# Patient Record
Sex: FEMALE | Race: WHITE | Employment: OTHER | RURAL
[De-identification: names, ages, dates, MRNs, and addresses within clinical notes are randomized per-mention and may not be internally consistent; named-entity substitution may affect disease eponyms.]

---

## 2017-01-11 ENCOUNTER — OFFICE VISIT (OUTPATIENT)
Dept: FAMILY MEDICINE CLINIC | Age: 77
End: 2017-01-11

## 2017-01-11 VITALS
RESPIRATION RATE: 18 BRPM | HEART RATE: 76 BPM | DIASTOLIC BLOOD PRESSURE: 70 MMHG | WEIGHT: 183.2 LBS | OXYGEN SATURATION: 98 % | BODY MASS INDEX: 35.97 KG/M2 | SYSTOLIC BLOOD PRESSURE: 132 MMHG | TEMPERATURE: 97.2 F | HEIGHT: 60 IN

## 2017-01-11 DIAGNOSIS — I10 ESSENTIAL HYPERTENSION: ICD-10-CM

## 2017-01-11 DIAGNOSIS — E78.00 PURE HYPERCHOLESTEROLEMIA: ICD-10-CM

## 2017-01-11 DIAGNOSIS — E11.21 TYPE 2 DIABETES MELLITUS WITH DIABETIC NEPHROPATHY, WITHOUT LONG-TERM CURRENT USE OF INSULIN (HCC): Primary | ICD-10-CM

## 2017-01-11 NOTE — MR AVS SNAPSHOT
Visit Information Date & Time Provider Department Dept. Phone Encounter #  
 1/11/2017  9:30 AM Halle Barrow MD [address] [place] [address] [id number] Follow-up Instructions Return in about 3 months (around 4/11/2017). Follow-up and Disposition History Upcoming Health Maintenance Date Due OSTEOPOROSIS SCREENING (DEXA) 2/16/2005 DTaP/Tdap/Td series (1 - Tdap) 4/12/2009 Pneumococcal 65+ Low/Medium Risk (2 of 2 - PPSV23) 10/8/2016 MEDICARE YEARLY EXAM 1/11/2017 HEMOGLOBIN A1C Q6M 4/12/2017 EYE EXAM RETINAL OR DILATED Q1 6/21/2017 MICROALBUMIN Q1 7/11/2017 FOOT EXAM Q1 10/12/2017 LIPID PANEL Q1 10/12/2017 GLAUCOMA SCREENING Q2Y 6/21/2018 Allergies as of 1/11/2017  Review Complete On: 1/11/2017 By: Halle Barrow MD  
  
 Severity Noted Reaction Type Reactions Ace Inhibitors  10/18/2013    Unable to Obtain Antihist [Diphenhydramine Hcl]  10/18/2013    Hives Zithromax [Azithromycin]  10/18/2013    Itching Current Immunizations  Reviewed on 10/12/2016 Name Date Influenza High Dose Vaccine PF 10/12/2016 Influenza Vaccine 10/8/2015, 10/18/2013 Pneumococcal Conjugate (PCV-13) 10/8/2015 Pneumococcal Polysaccharide (PPSV-23) 1/12/2001 Td 4/11/2009 Zoster Vaccine, Live 7/21/2014 Not reviewed this visit You Were Diagnosed With   
  
 Codes Comments Type 2 diabetes mellitus with diabetic nephropathy, without long-term current use of insulin (HCC)    -  Primary ICD-10-CM: E11.21 
ICD-9-CM: 250.40, 583.81 Pure hypercholesterolemia     ICD-10-CM: E78.00 ICD-9-CM: 272.0 Essential hypertension     ICD-10-CM: I10 
ICD-9-CM: 401.9 Vitals BP Pulse Temp Resp Height(growth percentile) 132/70 (BP 1 Location: Left arm, BP Patient Position: Sitting) 76 97.2 °F (36.2 °C) (Temporal) 18 5' (1.524 m) Weight(growth percentile) SpO2 BMI OB Status Smoking Status 183 lb 3.2 oz (83.1 kg) 98% 35.78 kg/m2 Menopause Never Smoker BMI and BSA Data Body Mass Index Body Surface Area 35.78 kg/m 2 1.88 m 2 Preferred Pharmacy Pharmacy Name [address] [phone] Your Updated Medication List  
  
   
This list is accurate as of: 1/11/17 10:38 AM.  Always use your most recent med list.  
  
  
  
  
 atorvastatin 20 mg tablet Commonly known as:  LIPITOR Take 1 Tab by mouth daily. Indications: prevent cardiac disease  
  
 clotrimazole 1 % topical cream  
Commonly known as:  Nguyen Falk Apply  to affected area two (2) times a day. exenatide microspheres 2 mg/0.65 mL Pnij Commonly known as:  BYDUREON  
2 mg by SubCUTAneous route every seven (7) days. FISH OIL 1,000 mg Cap Generic drug:  omega-3 fatty acids-vitamin e Take 1 Cap by mouth.  
  
 losartan 100 mg tablet Commonly known as:  COZAAR Take 1 Tab by mouth daily. metFORMIN  mg tablet Commonly known as:  GLUCOPHAGE XR Take 4 Tabs by mouth daily (with dinner). We Performed the Following COLLECTION VENOUS BLOOD,VENIPUNCTURE A6763907 CPT(R)] HEMOGLOBIN A1C WITH EAG [68084 CPT(R)] METABOLIC PANEL, BASIC [15026 CPT(R)] MICROALBUMIN, UR, RAND W/ MICROALBUMIN/CREA RATIO [id number] CPT(R)] Follow-up Instructions Return in about 3 months (around 4/11/2017). Introducing [facility]! Dear Camilla Heard: Thank you for requesting a [email or web address] account. Our records indicate that you already have an active [email or web address] account. You can access your account anytime at [email or web address] Did you know that you can access your hospital and ER discharge instructions at any time in [email or web address]? You can also review all of your test results from your hospital stay or ER visit. Additional Information If you have questions, please visit the Frequently Asked Questions section of the [employer] website at [email or web address] Remember, [employer] is NOT to be used for urgent needs. For medical emergencies, dial 911. Now available from your iPhone and Android! Please provide this summary of care documentation to your next provider. Your primary care clinician is listed as Carol Ann Esteban. If you have any questions after today's visit, please call [phone].

## 2017-01-11 NOTE — PROGRESS NOTES
Sara Silva is a 68 y.o. female presenting for/with:    Check Up    HPI:  Diabetes. Sugars controlled fairly on last check on metformin and bydureon 1 inj/wk. Improved a little, but weight really not dropping. Hypoglycemia: none  Tolerating current treatment well  Current medications include metformin ER 2000mg/d and bydureon weekly. Lab Results   Component Value Date/Time    Hemoglobin A1c 9.6 10/12/2016 10:59 AM    Hemoglobin A1c 10.0 07/11/2016 09:28 AM    Hemoglobin A1c 9.7 01/11/2016 10:44 AM    Glucose 226 07/11/2016 09:28 AM    Microalb/Creat ratio (ug/mg creat.) 64.7 07/11/2016 09:27 AM    LDL, calculated 49 10/12/2016 10:59 AM    Creatinine 0.69 07/11/2016 09:28 AM     Lab Results   Component Value Date/Time    Microalb/Creat ratio (ug/mg creat.) 64.7 07/11/2016 09:27 AM     Last eye exam performed  6/16 with DR. Denise Amaya in [facility], no DR. Last foot exam performed 10/16, no trophic changes or ulcerative lesions and normal monofilament exam    Hypertension. Blood pressures have been improving. Management at last visit included con't current tx. Current regimen: angiotensin II receptor antagonist. Symptoms include no symptoms. Patient denies chest pain, palpitations, peripheral edema. Lab review:   Lab Results   Component Value Date/Time    Sodium 138 07/11/2016 09:28 AM    Potassium 4.5 07/11/2016 09:28 AM    Chloride 100 07/11/2016 09:28 AM    CO2 26 07/11/2016 09:28 AM    Anion gap 13 09/27/2010 05:30 AM    Glucose 226 07/11/2016 09:28 AM    BUN 13 07/11/2016 09:28 AM    Creatinine 0.69 07/11/2016 09:28 AM    BUN/Creatinine ratio 19 07/11/2016 09:28 AM    GFR est AA 98 07/11/2016 09:28 AM    GFR est non-AA 85 07/11/2016 09:28 AM    Calcium 9.4 07/11/2016 09:28 AM     Hyperlipidemia. On lipitor 20. Sommer well. No myalgias, arthralgias, unusual weakness.   Lab Results   Component Value Date/Time    Cholesterol, total 145 10/12/2016 10:59 AM    HDL Cholesterol 56 10/12/2016 10:59 AM    LDL, calculated 49 10/12/2016 10:59 AM    VLDL, calculated 40 10/12/2016 10:59 AM    Triglyceride 202 10/12/2016 10:59 AM     Lab Results   Component Value Date/Time    ALT 23 10/12/2015 09:53 AM    AST 9 10/12/2015 09:53 AM    Alk. phosphatase 96 10/12/2015 09:53 AM    Bilirubin, total 0.3 10/12/2015 09:53 AM     Visit Vitals    /70 (BP 1 Location: Left arm, BP Patient Position: Sitting)    Pulse 76    Temp 97.2 °F (36.2 °C) (Temporal)    Resp 18    Ht 5' (1.524 m)    Wt 183 lb 3.2 oz (83.1 kg)    SpO2 98%    BMI 35.78 kg/m2     Wt Readings from Last 3 Encounters:   01/11/17 183 lb 3.2 oz (83.1 kg)   10/12/16 183 lb (83 kg)   08/29/16 184 lb (83.5 kg)     BP Readings from Last 3 Encounters:   01/11/17 132/70   10/12/16 148/71   08/29/16 130/58     Physical Examination: General appearance - alert, well appearing, and in no distress  Mental status - alert, oriented to person, place, and time  Eyes - pupils equal and reactive, extraocular eye movements intact. ENT - bilateral external ears and nose normal. Normal lips. Bilat ears with mod cerumen, L >R. Neck - supple, no significant adenopathy, no thyromegaly or mass  Extremities - peripheral pulses normal, no pedal edema, no clubbing or cyanosis. A/P:  DM2  Doing well on bydureon 1 inj/week and metformin, yulissa well, but weight not dropping. Check a1c, monster/cr. If A1c not improving, plan start amaryl 1mg every day. HTN  Ok today. Con't current tx. HLD  well controlled. con't current tx.     F/U 3mo/PRN

## 2017-01-12 LAB
ALBUMIN/CREAT UR: 24.7 MG/G CREAT (ref 0–30)
BUN SERPL-MCNC: 16 MG/DL (ref 8–27)
BUN/CREAT SERPL: 28 (ref 11–26)
CALCIUM SERPL-MCNC: 9.5 MG/DL (ref 8.7–10.3)
CHLORIDE SERPL-SCNC: 101 MMOL/L (ref 96–106)
CO2 SERPL-SCNC: 22 MMOL/L (ref 18–29)
CREAT SERPL-MCNC: 0.58 MG/DL (ref 0.57–1)
CREAT UR-MCNC: 88.5 MG/DL
EST. AVERAGE GLUCOSE BLD GHB EST-MCNC: 183 MG/DL
GLUCOSE SERPL-MCNC: 149 MG/DL (ref 65–99)
HBA1C MFR BLD: 8 % (ref 4.8–5.6)
MICROALBUMIN UR-MCNC: 21.9 UG/ML
POTASSIUM SERPL-SCNC: 4.6 MMOL/L (ref 3.5–5.2)
SODIUM SERPL-SCNC: 142 MMOL/L (ref 134–144)

## 2017-02-14 ENCOUNTER — HOSPITAL ENCOUNTER (OUTPATIENT)
Dept: PREADMISSION TESTING | Age: 77
Discharge: HOME | End: 2017-02-14
Attending: PLASTIC SURGERY
Payer: MEDICARE

## 2017-02-14 VITALS
DIASTOLIC BLOOD PRESSURE: 70 MMHG | OXYGEN SATURATION: 95 % | HEIGHT: 61 IN | TEMPERATURE: 97.6 F | HEART RATE: 82 BPM | WEIGHT: 184.3 LBS | SYSTOLIC BLOOD PRESSURE: 131 MMHG | RESPIRATION RATE: 20 BRPM | BODY MASS INDEX: 34.8 KG/M2

## 2017-02-14 LAB
ANION GAP BLD CALC-SCNC: 10 MMOL/L (ref 5–15)
ATRIAL RATE: 77 BPM
BUN SERPL-MCNC: 17 MG/DL (ref 6–20)
BUN/CREAT SERPL: 22 (ref 12–20)
CALCIUM SERPL-MCNC: 9.2 MG/DL (ref 8.5–10.1)
CALCULATED P AXIS, ECG09: 64 DEGREES
CALCULATED R AXIS, ECG10: 37 DEGREES
CALCULATED T AXIS, ECG11: 31 DEGREES
CHLORIDE SERPL-SCNC: 107 MMOL/L (ref 97–108)
CO2 SERPL-SCNC: 26 MMOL/L (ref 21–32)
CREAT SERPL-MCNC: 0.77 MG/DL (ref 0.55–1.02)
DIAGNOSIS, 93000: NORMAL
ERYTHROCYTE [DISTWIDTH] IN BLOOD BY AUTOMATED COUNT: 14 % (ref 11.5–14.5)
GLUCOSE SERPL-MCNC: 200 MG/DL (ref 65–100)
HCT VFR BLD AUTO: 34 % (ref 35–47)
HGB BLD-MCNC: 11 G/DL (ref 11.5–16)
MCH RBC QN AUTO: 28.9 PG (ref 26–34)
MCHC RBC AUTO-ENTMCNC: 32.4 G/DL (ref 30–36.5)
MCV RBC AUTO: 89.2 FL (ref 80–99)
P-R INTERVAL, ECG05: 148 MS
PLATELET # BLD AUTO: 193 K/UL (ref 150–400)
POTASSIUM SERPL-SCNC: 4.2 MMOL/L (ref 3.5–5.1)
Q-T INTERVAL, ECG07: 368 MS
QRS DURATION, ECG06: 86 MS
QTC CALCULATION (BEZET), ECG08: 416 MS
RBC # BLD AUTO: 3.81 M/UL (ref 3.8–5.2)
SODIUM SERPL-SCNC: 143 MMOL/L (ref 136–145)
VENTRICULAR RATE, ECG03: 77 BPM
WBC # BLD AUTO: 6.2 K/UL (ref 3.6–11)

## 2017-02-14 PROCEDURE — 80048 BASIC METABOLIC PNL TOTAL CA: CPT | Performed by: PLASTIC SURGERY

## 2017-02-14 PROCEDURE — 36415 COLL VENOUS BLD VENIPUNCTURE: CPT | Performed by: PLASTIC SURGERY

## 2017-02-14 PROCEDURE — 85027 COMPLETE CBC AUTOMATED: CPT | Performed by: PLASTIC SURGERY

## 2017-02-14 PROCEDURE — 93005 ELECTROCARDIOGRAM TRACING: CPT

## 2017-02-14 RX ORDER — METFORMIN HYDROCHLORIDE EXTENDED-RELEASE TABLETS 1000 MG/1
1000 TABLET, FILM COATED, EXTENDED RELEASE ORAL 2 TIMES DAILY
COMMUNITY
End: 2017-03-13 | Stop reason: SDUPTHER

## 2017-02-14 RX ORDER — SODIUM CHLORIDE, SODIUM LACTATE, POTASSIUM CHLORIDE, CALCIUM CHLORIDE 600; 310; 30; 20 MG/100ML; MG/100ML; MG/100ML; MG/100ML
25 INJECTION, SOLUTION INTRAVENOUS CONTINUOUS
Status: CANCELLED | OUTPATIENT
Start: 2017-02-22

## 2017-02-14 NOTE — PERIOP NOTES
Spoke to Dr. 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 Order received.

## 2017-02-14 NOTE — PERIOP NOTES
[facility]  Preoperative Instructions        Surgery Date 02/22/16          Time of Arrival 1030  Contact # [phone] home    1. On the day of your surgery, please report to the Surgical Services Registration Desk and sign in at your designated time. The Surgery Center is located to the right of the Emergency Room. 2. You must have someone with you to drive you home. You should not drive a car for 24 hours following surgery. Please make arrangements for a friend or family member to stay with you for the first 24 hours after your surgery. 3. Do not have anything to eat or drink (including water, gum, mints, coffee, juice) after midnight       . This may not apply to medications prescribed by your physician. Please note special instructions, if applicable. If you are currently taking Plavix, Coumadin, or other blood-thinning agents, contact your surgeon for instructions. 4. We recommend you do not drink any alcoholic beverages for 24 hours before and after your surgery. 5. Have a list of all current medications, including vitamins, herbal supplements and any other over the counter medications. Stop all Aspirin and non-steroidal anti-inflammatory drugs (I.e. Advil, Aleve), as directed by your surgeon's office. Stop all vitamins and herbal supplements seven days prior to your surgery. 6. Wear comfortable clothes. Wear glasses instead of contacts. Do not bring any money or jewelry. Please bring picture ID, insurance card, and any prearranged co-payment or hospital payment. Do not wear make-up, particularly mascara the morning of your surgery. Do not wear nail polish, particularly if you are having foot /hand surgery. Wear your hair loose or down, no ponytails, buns, adonis pins or clips. All body piercings must be removed.   Please shower with antibacterial soap for three consecutive days before and on the morning of surgery, but do not apply any lotions, powders or deodorants after the shower on the day of surgery. Please use a fresh towels after each shower. Please sleep in clean clothes and change bed linens the night before surgery. Please do not shave for 48 hours prior to surgery. Shaving of the face is acceptable. 7. You should understand that if you do not follow these instructions your surgery may be cancelled. If your physical condition changes (I.e. fever, cold or flu) please contact your surgeon as soon as possible. 8. It is important that you be on time. If a situation occurs where you may be late, please call [phone] (OR Holding Area). 9. If you have any questions and or problems, please call [phone] (Pre-admission Testing). 10. Your surgery time may be subject to change. You will receive a phone call the evening prior if your time changes. 11.  If having outpatient surgery, you must have someone to drive you here, stay with you during the duration of your stay, and to drive you home at time of discharge. Special Instructions:    MEDICATIONS TO TAKE THE MORNING OF SURGERY WITH A SIP OF WATER:none      I understand a pre-operative phone call will be made to verify my surgery time. In the event that I am not available, I give permission for a message to be left on my answering service and/or with another person?   yes         ___________________      __________   _________    (Signature of Patient)             (Witness)                (Date and Time)

## 2017-02-15 LAB
BACTERIA SPEC CULT: NORMAL
BACTERIA SPEC CULT: NORMAL
SERVICE CMNT-IMP: NORMAL

## 2017-02-22 ENCOUNTER — ANESTHESIA EVENT (OUTPATIENT)
Dept: SURGERY | Age: 77
End: 2017-02-22
Payer: MEDICARE

## 2017-02-22 ENCOUNTER — HOSPITAL ENCOUNTER (OUTPATIENT)
Age: 77
Setting detail: OUTPATIENT SURGERY
Discharge: HOME | End: 2017-02-22
Attending: PLASTIC SURGERY | Admitting: PLASTIC SURGERY
Payer: MEDICARE

## 2017-02-22 ENCOUNTER — ANESTHESIA (OUTPATIENT)
Dept: SURGERY | Age: 77
End: 2017-02-22
Payer: MEDICARE

## 2017-02-22 VITALS
SYSTOLIC BLOOD PRESSURE: 148 MMHG | HEIGHT: 61 IN | BODY MASS INDEX: 34.8 KG/M2 | HEART RATE: 72 BPM | OXYGEN SATURATION: 97 % | RESPIRATION RATE: 16 BRPM | DIASTOLIC BLOOD PRESSURE: 55 MMHG | TEMPERATURE: 98.2 F | WEIGHT: 184.3 LBS

## 2017-02-22 LAB
GLUCOSE BLD STRIP.AUTO-MCNC: 127 MG/DL (ref 65–100)
GLUCOSE BLD STRIP.AUTO-MCNC: 128 MG/DL (ref 65–100)
SERVICE CMNT-IMP: ABNORMAL
SERVICE CMNT-IMP: ABNORMAL

## 2017-02-22 PROCEDURE — 74011000250 HC RX REV CODE- 250: Performed by: PLASTIC SURGERY

## 2017-02-22 PROCEDURE — 77030002996 HC SUT SLK J&J -A: Performed by: PLASTIC SURGERY

## 2017-02-22 PROCEDURE — 77030032490 HC SLV COMPR SCD KNE COVD -B: Performed by: PLASTIC SURGERY

## 2017-02-22 PROCEDURE — 76210000063 HC OR PH I REC FIRST 0.5 HR: Performed by: PLASTIC SURGERY

## 2017-02-22 PROCEDURE — 76010000138 HC OR TIME 0.5 TO 1 HR: Performed by: PLASTIC SURGERY

## 2017-02-22 PROCEDURE — 77030018846 HC SOL IRR STRL H20 ICUM -A: Performed by: PLASTIC SURGERY

## 2017-02-22 PROCEDURE — 74011250636 HC RX REV CODE- 250/636: Performed by: ANESTHESIOLOGY

## 2017-02-22 PROCEDURE — 74011250636 HC RX REV CODE- 250/636

## 2017-02-22 PROCEDURE — 82962 GLUCOSE BLOOD TEST: CPT

## 2017-02-22 PROCEDURE — 76060000032 HC ANESTHESIA 0.5 TO 1 HR: Performed by: PLASTIC SURGERY

## 2017-02-22 PROCEDURE — 74011250636 HC RX REV CODE- 250/636: Performed by: PLASTIC SURGERY

## 2017-02-22 PROCEDURE — 77030002888 HC SUT CHRMC J&J -A: Performed by: PLASTIC SURGERY

## 2017-02-22 PROCEDURE — 88305 TISSUE EXAM BY PATHOLOGIST: CPT | Performed by: PLASTIC SURGERY

## 2017-02-22 PROCEDURE — 76210000020 HC REC RM PH II FIRST 0.5 HR: Performed by: PLASTIC SURGERY

## 2017-02-22 PROCEDURE — 77030018836 HC SOL IRR NACL ICUM -A: Performed by: PLASTIC SURGERY

## 2017-02-22 PROCEDURE — 74011000250 HC RX REV CODE- 250

## 2017-02-22 RX ORDER — SODIUM CHLORIDE, SODIUM LACTATE, POTASSIUM CHLORIDE, CALCIUM CHLORIDE 600; 310; 30; 20 MG/100ML; MG/100ML; MG/100ML; MG/100ML
25 INJECTION, SOLUTION INTRAVENOUS CONTINUOUS
Status: DISCONTINUED | OUTPATIENT
Start: 2017-02-22 | End: 2017-02-22 | Stop reason: HOSPADM

## 2017-02-22 RX ORDER — DIPHENHYDRAMINE HYDROCHLORIDE 50 MG/ML
12.5 INJECTION, SOLUTION INTRAMUSCULAR; INTRAVENOUS
Status: DISCONTINUED | OUTPATIENT
Start: 2017-02-22 | End: 2017-02-22 | Stop reason: HOSPADM

## 2017-02-22 RX ORDER — MIDAZOLAM HYDROCHLORIDE 1 MG/ML
INJECTION, SOLUTION INTRAMUSCULAR; INTRAVENOUS AS NEEDED
Status: DISCONTINUED | OUTPATIENT
Start: 2017-02-22 | End: 2017-02-22 | Stop reason: HOSPADM

## 2017-02-22 RX ORDER — PROPOFOL 10 MG/ML
INJECTION, EMULSION INTRAVENOUS
Status: DISCONTINUED | OUTPATIENT
Start: 2017-02-22 | End: 2017-02-22 | Stop reason: HOSPADM

## 2017-02-22 RX ORDER — LIDOCAINE HYDROCHLORIDE 10 MG/ML
0.1 INJECTION, SOLUTION EPIDURAL; INFILTRATION; INTRACAUDAL; PERINEURAL AS NEEDED
Status: DISCONTINUED | OUTPATIENT
Start: 2017-02-22 | End: 2017-02-22 | Stop reason: HOSPADM

## 2017-02-22 RX ORDER — MORPHINE SULFATE 10 MG/ML
2 INJECTION, SOLUTION INTRAMUSCULAR; INTRAVENOUS
Status: DISCONTINUED | OUTPATIENT
Start: 2017-02-22 | End: 2017-02-22 | Stop reason: HOSPADM

## 2017-02-22 RX ORDER — FENTANYL CITRATE 50 UG/ML
INJECTION, SOLUTION INTRAMUSCULAR; INTRAVENOUS AS NEEDED
Status: DISCONTINUED | OUTPATIENT
Start: 2017-02-22 | End: 2017-02-22 | Stop reason: HOSPADM

## 2017-02-22 RX ORDER — CEFAZOLIN SODIUM IN 0.9 % NACL 2 G/100 ML
2 PLASTIC BAG, INJECTION (ML) INTRAVENOUS EVERY 8 HOURS
Status: COMPLETED | OUTPATIENT
Start: 2017-02-22 | End: 2017-02-22

## 2017-02-22 RX ORDER — SODIUM CHLORIDE 0.9 % (FLUSH) 0.9 %
5-10 SYRINGE (ML) INJECTION AS NEEDED
Status: DISCONTINUED | OUTPATIENT
Start: 2017-02-22 | End: 2017-02-22 | Stop reason: HOSPADM

## 2017-02-22 RX ORDER — HYDROCODONE BITARTRATE AND ACETAMINOPHEN 5; 325 MG/1; MG/1
1-2 TABLET ORAL
Qty: 20 TAB | Refills: 0 | Status: SHIPPED | OUTPATIENT
Start: 2017-02-22 | End: 2017-11-01 | Stop reason: ALTCHOICE

## 2017-02-22 RX ORDER — SODIUM CHLORIDE 0.9 % (FLUSH) 0.9 %
5-10 SYRINGE (ML) INJECTION EVERY 8 HOURS
Status: DISCONTINUED | OUTPATIENT
Start: 2017-02-22 | End: 2017-02-22 | Stop reason: HOSPADM

## 2017-02-22 RX ORDER — HYDROMORPHONE HYDROCHLORIDE 1 MG/ML
.2-.5 INJECTION, SOLUTION INTRAMUSCULAR; INTRAVENOUS; SUBCUTANEOUS
Status: DISCONTINUED | OUTPATIENT
Start: 2017-02-22 | End: 2017-02-22 | Stop reason: HOSPADM

## 2017-02-22 RX ORDER — FENTANYL CITRATE 50 UG/ML
25 INJECTION, SOLUTION INTRAMUSCULAR; INTRAVENOUS
Status: DISCONTINUED | OUTPATIENT
Start: 2017-02-22 | End: 2017-02-22 | Stop reason: HOSPADM

## 2017-02-22 RX ORDER — LIDOCAINE HYDROCHLORIDE AND EPINEPHRINE 10; 10 MG/ML; UG/ML
INJECTION, SOLUTION INFILTRATION; PERINEURAL AS NEEDED
Status: DISCONTINUED | OUTPATIENT
Start: 2017-02-22 | End: 2017-02-22 | Stop reason: HOSPADM

## 2017-02-22 RX ORDER — ONDANSETRON 2 MG/ML
INJECTION INTRAMUSCULAR; INTRAVENOUS AS NEEDED
Status: DISCONTINUED | OUTPATIENT
Start: 2017-02-22 | End: 2017-02-22 | Stop reason: HOSPADM

## 2017-02-22 RX ORDER — PROPOFOL 10 MG/ML
INJECTION, EMULSION INTRAVENOUS AS NEEDED
Status: DISCONTINUED | OUTPATIENT
Start: 2017-02-22 | End: 2017-02-22 | Stop reason: HOSPADM

## 2017-02-22 RX ADMIN — ONDANSETRON 4 MG: 2 INJECTION INTRAMUSCULAR; INTRAVENOUS at 12:45

## 2017-02-22 RX ADMIN — SODIUM CHLORIDE, SODIUM LACTATE, POTASSIUM CHLORIDE, AND CALCIUM CHLORIDE 25 ML/HR: 600; 310; 30; 20 INJECTION, SOLUTION INTRAVENOUS at 11:39

## 2017-02-22 RX ADMIN — FENTANYL CITRATE 50 MCG: 50 INJECTION, SOLUTION INTRAMUSCULAR; INTRAVENOUS at 12:36

## 2017-02-22 RX ADMIN — SODIUM CHLORIDE, SODIUM LACTATE, POTASSIUM CHLORIDE, AND CALCIUM CHLORIDE: 600; 310; 30; 20 INJECTION, SOLUTION INTRAVENOUS at 12:10

## 2017-02-22 RX ADMIN — FENTANYL CITRATE 50 MCG: 50 INJECTION, SOLUTION INTRAMUSCULAR; INTRAVENOUS at 12:30

## 2017-02-22 RX ADMIN — PROPOFOL 20 MG: 10 INJECTION, EMULSION INTRAVENOUS at 12:38

## 2017-02-22 RX ADMIN — PROPOFOL 10 MG: 10 INJECTION, EMULSION INTRAVENOUS at 12:42

## 2017-02-22 RX ADMIN — MIDAZOLAM HYDROCHLORIDE 2 MG: 1 INJECTION, SOLUTION INTRAMUSCULAR; INTRAVENOUS at 12:30

## 2017-02-22 RX ADMIN — PROPOFOL 10 MG: 10 INJECTION, EMULSION INTRAVENOUS at 12:46

## 2017-02-22 RX ADMIN — PROPOFOL 25 MCG/KG/MIN: 10 INJECTION, EMULSION INTRAVENOUS at 12:45

## 2017-02-22 RX ADMIN — CEFAZOLIN 2 G: 10 INJECTION, POWDER, FOR SOLUTION INTRAVENOUS; PARENTERAL at 12:43

## 2017-02-22 NOTE — IP AVS SNAPSHOT
[address] [facility] 
[phone] Patient: Manisha Paredes MRN: [id number] You are allergic to the following Allergen Reactions Ace Inhibitors Unable to Obtain Antihist (Diphenhydramine Hcl) Hives Zithromax (Azithromycin) Itching Recent Documentation Height  
  
  
  
  
  
 1.549 m Emergency Contacts Name Discharge Info Relation Home Work Mobile Perico Angelo CAREGIVER [3] Spouse [3]   [phone] About your hospitalization You were admitted on:  February 22, 2017 You last received care in the:  [facility] PACU You were discharged on:  February 22, 2017 Unit phone number:  [phone] Why you were hospitalized Your primary diagnosis was:  Not on File Providers Seen During Your Hospitalizations Provider Role Specialty Primary office phone Marleny Dumont MD Attending Provider Plastic Surgery [phone] Your Primary Care Physician (PCP) Primary Care Physician Office Phone Office Fax [place], [address] [phone] Follow-up Information Follow up With Details Comments Contact Info Mikayla Pal MD   [address] [facility] [facility] [phone] Current Discharge Medication List  
  
START taking these medications Dose & Instructions Dispensing Information Comments Morning Noon Evening Bedtime HYDROcodone-acetaminophen 5-325 mg per tablet Commonly known as:  Birgit Sharp Your next dose is: Today, Tomorrow Other:  _________ Dose:  1-2 Tab Take 1-2 Tabs by mouth every four (4) hours as needed for Pain. Max Daily Amount: 12 Tabs. Quantity:  20 Tab Refills:  0 CONTINUE these medications which have NOT CHANGED Dose & Instructions Dispensing Information Comments Morning Noon Evening Bedtime AMOXICILLIN PO Your next dose is: Today, Tomorrow Other:  _________ Take  by mouth. Takes 4 tabs prior to dental work Refills:  0  
     
   
   
   
  
 atorvastatin 20 mg tablet Commonly known as:  LIPITOR Your next dose is: Today, Tomorrow Other:  _________ Dose:  20 mg Take 1 Tab by mouth daily. Indications: prevent cardiac disease Quantity:  90 Tab Refills:  3  
     
   
   
   
  
 clotrimazole 1 % topical cream  
Commonly known as:  Laurel Lash Your next dose is: Today, Tomorrow Other:  _________ Apply  to affected area two (2) times a day. Quantity:  45 g Refills:  3  
     
   
   
   
  
 exenatide microspheres 2 mg/0.65 mL Pnij Commonly known as:  BYDUREON Your next dose is: Today, Tomorrow Other:  _________ Dose:  2 mg  
2 mg by SubCUTAneous route every seven (7) days. Quantity:  7.8 mL Refills:  3 FISH OIL 1,000 mg Cap Generic drug:  omega-3 fatty acids-vitamin e Your next dose is: Today, Tomorrow Other:  _________ Dose:  1 Cap Take 1 Cap by mouth. Refills:  0  
     
   
   
   
  
 losartan 100 mg tablet Commonly known as:  COZAAR Your next dose is: Today, Tomorrow Other:  _________ Dose:  100 mg Take 1 Tab by mouth daily. Quantity:  90 Tab Refills:  3  
     
   
   
   
  
 metFORMIN ER 1,000 mg Tr24 Your next dose is: Today, Tomorrow Other:  _________ Dose:  1000 mg Take 1,000 mg by mouth two (2) times a day. Refills:  0 Where to Get Your Medications Information on where to get these meds will be given to you by the nurse or doctor. ! Ask your nurse or doctor about these medications HYDROcodone-acetaminophen 5-325 mg per tablet Discharge Instructions May shower Friday and wash hair. Keep incisions clean. FOLLOW UP VISIT Appointment in: Two Weeks Call [phone] to make appt DISCHARGE SUMMARY from Nurse The following personal items are in your possession at time of discharge: 
 
Dental Appliances: None Visual Aid: Glasses (in with belongings to pacu) Home Medications: None Jewelry: None Clothing: Other (comment) (street clothes taken to pacu with glasses) Other Valuables: None Personal Items Sent to Safe: declines PATIENT INSTRUCTIONS: 
 
After general anesthesia or intravenous sedation, for 24 hours or while taking prescription Narcotics: · Limit your activities · Do not drive and operate hazardous machinery · Do not make important personal or business decisions · Do  not drink alcoholic beverages · If you have not urinated within 8 hours after discharge, please contact your surgeon on call. Report the following to your surgeon: 
· Excessive pain, swelling, redness or odor of or around the surgical area · Temperature over 100.5 · Nausea and vomiting lasting longer than 4 hours or if unable to take medications · Any signs of decreased circulation or nerve impairment to extremity: change in color, persistent  numbness, tingling, coldness or increase pain · Any questions What to do at Home: *  Please give a list of your current medications to your Primary Care Provider. *  Please update this list whenever your medications are discontinued, doses are 
    changed, or new medications (including over-the-counter products) are added. *  Please carry medication information at all times in case of emergency situations. These are general instructions for a healthy lifestyle: No smoking/ No tobacco products/ Avoid exposure to second hand smoke Surgeon General's Warning:  Quitting smoking now greatly reduces serious risk to your health. Obesity, smoking, and sedentary lifestyle greatly increases your risk for illness A healthy diet, regular physical exercise & weight monitoring are important for maintaining a healthy lifestyle You may be retaining fluid if you have a history of heart failure or if you experience any of the following symptoms:  Weight gain of 3 pounds or more overnight or 5 pounds in a week, increased swelling in our hands or feet or shortness of breath while lying flat in bed. Please call your doctor as soon as you notice any of these symptoms; do not wait until your next office visit. Recognize signs and symptoms of STROKE: 
 
F-face looks uneven A-arms unable to move or move unevenly S-speech slurred or non-existent T-time-call 911 as soon as signs and symptoms begin-DO NOT go Back to bed or wait to see if you get better-TIME IS BRAIN. Warning Signs of HEART ATTACK Call 911 if you have these symptoms: 
? Chest discomfort. Most heart attacks involve discomfort in the center of the chest that lasts more than a few minutes, or that goes away and comes back. It can feel like uncomfortable pressure, squeezing, fullness, or pain. ? Discomfort in other areas of the upper body. Symptoms can include pain or discomfort in one or both arms, the back, neck, jaw, or stomach. ? Shortness of breath with or without chest discomfort. ? Other signs may include breaking out in a cold sweat, nausea, or lightheadedness. Don't wait more than five minutes to call [address]! Fast action can save your life. Calling 911 is almost always the fastest way to get lifesaving treatment. Emergency Medical Services staff can begin treatment when they arrive  up to an hour sooner than if someone gets to the hospital by car. The discharge information has been reviewed with the patient and caregiver. The patient and caregiver verbalized understanding. Discharge medications reviewed with the patient and caregiver and appropriate educational materials and side effects teaching were provided. Hydrocodone/Acetaminophen (By mouth) Acetaminophen (l-meir-p-MIN-oh-fen), Hydrocodone Bitartrate (tvn-ocmp-QAQ-done bye-TAR-trate) Treats pain. This medicine contains a narcotic pain reliever. Brand Name(s):Hycet, Lorcet, Lorcet HD, Lorcet Plus, Lortab 10/325, Lortab 5/325, Lortab 7.5/325, Lortab Elixir, Norco, Verdrocet, Vicodin, Vicodin ES, Vicodin HP, Gris Brink 5/300 There may be other brand names for this medicine. When This Medicine Should Not Be Used: This medicine is not right for everyone. Do not use it if you had an allergic reaction to acetaminophen, hydrocodone, or other narcotic medicines. How to Use This Medicine:  
Tablet, Liquid, Capsule · Your doctor will tell you how much medicine to use. Do not use more than directed. · Measure the oral liquid medicine with a marked measuring spoon, oral syringe, or medicine cup. · Drink plenty of liquids to help avoid constipation. · Missed dose: Take a dose as soon as you remember. If it is almost time for your next dose, wait until then and take a regular dose. Do not take extra medicine to make up for a missed dose. · Store the medicine in a closed container at room temperature, away from heat, moisture, and direct light. Drugs and Foods to Avoid: Ask your doctor or pharmacist before using any other medicine, including over-the-counter medicines, vitamins, and herbal products. · Some medicines can affect how hydrocodone/acetaminophen works. Tell your doctor if you are using any of the following: ¨ An MAO inhibitor ¨ Medicine to treat depression · This medicine can intensify the effects of alcohol, sedatives, or tranquilizers.  Tell your doctor if you drink alcohol or if you are using any medicine that makes you sleepy, such as allergy medicine or another narcotic pain medicine. Acetaminophen can damage your liver, and your risk is higher if you also drink alcohol. Warnings While Using This Medicine: · Tell your doctor if you are pregnant or breastfeeding, or if you have lung disease, liver disease, kidney disease, problems urinating, an underactive thyroid, Irons disease, prostate problems, stomach problems, or a history of head injury or brain tumor. · This medicine may cause the following problems:  
¨ Liver problems ¨ Serious skin reactions · This medicine can be habit-forming. Do not use more than your prescribed dose. Call your doctor if you think your medicine is not working. · This medicine may make you dizzy or drowsy. Do not drive or doing anything else that could be dangerous until you know how this medicine affects you. · Too much of this medicine can cause death. Symptoms of an overdose include extreme dizziness or weakness, trouble breathing, slow heartbeat, seizure, and cold, clammy skin. · This medicine contains acetaminophen. Read the labels of all other medicines you are using to see if they also contain acetaminophen, or ask your doctor or pharmacist. Chace Hedge not use more than 4 grams (4,000 milligrams) total of acetaminophen in one day. · Tell any doctor or dentist who treats you that you are using this medicine. This medicine may affect certain medical test results. · This medicine may cause constipation, especially with long-term use. Ask your doctor if you should use a laxative to prevent and treat constipation. · Keep all medicine out of the reach of children. Never share your medicine with anyone. Possible Side Effects While Using This Medicine:  
Call your doctor right away if you notice any of these side effects: · Allergic reaction: Itching or hives, swelling in your face or hands, swelling or tingling in your mouth or throat, chest tightness, trouble breathing · Blistering, peeling, red skin rash · Change in how much or how often you urinate · Dark urine or pale stools, loss of appetite, stomach pain, yellow skin or eyes · Extreme weakness, shallow breathing, slow heartbeat, sweating, cold or clammy skin · Lightheadedness, dizziness, fainting · Unusual bleeding or bruising If you notice these less serious side effects, talk with your doctor: · Constipation, nausea, vomiting · Tiredness or sleepiness If you notice other side effects that you think are caused by this medicine, tell your doctor. Call your doctor for medical advice about side effects. You may report side effects to FDA at 4-624-MTI-6963 © 2016 [address] is for End User's use only and may not be sold, redistributed or otherwise used for commercial purposes. The above information is an  only. It is not intended as medical advice for individual conditions or treatments. Talk to your doctor, nurse or pharmacist before following any medical regimen to see if it is safe and effective for you. Discharge Orders None Introducing [facility]! Dear Bayron Herrera: Thank you for requesting a [employer] account. Our records indicate that you already have an active [employer] account. You can access your account anytime at [email or web address]. [employer] Did you know that you can access your hospital and ER discharge instructions at any time in [employer]? You can also review all of your test results from your hospital stay or ER visit. Additional Information If you have questions, please visit the Frequently Asked Questions section of the [employer] website at [email or web address]. [employer]/. Remember, [employer] is NOT to be used for urgent needs. For medical emergencies, dial 911. Now available from your iPhone and Android! General Information Please provide this summary of care documentation to your next provider. Patient Signature:  ____________________________________________________________ Date:  ____________________________________________________________  
  
Aldair Moulding Provider Signature:  ____________________________________________________________ Date:  ____________________________________________________________

## 2017-02-22 NOTE — OP NOTES
[address], [address]   OP NOTE       Name:  Luz Castanon   MR#:  [id number]   :  1940   Account #:  [de-identified]    Surgery Date:  2017   Date of Adm:  2017       PREOPERATIVE DIAGNOSIS: Recurrent sebaceous cyst of scalp x2. POSTOPERATIVE DIAGNOSIS: Recurrent sebaceous cyst of scalp   x2. PROCEDURES PERFORMED:     1. Excision anterior scalp cyst measuring 2 x 2 x 2.2 cm.   2. Excision posterior scalp cyst measuring 1.2 x 1.2 cm. 3. Total complex closure of scalp wounds measuring 4 cm. SURGEON: Yony Begum MD    ESTIMATED BLOOD LOSS: Less than 5 mL. SPECIMENS REMOVED: 2 scalp cysts    ANESTHESIA:  Local with IV sedation. COMPLICATIONS: None. INDICATIONS: This 49-year-old white female presented with painful,   recurrent inclusion cysts of the scalp that required excision. DESCRIPTION OF PROCEDURE: After informed consent was   obtained and under IV sedation, the scalp was infiltrated with 1%   lidocaine with epinephrine and then prepped and draped. The anterior   cyst was sharply excised by incising an ellipse directly overlying the   cyst. Sharp dissection freed it from adherent tissue and it was sent for   permanent pathologic evaluation. The posterior cyst was then excised   in the same manner. The wounds were irrigated. Significant   undermining was then performed circumferentially and the wounds   were closed in layers with 4-0 Vicryl and 4-0 Prolene. Her hair was   washed. She tolerated the procedure well and was transferred to the   recovery room in good condition.         Esther Wiggins MD        / Edwin.[email or web address]   D:  2017   15:07   T:  2017   15:53   Job #:  [id number]

## 2017-02-22 NOTE — DISCHARGE INSTRUCTIONS
May shower Friday and wash hair. Keep incisions clean. FOLLOW UP VISIT Appointment in: Two Weeks Call [phone] to make appt      DISCHARGE SUMMARY from Nurse    The following personal items are in your possession at time of discharge:    Dental Appliances: None  Visual Aid: Glasses (in with belongings to pacu)     Home Medications: None  Jewelry: None  Clothing: Other (comment) (street clothes taken to pacu with glasses)  Other Valuables: None  Personal Items Sent to Safe: declines          PATIENT INSTRUCTIONS:    After general anesthesia or intravenous sedation, for 24 hours or while taking prescription Narcotics:  · Limit your activities  · Do not drive and operate hazardous machinery  · Do not make important personal or business decisions  · Do  not drink alcoholic beverages  · If you have not urinated within 8 hours after discharge, please contact your surgeon on call. Report the following to your surgeon:  · Excessive pain, swelling, redness or odor of or around the surgical area  · Temperature over 100.5  · Nausea and vomiting lasting longer than 4 hours or if unable to take medications  · Any signs of decreased circulation or nerve impairment to extremity: change in color, persistent  numbness, tingling, coldness or increase pain  · Any questions        What to do at Home:        *  Please give a list of your current medications to your Primary Care Provider. *  Please update this list whenever your medications are discontinued, doses are      changed, or new medications (including over-the-counter products) are added. *  Please carry medication information at all times in case of emergency situations. These are general instructions for a healthy lifestyle:    No smoking/ No tobacco products/ Avoid exposure to second hand smoke    Surgeon General's Warning:  Quitting smoking now greatly reduces serious risk to your health.     Obesity, smoking, and sedentary lifestyle greatly increases your risk for illness    A healthy diet, regular physical exercise & weight monitoring are important for maintaining a healthy lifestyle    You may be retaining fluid if you have a history of heart failure or if you experience any of the following symptoms:  Weight gain of 3 pounds or more overnight or 5 pounds in a week, increased swelling in our hands or feet or shortness of breath while lying flat in bed. Please call your doctor as soon as you notice any of these symptoms; do not wait until your next office visit. Recognize signs and symptoms of STROKE:    F-face looks uneven    A-arms unable to move or move unevenly    S-speech slurred or non-existent    T-time-call 911 as soon as signs and symptoms begin-DO NOT go       Back to bed or wait to see if you get better-TIME IS BRAIN. Warning Signs of HEART ATTACK     Call 911 if you have these symptoms:   Chest discomfort. Most heart attacks involve discomfort in the center of the chest that lasts more than a few minutes, or that goes away and comes back. It can feel like uncomfortable pressure, squeezing, fullness, or pain.  Discomfort in other areas of the upper body. Symptoms can include pain or discomfort in one or both arms, the back, neck, jaw, or stomach.  Shortness of breath with or without chest discomfort.  Other signs may include breaking out in a cold sweat, nausea, or lightheadedness. Don't wait more than five minutes to call 911 - MINUTES MATTER! Fast action can save your life. Calling 911 is almost always the fastest way to get lifesaving treatment. Emergency Medical Services staff can begin treatment when they arrive -- up to an hour sooner than if someone gets to the hospital by car. The discharge information has been reviewed with the patient and caregiver. The patient and caregiver verbalized understanding.     Discharge medications reviewed with the patient and caregiver and appropriate educational materials and side effects teaching were provided. Hydrocodone/Acetaminophen (By mouth)   Acetaminophen (n-kdmv-a-MIN-oh-fen), Hydrocodone Bitartrate (ktu-ispl-JMW-done bye-TAR-trate)  Treats pain. This medicine contains a narcotic pain reliever. Brand Name(s):Hycet, Lorcet, Lorcet HD, Lorcet Plus, Lortab 10/325, Lortab 5/325, Lortab 7.5/325, Lortab Elixir, Norco, Verdrocet, Vicodin, Vicodin ES, Vicodin HP, Xodol, Xodol 5/300   There may be other brand names for this medicine. When This Medicine Should Not Be Used: This medicine is not right for everyone. Do not use it if you had an allergic reaction to acetaminophen, hydrocodone, or other narcotic medicines. How to Use This Medicine:   Tablet, Liquid, Capsule  · Your doctor will tell you how much medicine to use. Do not use more than directed. · Measure the oral liquid medicine with a marked measuring spoon, oral syringe, or medicine cup. · Drink plenty of liquids to help avoid constipation. · Missed dose: Take a dose as soon as you remember. If it is almost time for your next dose, wait until then and take a regular dose. Do not take extra medicine to make up for a missed dose. · Store the medicine in a closed container at room temperature, away from heat, moisture, and direct light. Drugs and Foods to Avoid:   Ask your doctor or pharmacist before using any other medicine, including over-the-counter medicines, vitamins, and herbal products. · Some medicines can affect how hydrocodone/acetaminophen works. Tell your doctor if you are using any of the following:   ¨ An MAO inhibitor  ¨ Medicine to treat depression  · This medicine can intensify the effects of alcohol, sedatives, or tranquilizers. Tell your doctor if you drink alcohol or if you are using any medicine that makes you sleepy, such as allergy medicine or another narcotic pain medicine. Acetaminophen can damage your liver, and your risk is higher if you also drink alcohol.   Warnings While Using This Medicine: · Tell your doctor if you are pregnant or breastfeeding, or if you have lung disease, liver disease, kidney disease, problems urinating, an underactive thyroid, Loup disease, prostate problems, stomach problems, or a history of head injury or brain tumor. · This medicine may cause the following problems:   ¨ Liver problems  ¨ Serious skin reactions  · This medicine can be habit-forming. Do not use more than your prescribed dose. Call your doctor if you think your medicine is not working. · This medicine may make you dizzy or drowsy. Do not drive or doing anything else that could be dangerous until you know how this medicine affects you. · Too much of this medicine can cause death. Symptoms of an overdose include extreme dizziness or weakness, trouble breathing, slow heartbeat, seizure, and cold, clammy skin. · This medicine contains acetaminophen. Read the labels of all other medicines you are using to see if they also contain acetaminophen, or ask your doctor or pharmacist. Abhilash Diasi not use more than 4 grams (4,000 milligrams) total of acetaminophen in one day. · Tell any doctor or dentist who treats you that you are using this medicine. This medicine may affect certain medical test results. · This medicine may cause constipation, especially with long-term use. Ask your doctor if you should use a laxative to prevent and treat constipation. · Keep all medicine out of the reach of children. Never share your medicine with anyone.   Possible Side Effects While Using This Medicine:   Call your doctor right away if you notice any of these side effects:  · Allergic reaction: Itching or hives, swelling in your face or hands, swelling or tingling in your mouth or throat, chest tightness, trouble breathing  · Blistering, peeling, red skin rash  · Change in how much or how often you urinate  · Dark urine or pale stools, loss of appetite, stomach pain, yellow skin or eyes  · Extreme weakness, shallow breathing, slow heartbeat, sweating, cold or clammy skin  · Lightheadedness, dizziness, fainting  · Unusual bleeding or bruising  If you notice these less serious side effects, talk with your doctor:   · Constipation, nausea, vomiting  · Tiredness or sleepiness  If you notice other side effects that you think are caused by this medicine, tell your doctor. Call your doctor for medical advice about side effects. You may report side effects to FDA at 3-558-VVD-6180  © 2016 [address] is for End User's use only and may not be sold, redistributed or otherwise used for commercial purposes. The above information is an  only. It is not intended as medical advice for individual conditions or treatments. Talk to your doctor, nurse or pharmacist before following any medical regimen to see if it is safe and effective for you.

## 2017-02-22 NOTE — PERIOP NOTES
TRANSFER - OUT REPORT:    Verbal report given to Jackie Puente RN  on [facility]  being transferred to phase 2(unit) for routine post - op       Report consisted of patients Situation, Background, Assessment and   Recommendations(SBAR). Information from the following report(s) SBAR, Kardex, Procedure Summary, Intake/Output, MAR and Recent Results was reviewed with the receiving nurse. Opportunity for questions and clarification was provided.       Patient transported with:   Tech   Pt family in waiting area was updated of pt status  At 1335 and est time for phase 2 transfer

## 2017-02-22 NOTE — PERIOP NOTES
Handoff Report from Operating Room to PACU    Report received from Christiano Elizalde and Chandler Hernandez CRNA  regarding Manisha Reggie. Surgeon(s):  Marleny Dumont MD  And Procedure(s) (LRB):  EXCISION SCALP CYSTS (N/A)  confirmed   with allergies and dressings discussed. Anesthesia type, drugs, patient history, complications, estimated blood loss, vital signs, intake and output, and last pain medication were reviewed.

## 2017-02-22 NOTE — BRIEF OP NOTE
BRIEF OPERATIVE NOTE    Date of Procedure: 2/22/2017   Preoperative Diagnosis: SCALP CYSTS  Postoperative Diagnosis: SCALP CYSTS    Procedure(s):  EXCISION SCALP CYSTS (anterior 2.2 x 2.2cm, posterior 1.2 x 1.2cm) with complex closure 4cm total  Surgeon(s) and Role:     * Bradley Obregon MD - Primary            Surgical Staff:  Circ-1: Nani Ramos RN  Scrub Tech-1: Shira Sam  Event Time In   Incision Start 1250   Incision Close 1305     Anesthesia: MAC   Estimated Blood Loss: 5ml  Specimens:   ID Type Source Tests Collected by Time Destination   1 : Anterior Scalp Cyst Preservative Scalp  Bradley Obregon MD 2/22/2017 1257 Pathology   2 : Posterior Scalp Cyst Preservative Scalp  Bradley Obregon MD 2/22/2017 1300 Pathology      Findings: see note   Complications: none  Implants: * No implants in log *

## 2017-02-22 NOTE — ANESTHESIA PREPROCEDURE EVALUATION
Anesthetic History   No history of anesthetic complications            Review of Systems / Medical History  Patient summary reviewed, nursing notes reviewed and pertinent labs reviewed    Pulmonary        Sleep apnea: CPAP           Neuro/Psych   Within defined limits           Cardiovascular    Hypertension: well controlled          CAD and hyperlipidemia    Exercise tolerance: >4 METS  Comments: Denies CAD-asymptomatic.    GI/Hepatic/Renal  Within defined limits              Endo/Other    Diabetes: well controlled, type 2    Obesity, morbid obesity, arthritis, cancer and anemia     Other Findings            Physical Exam    Airway  Mallampati: III  TM Distance: 4 - 6 cm  Neck ROM: normal range of motion   Mouth opening: Normal     Cardiovascular    Rhythm: regular  Rate: normal         Dental    Dentition: Caps/crowns     Pulmonary  Breath sounds clear to auscultation               Abdominal  GI exam deferred       Other Findings            Anesthetic Plan    ASA: 2  Anesthesia type: total IV anesthesia    Monitoring Plan: BIS      Induction: Intravenous  Anesthetic plan and risks discussed with: Patient

## 2017-02-22 NOTE — ANESTHESIA POSTPROCEDURE EVALUATION
Post-Anesthesia Evaluation and Assessment    Patient: Renetta Farooq MRN: [id number]  SSN: [id number]    YOB: 1940  Age: 68 y.o. Sex: female       Cardiovascular Function/Vital Signs  Visit Vitals    /61    Pulse 75    Temp 36.8 °C (98.2 °F)    Resp 17    Ht 5' 1\" (1.549 m)    Wt 83.6 kg (184 lb 4.9 oz)    SpO2 94%    BMI 34.82 kg/m2       Patient is status post total IV anesthesia anesthesia for Procedure(s):  EXCISION SCALP CYSTS. Nausea/Vomiting: None    Postoperative hydration reviewed and adequate. Pain:  Pain Scale 1: Numeric (0 - 10) (02/22/17 1330)  Pain Intensity 1: 0 (02/22/17 1330)   Managed    Neurological Status:   Neuro (WDL): Within Defined Limits (02/22/17 1313)  Neuro  Neurologic State: Alert (02/22/17 1313)  Orientation Level: Appropriate for age (02/22/17 1313)  Cognition: Appropriate for age attention/concentration; Follows commands (02/22/17 1313)  Speech: Clear (02/22/17 1313)  LUE Motor Response: Purposeful (02/22/17 1313)  LLE Motor Response: Purposeful (02/22/17 1313)  RUE Motor Response: Purposeful (02/22/17 1313)  RLE Motor Response: Purposeful (02/22/17 1313)   At baseline    Mental Status and Level of Consciousness: Arousable    Pulmonary Status:   O2 Device: Room air (02/22/17 1330)   Adequate oxygenation and airway patent    Complications related to anesthesia: None    Post-anesthesia assessment completed.  No concerns    Signed By: Val Lundberg MD     February 22, 2017

## 2017-03-13 DIAGNOSIS — E11.9 TYPE 2 DIABETES MELLITUS WITHOUT COMPLICATION, WITHOUT LONG-TERM CURRENT USE OF INSULIN (HCC): Primary | ICD-10-CM

## 2017-03-13 DIAGNOSIS — E78.00 PURE HYPERCHOLESTEROLEMIA: ICD-10-CM

## 2017-03-13 RX ORDER — ATORVASTATIN CALCIUM 20 MG/1
20 TABLET, FILM COATED ORAL DAILY
Qty: 90 TAB | Refills: 3 | Status: SHIPPED | OUTPATIENT
Start: 2017-03-13 | End: 2018-04-16 | Stop reason: SDUPTHER

## 2017-03-13 RX ORDER — METFORMIN HYDROCHLORIDE EXTENDED-RELEASE TABLETS 1000 MG/1
1000 TABLET, FILM COATED, EXTENDED RELEASE ORAL 2 TIMES DAILY
Qty: 180 TAB | Refills: 3 | Status: SHIPPED | OUTPATIENT
Start: 2017-03-13 | End: 2017-04-12 | Stop reason: ALTCHOICE

## 2017-04-12 ENCOUNTER — OFFICE VISIT (OUTPATIENT)
Dept: FAMILY MEDICINE CLINIC | Age: 77
End: 2017-04-12

## 2017-04-12 VITALS
BODY MASS INDEX: 33.99 KG/M2 | SYSTOLIC BLOOD PRESSURE: 126 MMHG | WEIGHT: 180 LBS | OXYGEN SATURATION: 96 % | RESPIRATION RATE: 18 BRPM | DIASTOLIC BLOOD PRESSURE: 58 MMHG | TEMPERATURE: 98.2 F | HEART RATE: 86 BPM | HEIGHT: 61 IN

## 2017-04-12 DIAGNOSIS — E11.9 TYPE 2 DIABETES MELLITUS WITHOUT COMPLICATION, WITHOUT LONG-TERM CURRENT USE OF INSULIN (HCC): ICD-10-CM

## 2017-04-12 DIAGNOSIS — I10 ESSENTIAL HYPERTENSION: ICD-10-CM

## 2017-04-12 DIAGNOSIS — Z13.39 SCREENING FOR ALCOHOLISM: ICD-10-CM

## 2017-04-12 DIAGNOSIS — Z00.00 ROUTINE GENERAL MEDICAL EXAMINATION AT A HEALTH CARE FACILITY: Primary | ICD-10-CM

## 2017-04-12 DIAGNOSIS — E78.00 PURE HYPERCHOLESTEROLEMIA: ICD-10-CM

## 2017-04-12 DIAGNOSIS — Z13.31 SCREENING FOR DEPRESSION: ICD-10-CM

## 2017-04-12 DIAGNOSIS — Z78.0 POSTMENOPAUSAL: ICD-10-CM

## 2017-04-12 DIAGNOSIS — E11.21 TYPE 2 DIABETES MELLITUS WITH DIABETIC NEPHROPATHY, WITHOUT LONG-TERM CURRENT USE OF INSULIN (HCC): ICD-10-CM

## 2017-04-12 RX ORDER — METFORMIN HYDROCHLORIDE 500 MG/1
1000 TABLET, EXTENDED RELEASE ORAL 2 TIMES DAILY
Qty: 360 TAB | Refills: 3
Start: 2017-04-12 | End: 2017-05-19

## 2017-04-12 NOTE — MR AVS SNAPSHOT
Visit Information Date & Time Provider Department Dept. Phone Encounter #  
 4/12/2017 10:30 AM Celena Ramos MD [address] [place] [address] [id number] Your Appointments 10/4/2017  1:20 PM  
ESTABLISHED PATIENT with Celena Ramos MD  
Radha Ritter ([address]) Appt Note: 6 month f/u  
 [address] [facility] [address] [facility] [address] [phone]  
  
   
 [address] [address] Upcoming Health Maintenance Date Due OSTEOPOROSIS SCREENING (DEXA) 2/16/2005 DTaP/Tdap/Td series (1 - Tdap) 4/12/2009 MEDICARE YEARLY EXAM 1/11/2017 EYE EXAM RETINAL OR DILATED Q1 6/21/2017 HEMOGLOBIN A1C Q6M 7/11/2017 FOOT EXAM Q1 10/12/2017 LIPID PANEL Q1 10/12/2017 MICROALBUMIN Q1 1/11/2018 GLAUCOMA SCREENING Q2Y 6/21/2018 Allergies as of 4/12/2017  Review Complete On: 4/12/2017 By: Celena Ramos MD  
  
 Severity Noted Reaction Type Reactions Ace Inhibitors  10/18/2013    Unable to Obtain Antihist [Diphenhydramine Hcl]  10/18/2013    Hives Zithromax [Azithromycin]  10/18/2013    Itching Current Immunizations  Reviewed on 10/12/2016 Name Date Influenza High Dose Vaccine PF 10/12/2016 Influenza Vaccine 10/8/2015, 10/18/2013 Pneumococcal Conjugate (PCV-13) 10/8/2015 Pneumococcal Polysaccharide (PPSV-23) 1/12/2001 Td 4/11/2009 Zoster Vaccine, Live 7/21/2014 Not reviewed this visit You Were Diagnosed With   
  
 Codes Comments Routine general medical examination at a health care facility    -  Primary ICD-10-CM: Z00.00 ICD-9-CM: V70.0 Type 2 diabetes mellitus with diabetic nephropathy, without long-term current use of insulin (HCC)     ICD-10-CM: E11.21 
ICD-9-CM: 250.40, 583.81 Pure hypercholesterolemia     ICD-10-CM: E78.00 ICD-9-CM: 272.0 Essential hypertension     ICD-10-CM: I10 
ICD-9-CM: 401.9 Type 2 diabetes mellitus without complication, without long-term current use of insulin (HCC)     ICD-10-CM: E11.9 ICD-9-CM: 250.00 Screening for alcoholism     ICD-10-CM: Z13.89 ICD-9-CM: V79.1 Screening for depression     ICD-10-CM: Z13.89 ICD-9-CM: V79.0 Postmenopausal     ICD-10-CM: Z78.0 ICD-9-CM: V49.81 Vitals BP Pulse Temp Resp Height(growth percentile) Weight(growth percentile) 126/58 86 98.2 °F (36.8 °C) (Oral) 18 5' 1\" (1.549 m) 180 lb (81.6 kg) SpO2 BMI OB Status Smoking Status 96% 34.01 kg/m2 Menopause Never Smoker Vitals History BMI and BSA Data Body Mass Index Body Surface Area 34.01 kg/m 2 1.87 m 2 Preferred Pharmacy Pharmacy Name Phone [address], [facility] [phone] Your Updated Medication List  
  
   
This list is accurate as of: 4/12/17 12:38 PM.  Always use your most recent med list.  
  
  
  
  
 AMOXICILLIN PO Take  by mouth. Takes 4 tabs prior to dental work  
  
 atorvastatin 20 mg tablet Commonly known as:  LIPITOR Take 1 Tab by mouth daily. Indications: prevent cardiac disease  
  
 clotrimazole 1 % topical cream  
Commonly known as:  [place] Ill Apply  to affected area two (2) times a day. exenatide microspheres 2 mg/0.65 mL Pnij Commonly known as:  BYDUREON  
2 mg by SubCUTAneous route every seven (7) days. FISH OIL 1,000 mg Cap Generic drug:  omega-3 fatty acids-vitamin e Take 1 Cap by mouth. HYDROcodone-acetaminophen 5-325 mg per tablet Commonly known as:  [place] Carlos Take 1-2 Tabs by mouth every four (4) hours as needed for Pain. Max Daily Amount: 12 Tabs. losartan 100 mg tablet Commonly known as:  COZAAR Take 1 Tab by mouth daily. metFORMIN  mg tablet Commonly known as:  GLUCOPHAGE XR Take 2 Tabs by mouth two (2) times a day. Indications: sugar We Performed the Following [address] [[id number] hospitals] HEMOGLOBIN A1C WITH EAG [69709 CPT(R)] To-Do List   
 04/12/2017 Imaging:  DEXA BONE DENSITY STUDY AXIAL Introducing [facility]! Dear Poli Mcclure: Thank you for requesting a [employer] account. Our records indicate that you already have an active [employer] account. You can access your account anytime at [email or web address] Did you know that you can access your hospital and ER discharge instructions at any time in [employer]? You can also review all of your test results from your hospital stay or ER visit. Additional Information If you have questions, please visit the Frequently Asked Questions section of the [employer] website at [email or web address] Remember, [employer] is NOT to be used for urgent needs. For medical emergencies, dial 911. Now available from your iPhone and Android! Please provide this summary of care documentation to your next provider. Your primary care clinician is listed as Imelda Esteban. If you have any questions after today's visit, please call [phone].

## 2017-04-12 NOTE — PROGRESS NOTES
Isabel Schultz is a 68 y.o. female presenting for/with: Annual Wellness Visit and Diabetes    HPI:  Diabetes. Sugars controlled fairly on last check on metformin and bydureon 1 inj/wk. Improved a little, but weight really not dropping. Hypoglycemia: none  Tolerating current treatment well  Current medications include metformin ER 2000mg/d and bydureon weekly. Lab Results   Component Value Date/Time    Hemoglobin A1c 8.0 01/11/2017 09:54 AM    Hemoglobin A1c 9.6 10/12/2016 10:59 AM    Hemoglobin A1c 10.0 07/11/2016 09:28 AM    Glucose 200 02/14/2017 10:49 AM    Glucose (POC) 128 02/22/2017 01:22 PM    Microalb/Creat ratio (ug/mg creat.) 24.7 01/11/2017 09:54 AM    LDL, calculated 49 10/12/2016 10:59 AM    Creatinine 0.77 02/14/2017 10:49 AM     Lab Results   Component Value Date/Time    Microalb/Creat ratio (ug/mg creat.) 24.7 01/11/2017 09:54 AM     Last eye exam performed  6/16 with DR. Sana Dawkins in [facility], no DR. Last foot exam performed 10/16, no trophic changes or ulcerative lesions and normal monofilament exam    Hypertension. Blood pressures have been improving. Management at last visit included con't current tx. Current regimen: angiotensin II receptor antagonist. Symptoms include no symptoms. Patient denies chest pain, palpitations, peripheral edema. Lab review:   Lab Results   Component Value Date/Time    Sodium 143 02/14/2017 10:49 AM    Potassium 4.2 02/14/2017 10:49 AM    Chloride 107 02/14/2017 10:49 AM    CO2 26 02/14/2017 10:49 AM    Anion gap 10 02/14/2017 10:49 AM    Glucose 200 02/14/2017 10:49 AM    BUN 17 02/14/2017 10:49 AM    Creatinine 0.77 02/14/2017 10:49 AM    BUN/Creatinine ratio 22 02/14/2017 10:49 AM    GFR est AA >60 02/14/2017 10:49 AM    GFR est non-AA >60 02/14/2017 10:49 AM    Calcium 9.2 02/14/2017 10:49 AM     Hyperlipidemia. On lipitor 20. Sommer well. No myalgias, arthralgias, unusual weakness.   Lab Results   Component Value Date/Time    Cholesterol, total 145 10/12/2016 10:59 AM    HDL Cholesterol 56 10/12/2016 10:59 AM    LDL, calculated 49 10/12/2016 10:59 AM    VLDL, calculated 40 10/12/2016 10:59 AM    Triglyceride 202 10/12/2016 10:59 AM     Lab Results   Component Value Date/Time    ALT (SGPT) 23 10/12/2015 09:53 AM    AST (SGOT) 9 10/12/2015 09:53 AM    Alk. phosphatase 96 10/12/2015 09:53 AM    Bilirubin, total 0.3 10/12/2015 09:53 AM     Visit Vitals    /58    Pulse 86    Temp 98.2 °F (36.8 °C) (Oral)    Resp 18    Ht 5' 1\" (1.549 m)    Wt 180 lb (81.6 kg)    SpO2 96%    BMI 34.01 kg/m2     Wt Readings from Last 3 Encounters:   04/12/17 180 lb (81.6 kg)   02/22/17 184 lb 4.9 oz (83.6 kg)   02/14/17 184 lb 4.9 oz (83.6 kg)     BP Readings from Last 3 Encounters:   04/12/17 126/58   02/22/17 148/55   02/14/17 131/70     Physical Examination: General appearance - alert, well appearing, and in no distress  Mental status - alert, oriented to person, place, and time  Eyes - pupils equal and reactive, extraocular eye movements intact. ENT - bilateral external ears and nose normal. Normal lips. Bilat ears with mod cerumen, L >R. Neck - supple, no significant adenopathy, no thyromegaly or mass  Extremities - peripheral pulses normal, no pedal edema, no clubbing or cyanosis. A/P:  DM2  Doing well on bydureon 1 inj/week and metformin, yulissa well, but weight not dropping. Check a1c. If A1c not improving, plan start amaryl 1mg every day. HTN  Ok today. Con't current tx. HLD  well controlled. con't current tx. F/U 3mo/PRN  ______________________________________________________________________    Shweta Villagran is a 68 y.o. female and presents for annual Medicare Wellness Visit. Problem List: Reviewed with patient and discussed risk factors.     Patient Active Problem List   Diagnosis Code    Hyperlipidemia E78.5    HTN (hypertension) I10    Type 2 diabetes mellitus with diabetic nephropathy ([facility] [address] E11.21    Advance directive on file Z78.9 Current medical providers:  Patient Care Team:  Gael Yao MD as PCP - General (Family Practice)    PMH, SH, Medications/Allergies: reviewed, on chart. Female Alcohol Screening: On any occasion during the past 3 months, have you had more than 3 drinks containing alcohol? No    Do you average more than 7 drinks per week? No    ROS:  Constitutional: No fever, chills or weight loss  Respiratory: No cough, SOB   CV: No chest pain or Palpitations    Objective:  Visit Vitals    /58    Pulse 86    Temp 98.2 °F (36.8 °C) (Oral)    Resp 18    Ht 5' 1\" (1.549 m)    Wt 180 lb (81.6 kg)    SpO2 96%    BMI 34.01 kg/m2    Body mass index is 34.01 kg/(m^2). Assessment of cognitive impairment: Alert and oriented x 3    Depression Screen:   PHQ 2 / 9, over the last two weeks 4/12/2017   Little interest or pleasure in doing things Not at all   Feeling down, depressed or hopeless Not at all   Total Score PHQ 2 0       Fall Risk Assessment:    Fall Risk Assessment, last 12 mths 4/12/2017   Able to walk? Yes   Fall in past 12 months? No       Functional Ability:   Does the patient exhibit a steady gait? yes   How long did it take the patient to get up and walk from a sitting position? 2s   Is the patient self reliant?  (ie can do own laundry, meals, household chores)  yes     Does the patient handle his/her own medications? yes     Does the patient handle his/her own money? yes     Is the patients home safe (ie good lighting, handrails on stairs and bath, etc.)? yes     Did you notice or did patient express any hearing difficulties? no     Did you notice or did patient express any vision difficulties? no       Advance Care Planning:   Patient was offered the opportunity to discuss advance care planning:  yes     Does patient have an Advance Directive:  yes   If no, did you provide information on Caring Connections?   NA       Plan:      Orders Placed This Encounter    Depression Screen Annual    DEXA BONE DENSITY STUDY AXIAL    HEMOGLOBIN A1C WITH EAG    metFORMIN ER (GLUCOPHAGE XR) 500 mg tablet       Health Maintenance   Topic Date Due    OSTEOPOROSIS SCREENING (DEXA)  02/16/2005    DTaP/Tdap/Td series (1 - Tdap) 04/12/2009    Pneumococcal 65+ Low/Medium Risk (2 of 2 - PPSV23) 10/08/2016    MEDICARE YEARLY EXAM  01/11/2017    EYE EXAM RETINAL OR DILATED Q1  06/21/2017    HEMOGLOBIN A1C Q6M  07/11/2017    FOOT EXAM Q1  10/12/2017    LIPID PANEL Q1  10/12/2017    MICROALBUMIN Q1  01/11/2018    GLAUCOMA SCREENING Q2Y  06/21/2018    ZOSTER VACCINE AGE 60>  Completed    INFLUENZA AGE 9 TO ADULT  Completed       *Patient verbalized understanding and agreement with the plan. A copy of the After Visit Summary with personalized health plan was given to the patient today.

## 2017-04-13 LAB
EST. AVERAGE GLUCOSE BLD GHB EST-MCNC: 174 MG/DL
HBA1C MFR BLD: 7.7 % (ref 4.8–5.6)

## 2017-05-22 DIAGNOSIS — E11.21 TYPE 2 DIABETES MELLITUS WITH DIABETIC NEPHROPATHY, WITHOUT LONG-TERM CURRENT USE OF INSULIN (HCC): Primary | ICD-10-CM

## 2017-05-22 DIAGNOSIS — E11.21 TYPE 2 DIABETES MELLITUS WITH DIABETIC NEPHROPATHY, WITHOUT LONG-TERM CURRENT USE OF INSULIN (HCC): ICD-10-CM

## 2017-05-22 RX ORDER — METFORMIN HYDROCHLORIDE 500 MG/1
1000 TABLET, EXTENDED RELEASE ORAL 2 TIMES DAILY
Qty: 360 TAB | Refills: 3 | Status: SHIPPED | OUTPATIENT
Start: 2017-05-22 | End: 2018-05-01 | Stop reason: SDUPTHER

## 2017-06-14 ENCOUNTER — TELEPHONE (OUTPATIENT)
Dept: FAMILY MEDICINE CLINIC | Age: 77
End: 2017-06-14

## 2017-06-14 NOTE — TELEPHONE ENCOUNTER
Bydureon pen $700/90d, tanzeum $587/90d. In the donut hole.  PT referred to [address] [place] [address], pt will let me know if I have to give her an Rx to mail or fax off to the program.

## 2017-06-14 NOTE — TELEPHONE ENCOUNTER
Patient advised to call her insurance company to find out what they will cover. Patient advised that we do have samples if she needs them.

## 2017-06-14 NOTE — TELEPHONE ENCOUNTER
Pt called concerning a call that was received and would like a call back. Pt best contact number [phone]     Message from Call Center.

## 2017-06-14 NOTE — TELEPHONE ENCOUNTER
Needs to see if there is an alternative for her Bydureon. $600 thru [employer]. Is there anything that might be available thru [facility]?

## 2017-10-05 DIAGNOSIS — I10 ESSENTIAL HYPERTENSION: ICD-10-CM

## 2017-10-05 RX ORDER — LOSARTAN POTASSIUM 100 MG/1
TABLET ORAL
Qty: 90 TAB | Refills: 3 | Status: SHIPPED | OUTPATIENT
Start: 2017-10-05 | End: 2018-04-16 | Stop reason: SDUPTHER

## 2017-11-01 ENCOUNTER — OFFICE VISIT (OUTPATIENT)
Dept: FAMILY MEDICINE CLINIC | Age: 77
End: 2017-11-01

## 2017-11-01 VITALS
DIASTOLIC BLOOD PRESSURE: 58 MMHG | HEART RATE: 81 BPM | BODY MASS INDEX: 33.23 KG/M2 | OXYGEN SATURATION: 97 % | HEIGHT: 61 IN | TEMPERATURE: 98.7 F | WEIGHT: 176 LBS | SYSTOLIC BLOOD PRESSURE: 139 MMHG

## 2017-11-01 DIAGNOSIS — Z23 ENCOUNTER FOR IMMUNIZATION: ICD-10-CM

## 2017-11-01 DIAGNOSIS — I10 ESSENTIAL HYPERTENSION: ICD-10-CM

## 2017-11-01 DIAGNOSIS — E78.00 PURE HYPERCHOLESTEROLEMIA: ICD-10-CM

## 2017-11-01 DIAGNOSIS — E11.21 TYPE 2 DIABETES MELLITUS WITH DIABETIC NEPHROPATHY, WITHOUT LONG-TERM CURRENT USE OF INSULIN (HCC): Primary | ICD-10-CM

## 2017-11-01 NOTE — MR AVS SNAPSHOT
Visit Information Date & Time Provider Department Dept. Phone Encounter #  
 11/1/2017 11:30 AM Barrie Rosenberg MD [facility] [id number] Follow-up Instructions Return in about 6 months (around 5/1/2018). Follow-up and Disposition History Upcoming Health Maintenance Date Due  
 EYE EXAM RETINAL OR DILATED Q1 6/21/2017 INFLUENZA AGE 9 TO ADULT 8/1/2017 FOOT EXAM Q1 10/12/2017 HEMOGLOBIN A1C Q6M 10/12/2017 LIPID PANEL Q1 10/12/2017 MICROALBUMIN Q1 1/11/2018 MEDICARE YEARLY EXAM 4/13/2018 GLAUCOMA SCREENING Q2Y 6/21/2018 DTaP/Tdap/Td series (2 - Td) 11/1/2027 Allergies as of 11/1/2017  Review Complete On: 11/1/2017 By: Barrie Rosenberg MD  
  
 Severity Noted Reaction Type Reactions Ace Inhibitors  10/18/2013    Unable to Obtain Antihist [Diphenhydramine Hcl]  10/18/2013    Hives Zithromax [Azithromycin]  10/18/2013    Itching Current Immunizations  Reviewed on 10/12/2016 Name Date Influenza High Dose Vaccine PF 11/1/2017, 10/12/2016 Influenza Vaccine 10/8/2015, 10/18/2013 Pneumococcal Conjugate (PCV-13) 10/8/2015 Pneumococcal Polysaccharide (PPSV-23) 1/12/2001 Td 4/11/2009 Zoster Vaccine, Live 7/21/2014 Not reviewed this visit You Were Diagnosed With   
  
 Codes Comments Type 2 diabetes mellitus with diabetic nephropathy, without long-term current use of insulin (HCC)    -  Primary ICD-10-CM: E11.21 
ICD-9-CM: 250.40, 583.81 Encounter for immunization     ICD-10-CM: D62 ICD-9-CM: V03.89 Pure hypercholesterolemia     ICD-10-CM: E78.00 ICD-9-CM: 272.0 Essential hypertension     ICD-10-CM: I10 
ICD-9-CM: 401.9 Vitals BP Pulse Temp Height(growth percentile) Weight(growth percentile) 139/58 (BP 1 Location: Left arm, BP Patient Position: Sitting) 81 98.7 °F (37.1 °C) (Temporal) 5' 1\" (1.549 m) 176 lb (79.8 kg) SpO2 BMI OB Status Smoking Status 97% 33.25 kg/m2 Menopause Never Smoker Vitals History BMI and BSA Data Body Mass Index Body Surface Area  
 33.25 kg/m 2 1.85 m 2 Preferred Pharmacy Pharmacy Name Phone [facility] [phone] Your Updated Medication List  
  
   
This list is accurate as of: 11/1/17 12:23 PM.  Always use your most recent med list.  
  
  
  
  
 AMOXICILLIN PO Take  by mouth. Takes 4 tabs prior to dental work  
  
 atorvastatin 20 mg tablet Commonly known as:  LIPITOR Take 1 Tab by mouth daily. Indications: prevent cardiac disease  
  
 clotrimazole 1 % topical cream  
Commonly known as:  Deepa  Apply  to affected area two (2) times a day. exenatide microspheres 2 mg/0.65 mL Pnij Commonly known as:  BYDUREON  
2 mg by SubCUTAneous route every seven (7) days. FISH OIL 1,000 mg Cap Generic drug:  omega-3 fatty acids-vitamin e Take 1 Cap by mouth.  
  
 losartan 100 mg tablet Commonly known as:  COZAAR  
TAKE 1 TABLET DAILY  
  
 metFORMIN  mg tablet Commonly known as:  GLUCOPHAGE XR Take 2 Tabs by mouth two (2) times a day. Indications: type 2 diabetes mellitus We Performed the Following ADMIN INFLUENZA VIRUS VAC [ [facility]] HEMOGLOBIN A1C WITH EAG [84028 CPT(R)]  DIABETES FOOT EXAM [[facility]] INFLUENZA VIRUS VACCINE, HIGH DOSE SEASONAL, PRESERVATIVE FREE [75410 CPT(R)] LIPID PANEL [42537 CPT(R)] METABOLIC PANEL, COMPREHENSIVE [47088 CPT(R)] Follow-up Instructions Return in about 6 months (around 5/1/2018). Patient Instructions Learning About Diabetes Food Guidelines Your Care Instructions Meal planning is important to manage diabetes. It helps keep your blood sugar at a target level (which you set with your doctor). You don't have to eat special foods.  You can eat what your family eats, including sweets once in a while. But you do have to pay attention to how often you eat and how much you eat of certain foods. You may want to work with a dietitian or a certified diabetes educator (CDE) to help you plan meals and snacks. A dietitian or CDE can also help you lose weight if that is one of your goals. What should you know about eating carbs? Managing the amount of carbohydrate (carbs) you eat is an important part of healthy meals when you have diabetes. Carbohydrate is found in many foods. · Learn which foods have carbs. And learn the amounts of carbs in different foods. ¨ Bread, cereal, pasta, and rice have about 15 grams of carbs in a serving. A serving is 1 slice of bread (1 ounce), ½ cup of cooked cereal, or 1/3 cup of cooked pasta or rice. ¨ Fruits have 15 grams of carbs in a serving. A serving is 1 small fresh fruit, such as an apple or orange; ½ of a banana; ½ cup of cooked or canned fruit; ½ cup of fruit juice; 1 cup of melon or raspberries; or 2 tablespoons of dried fruit. ¨ Milk and no-sugar-added yogurt have 15 grams of carbs in a serving. A serving is 1 cup of milk or 2/3 cup of no-sugar-added yogurt. ¨ Starchy vegetables have 15 grams of carbs in a serving. A serving is ½ cup of mashed potatoes or sweet potato; 1 cup winter squash; ½ of a small baked potato; ½ cup of cooked beans; or ½ cup cooked corn or green peas. · Learn how much carbs to eat each day and at each meal. A dietitian or CDE can teach you how to keep track of the amount of carbs you eat. This is called carbohydrate counting. · If you are not sure how to count carbohydrate grams, use the Plate Method to plan meals. It is a good, quick way to make sure that you have a balanced meal. It also helps you spread carbs throughout the day. ¨ Divide your plate by types of foods.  Put non-starchy vegetables on half the plate, meat or other protein food on one-quarter of the plate, and a grain or starchy vegetable in the final quarter of the plate. To this you can add a small piece of fruit and 1 cup of milk or yogurt, depending on how many carbs you are supposed to eat at a meal. 
· Try to eat about the same amount of carbs at each meal. Do not \"save up\" your daily allowance of carbs to eat at one meal. 
· Proteins have very little or no carbs per serving. Examples of proteins are beef, chicken, turkey, fish, eggs, tofu, cheese, cottage cheese, and peanut butter. A serving size of meat is 3 ounces, which is about the size of a deck of cards. Examples of meat substitute serving sizes (equal to 1 ounce of meat) are 1/4 cup of cottage cheese, 1 egg, 1 tablespoon of peanut butter, and ½ cup of tofu. How can you eat out and still eat healthy? · Learn to estimate the serving sizes of foods that have carbohydrate. If you measure food at home, it will be easier to estimate the amount in a serving of restaurant food. · If the meal you order has too much carbohydrate (such as potatoes, corn, or baked beans), ask to have a low-carbohydrate food instead. Ask for a salad or green vegetables. · If you use insulin, check your blood sugar before and after eating out to help you plan how much to eat in the future. · If you eat more carbohydrate at a meal than you had planned, take a walk or do other exercise. This will help lower your blood sugar. What else should you know? · Limit saturated fat, such as the fat from meat and dairy products. This is a healthy choice because people who have diabetes are at higher risk of heart disease. So choose lean cuts of meat and nonfat or low-fat dairy products. Use olive or canola oil instead of butter or shortening when cooking. · Don't skip meals. Your blood sugar may drop too low if you skip meals and take insulin or certain medicines for diabetes. · Check with your doctor before you drink alcohol.  Alcohol can cause your blood sugar to drop too low. Alcohol can also cause a bad reaction if you take certain diabetes medicines. Follow-up care is a key part of your treatment and safety. Be sure to make and go to all appointments, and call your doctor if you are having problems. It's also a good idea to know your test results and keep a list of the medicines you take. Where can you learn more? Go to http://yonatan-edel.info/. Enter C538 in the search box to learn more about \"Learning About Diabetes Food Guidelines. \" Current as of: March 13, 2017 Content Version: 11.4 © 2625-4852 [employer]. Care instructions adapted under license by [employer] (which disclaims liability or warranty for this information). If you have questions about a medical condition or this instruction, always ask your healthcare professional. [address] any warranty or liability for your use of this information. Introducing [facility]! Dear Elaine Escalona: Thank you for requesting a scanR account. Our records indicate that you already have an active scanR account. You can access your account anytime at [email or web address] Did you know that you can access your hospital and ER discharge instructions at any time in scanR? You can also review all of your test results from your hospital stay or ER visit. Additional Information If you have questions, please visit the Frequently Asked Questions section of the scanR website at [email or web address] Remember, scanR is NOT to be used for urgent needs. For medical emergencies, dial 911. Now available from your iPhone and Android! Please provide this summary of care documentation to your next provider. Your primary care clinician is listed as Kelli Esteban. If you have any questions after today's visit, please call [phone].

## 2017-11-01 NOTE — PROGRESS NOTES
Shira Canales is a 68 y.o. female presenting for/with:    Diabetes    HPI:  Diabetes. Sugars controlled fairly on last check on metformin and bydureon 1 inj/wk. Improved a little, but weight really not dropping. Hypoglycemia: none  Tolerating current treatment well  Current medications include metformin ER 2000mg/d and bydureon weekly. Lab Results   Component Value Date/Time    Hemoglobin A1c 7.7 04/12/2017 12:30 PM    Hemoglobin A1c 8.0 01/11/2017 09:54 AM    Hemoglobin A1c 9.6 10/12/2016 10:59 AM    Glucose 200 02/14/2017 10:49 AM    Glucose (POC) 128 02/22/2017 01:22 PM    Microalb/Creat ratio (ug/mg creat.) 24.7 01/11/2017 09:54 AM    LDL, calculated 49 10/12/2016 10:59 AM    Creatinine 0.77 02/14/2017 10:49 AM     Lab Results   Component Value Date/Time    Microalb/Creat ratio (ug/mg creat.) 24.7 01/11/2017 09:54 AM     Last eye exam performed  6/16 with DR. Wing Carreon in [address] [place] [address], no DR. Pt plans to make recheck appt soon. Last foot exam performed 10/16, no trophic changes or ulcerative lesions and normal monofilament exam    Hypertension. Blood pressures up lately. Management at last visit included con't current tx. Current regimen: angiotensin II receptor antagonist. Symptoms include no symptoms. Patient denies chest pain, palpitations, peripheral edema. Lab review:   Lab Results   Component Value Date/Time    Sodium 143 02/14/2017 10:49 AM    Potassium 4.2 02/14/2017 10:49 AM    Chloride 107 02/14/2017 10:49 AM    CO2 26 02/14/2017 10:49 AM    Anion gap 10 02/14/2017 10:49 AM    Glucose 200 02/14/2017 10:49 AM    BUN 17 02/14/2017 10:49 AM    Creatinine 0.77 02/14/2017 10:49 AM    BUN/Creatinine ratio 22 02/14/2017 10:49 AM    GFR est AA >60 02/14/2017 10:49 AM    GFR est non-AA >60 02/14/2017 10:49 AM    Calcium 9.2 02/14/2017 10:49 AM     Hyperlipidemia. On lipitor 20. Sommer well. No myalgias, arthralgias, unusual weakness.   Lab Results   Component Value Date/Time    Cholesterol, total 145 10/12/2016 10:59 AM    HDL Cholesterol 56 10/12/2016 10:59 AM    LDL, calculated 49 10/12/2016 10:59 AM    VLDL, calculated 40 10/12/2016 10:59 AM    Triglyceride 202 10/12/2016 10:59 AM     Lab Results   Component Value Date/Time    ALT (SGPT) 23 10/12/2015 09:53 AM    AST (SGOT) 9 10/12/2015 09:53 AM    Alk. phosphatase 96 10/12/2015 09:53 AM    Bilirubin, total 0.3 10/12/2015 09:53 AM     Visit Vitals    /63    Pulse 81    Temp 98.7 °F (37.1 °C) (Temporal)    Ht 5' 1\" (1.549 m)    Wt 176 lb (79.8 kg)    SpO2 97%    BMI 33.25 kg/m2     Wt Readings from Last 3 Encounters:   11/01/17 176 lb (79.8 kg)   04/12/17 180 lb (81.6 kg)   02/22/17 184 lb 4.9 oz (83.6 kg)   -4#  BP Readings from Last 3 Encounters:   11/01/17 151/63   04/12/17 126/58   02/22/17 148/55     Physical Examination: General appearance - alert, well appearing, and in no distress  Mental status - alert, oriented to person, place, and time  Eyes - pupils equal and reactive, extraocular eye movements intact. ENT - bilateral external ears and nose normal. Normal lips. Bilat ears with mod cerumen, L >R. Neck - supple, no significant adenopathy, no thyromegaly or mass  Extremities - peripheral pulses normal, no pedal edema, no clubbing or cyanosis. Foot check: No calluses, no tinea. DP, PT pulses 2+ bilat. Monofilament test normal bilat. A/P:  DM2  Doing well on bydureon 1 inj/week and metformin, yulissa well, weight finally dropping. Check a1c. If A1c not improving, plan start amaryl 1mg every day. HTN  Ok today. Con't current tx. HLD  well controlled. con't current tx.      F/U 3mo/PRN

## 2017-11-01 NOTE — PATIENT INSTRUCTIONS

## 2017-11-02 LAB
ALBUMIN SERPL-MCNC: 4.1 G/DL (ref 3.5–4.8)
ALBUMIN/GLOB SERPL: 1.7 {RATIO} (ref 1.2–2.2)
ALP SERPL-CCNC: 91 IU/L (ref 39–117)
ALT SERPL-CCNC: 15 IU/L (ref 0–32)
AST SERPL-CCNC: 10 IU/L (ref 0–40)
BILIRUB SERPL-MCNC: 0.2 MG/DL (ref 0–1.2)
BUN SERPL-MCNC: 17 MG/DL (ref 8–27)
BUN/CREAT SERPL: 30 (ref 12–28)
CALCIUM SERPL-MCNC: 10 MG/DL (ref 8.7–10.3)
CHLORIDE SERPL-SCNC: 103 MMOL/L (ref 96–106)
CHOLEST SERPL-MCNC: 140 MG/DL (ref 100–199)
CO2 SERPL-SCNC: 19 MMOL/L (ref 18–29)
CREAT SERPL-MCNC: 0.56 MG/DL (ref 0.57–1)
EST. AVERAGE GLUCOSE BLD GHB EST-MCNC: 151 MG/DL
GFR SERPLBLD CREATININE-BSD FMLA CKD-EPI: 104 ML/MIN/1.73
GFR SERPLBLD CREATININE-BSD FMLA CKD-EPI: 90 ML/MIN/1.73
GLOBULIN SER CALC-MCNC: 2.4 G/DL (ref 1.5–4.5)
GLUCOSE SERPL-MCNC: 142 MG/DL (ref 65–99)
HBA1C MFR BLD: 6.9 % (ref 4.8–5.6)
HDLC SERPL-MCNC: 52 MG/DL
LDLC SERPL CALC-MCNC: 42 MG/DL (ref 0–99)
POTASSIUM SERPL-SCNC: 4.4 MMOL/L (ref 3.5–5.2)
PROT SERPL-MCNC: 6.5 G/DL (ref 6–8.5)
SODIUM SERPL-SCNC: 144 MMOL/L (ref 134–144)
TRIGL SERPL-MCNC: 228 MG/DL (ref 0–149)
VLDLC SERPL CALC-MCNC: 46 MG/DL (ref 5–40)

## 2018-03-23 NOTE — TELEPHONE ENCOUNTER
[phone] contact # per Mari Minaya need refill called in to [address] [facility] for the following medications: exenatide microspheres 2 mg/0.65 mL    Thanks,

## 2018-04-16 DIAGNOSIS — E78.00 PURE HYPERCHOLESTEROLEMIA: ICD-10-CM

## 2018-04-16 DIAGNOSIS — I10 ESSENTIAL HYPERTENSION: ICD-10-CM

## 2018-04-16 RX ORDER — ATORVASTATIN CALCIUM 20 MG/1
20 TABLET, FILM COATED ORAL DAILY
Qty: 90 TAB | Refills: 3 | Status: SHIPPED | OUTPATIENT
Start: 2018-04-16 | End: 2019-01-23 | Stop reason: SDUPTHER

## 2018-04-16 RX ORDER — LOSARTAN POTASSIUM 100 MG/1
TABLET ORAL
Qty: 90 TAB | Refills: 3 | Status: SHIPPED | OUTPATIENT
Start: 2018-04-16 | End: 2019-01-23 | Stop reason: SDUPTHER

## 2018-04-16 NOTE — TELEPHONE ENCOUNTER
----- Message from Charlotte Montana sent at 4/16/2018 12:03 PM EDT -----  Regarding: Dr. Marie Nurse  Patient needs a refill of Losartin and Atorvastatin sent to [facility] at [phone]. The patient's number is [phone].

## 2018-05-01 DIAGNOSIS — E11.21 TYPE 2 DIABETES MELLITUS WITH DIABETIC NEPHROPATHY, WITHOUT LONG-TERM CURRENT USE OF INSULIN (HCC): ICD-10-CM

## 2018-05-01 RX ORDER — METFORMIN HYDROCHLORIDE 500 MG/1
1000 TABLET, EXTENDED RELEASE ORAL 2 TIMES DAILY
Qty: 360 TAB | Refills: 3 | Status: SHIPPED | OUTPATIENT
Start: 2018-05-01 | End: 2019-05-08 | Stop reason: SDUPTHER

## 2018-05-07 ENCOUNTER — OFFICE VISIT (OUTPATIENT)
Dept: FAMILY MEDICINE CLINIC | Age: 78
End: 2018-05-07

## 2018-05-07 VITALS
HEIGHT: 61 IN | SYSTOLIC BLOOD PRESSURE: 140 MMHG | BODY MASS INDEX: 32.36 KG/M2 | RESPIRATION RATE: 14 BRPM | WEIGHT: 171.4 LBS | HEART RATE: 76 BPM | OXYGEN SATURATION: 98 % | TEMPERATURE: 98.4 F | DIASTOLIC BLOOD PRESSURE: 62 MMHG

## 2018-05-07 DIAGNOSIS — Z00.00 MEDICARE ANNUAL WELLNESS VISIT, SUBSEQUENT: Primary | ICD-10-CM

## 2018-05-07 DIAGNOSIS — E11.21 TYPE 2 DIABETES MELLITUS WITH DIABETIC NEPHROPATHY, WITHOUT LONG-TERM CURRENT USE OF INSULIN (HCC): ICD-10-CM

## 2018-05-07 DIAGNOSIS — Z13.31 SCREENING FOR DEPRESSION: ICD-10-CM

## 2018-05-07 DIAGNOSIS — I10 ESSENTIAL HYPERTENSION: ICD-10-CM

## 2018-05-07 DIAGNOSIS — D12.6 TUBULAR ADENOMA OF COLON: ICD-10-CM

## 2018-05-07 DIAGNOSIS — Z13.39 SCREENING FOR ALCOHOLISM: ICD-10-CM

## 2018-05-07 DIAGNOSIS — E78.00 PURE HYPERCHOLESTEROLEMIA: ICD-10-CM

## 2018-05-07 LAB
ALBUMIN UR QL STRIP: 10 MG/L
CREATININE, URINE POC: 100 MG/DL
MICROALBUMIN/CREAT RATIO POC: <30 MG/G

## 2018-05-07 NOTE — MR AVS SNAPSHOT
[address] [facility] [phone] Patient: Luz Cowan MRN: [id number] WOB:0[birth year]65[birth year]3940 Visit Information Date & Time Provider Department Dept. Phone Encounter #  
 5/7/2018 11:10 AM Lina Trevino MD [address] [place] [address] [id number] Upcoming Health Maintenance Date Due  
 EYE EXAM RETINAL OR DILATED Q1 6/21/2017 MICROALBUMIN Q1 1/11/2018 MEDICARE YEARLY EXAM 4/13/2018 HEMOGLOBIN A1C Q6M 5/1/2018 GLAUCOMA SCREENING Q2Y 6/21/2018 Influenza Age 5 to Adult 8/1/2018 FOOT EXAM Q1 11/1/2018 LIPID PANEL Q1 11/1/2018 DTaP/Tdap/Td series (2 - Td) 11/1/2027 Allergies as of 5/7/2018  Review Complete On: 5/7/2018 By: Lina Trevino MD  
  
 Severity Noted Reaction Type Reactions Ace Inhibitors  10/18/2013    Unable to Obtain Antihist [Diphenhydramine Hcl]  10/18/2013    Hives Zithromax [Azithromycin]  10/18/2013    Itching Current Immunizations  Reviewed on 10/12/2016 Name Date Influenza High Dose Vaccine PF 11/1/2017, 10/12/2016 Influenza Vaccine 10/8/2015, 10/18/2013 Pneumococcal Conjugate (PCV-13) 10/8/2015 Pneumococcal Polysaccharide (PPSV-23) 1/12/2001 Td 4/11/2009 Zoster Vaccine, Live 7/21/2014 Not reviewed this visit You Were Diagnosed With   
  
 Codes Comments Medicare annual wellness visit, subsequent    -  Primary ICD-10-CM: Z00.00 ICD-9-CM: V70.0 Tubular adenoma of colon     ICD-10-CM: D12.6 ICD-9-CM: 211.3 Pure hypercholesterolemia     ICD-10-CM: E78.00 ICD-9-CM: 272.0 Essential hypertension     ICD-10-CM: I10 
ICD-9-CM: 401.9 Type 2 diabetes mellitus with diabetic nephropathy, without long-term current use of insulin (HCC)     ICD-10-CM: E11.21 
ICD-9-CM: 250.40, 583.81 Screening for alcoholism     ICD-10-CM: Z13.89 ICD-9-CM: V79.1  Screening for depression     ICD-10-CM: Z13.89 
 ICD-9-CM: V79.0 Vitals BP Pulse Temp Resp Height(growth percentile) 140/62 (BP 1 Location: Left arm, BP Patient Position: Sitting) 76 98.4 °F (36.9 °C) (Temporal) 14 5' 1\" (1.549 m) Weight(growth percentile) SpO2 BMI OB Status Smoking Status 171 lb 6.4 oz (77.7 kg) 98% 32.39 kg/m2 Menopause Never Smoker BMI and BSA Data Body Mass Index Body Surface Area  
 32.39 kg/m 2 1.83 m 2 Preferred Pharmacy Pharmacy Name Phone [address], [address] [phone] Your Updated Medication List  
  
   
This list is accurate as of 5/7/18  1:04 PM.  Always use your most recent med list.  
  
  
  
  
 AMOXICILLIN PO Take  by mouth. Takes 4 tabs prior to dental work  
  
 atorvastatin 20 mg tablet Commonly known as:  LIPITOR Take 1 Tab by mouth daily. Indications: prevent cardiac disease  
  
 clotrimazole 1 % topical cream  
Commonly known as:  Geraline Chill Apply  to affected area two (2) times a day. exenatide microspheres 2 mg/0.65 mL Pnij Commonly known as:  BYDUREON INJECT 2 MG UNDER THE SKIN EVERY 7 DAYS for sugar  
  
 losartan 100 mg tablet Commonly known as:  COZAAR  
TAKE 1 TABLET DAILY for pressure  
  
 metFORMIN  mg tablet Commonly known as:  GLUCOPHAGE XR Take 2 Tabs by mouth two (2) times a day. Indications: type 2 diabetes mellitus We Performed the Following AMB POC URINE, MICROALBUMIN, SEMIQUANT (3 RESULTS) [42369 CPT(R)] [address] [[id number] [facility]] HEMOGLOBIN A1C WITH EAG [21022 CPT(R)] METABOLIC PANEL, COMPREHENSIVE [49485 CPT(R)] CA ANNUAL ALCOHOL SCREEN 15 MIN [id number] [facility]] Patient Instructions Medicare Wellness Visit, Female The best way to live healthy is to have a healthy lifestyle by eating a well-balanced diet, exercising regularly, limiting alcohol and stopping smoking. Regular physical exams and screening tests are another way to keep healthy. Preventive exams provided by your health care provider can find health problems before they become diseases or illnesses. Preventive services including immunizations, screening tests, monitoring and exams can help you take care of your own health. All people over age 72 should have a pneumovax  and and a prevnar shot to prevent pneumonia. These are once in a lifetime unless you and your provider decide differently. All people over 65 should have a yearly flu shot and a tetanus vaccine every 10 years. A bone mass density to screen for osteoporosis or thinning of the bones should be done every 2 years after 65. Screening for diabetes mellitus with a blood sugar test should be done every year. Glaucoma is a disease of the eye due to increased ocular pressure that can lead to blindness and it should be done every year by an eye professional. 
 
Cardiovascular screening tests that check for elevated lipids (fatty part of blood) which can lead to heart disease and strokes should be done every 5 years. Colorectal screening that evaluates for blood or polyps in your colon should be done yearly as a stool test or every five years as a flexible sigmoidoscope or every 10 years as a colonoscopy up to age 76. Breast cancer screening with a mammogram is recommended biennially  for women age 54-69. Screening for cervical cancer with a pap smear and pelvic exam is recommended for women after age 72 years every 2 years up to age 79 or when the provider and patient decide to stop. If there is a history of cervical abnormalities or other increased risk for cancer then the test is recommended yearly. Hepatitis C screening is also recommended for anyone born between [birth year] through [address]. A shingles vaccine is also recommended once in a lifetime after age 61. Your Medicare Wellness Exam is recommended annually. Here is a list of your current Health Maintenance items with a due date: 
Health Maintenance Due Topic Date Due [facility] Eye Exam  06/21/2017  Albumin Urine Test  01/11/2018 [facility] Annual Well Visit  04/13/2018  Hemoglobin A1C    05/01/2018  Glaucoma Screening   06/21/2018 Introducing [facility]! Dear Mick Bowden: Thank you for requesting a Gotuit account. Our records indicate that you already have an active Gotuit account. You can access your account anytime at [email or web address] Did you know that you can access your hospital and ER discharge instructions at any time in Gotuit? You can also review all of your test results from your hospital stay or ER visit. Additional Information If you have questions, please visit the Frequently Asked Questions section of the Gotuit website at [email or web address] Remember, Gotuit is NOT to be used for urgent needs. For medical emergencies, dial 911. Now available from your iPhone and Android! Please provide this summary of care documentation to your next provider. Your primary care clinician is listed as Margie Esteban. If you have any questions after today's visit, please call [phone].

## 2018-05-07 NOTE — PATIENT INSTRUCTIONS

## 2018-05-07 NOTE — PROGRESS NOTES
Daniel Ibarra is a 66 y.o. female presenting for/with: Annual Wellness Visit    HPI:  Diabetes. Sugars controlled fairly on last check on metformin and bydureon 1 inj/wk. Improved a little, but weight really not dropping. Hypoglycemia: none  Tolerating current treatment well  Current medications include metformin ER 2000mg/d and bydureon weekly. Lab Results   Component Value Date/Time    Hemoglobin A1c 6.9 (H) 11/01/2017 12:13 PM    Hemoglobin A1c 7.7 (H) 04/12/2017 12:30 PM    Hemoglobin A1c 8.0 (H) 01/11/2017 09:54 AM    Glucose 142 (H) 11/01/2017 12:13 PM    Glucose (POC) 128 (H) 02/22/2017 01:22 PM    Microalb/Creat ratio (ug/mg creat.) 24.7 01/11/2017 09:54 AM    LDL, calculated 42 11/01/2017 12:13 PM    Creatinine 0.56 (L) 11/01/2017 12:13 PM     Last eye exam performed  6/16 with DR. Shlomo Stout in [facility], no DR. Pt plans to make recheck appt soon. Last foot exam performed 11/17, no trophic changes or ulcerative lesions and normal monofilament exam    Hypertension. Blood pressures about the same, fair lately. Management at last visit included con't current tx. Current regimen: angiotensin II receptor antagonist. Symptoms include no symptoms. Patient denies chest pain, palpitations, peripheral edema. Lab review:   Lab Results   Component Value Date/Time    Sodium 144 11/01/2017 12:13 PM    Potassium 4.4 11/01/2017 12:13 PM    Chloride 103 11/01/2017 12:13 PM    CO2 19 11/01/2017 12:13 PM    Anion gap 10 02/14/2017 10:49 AM    Glucose 142 (H) 11/01/2017 12:13 PM    BUN 17 11/01/2017 12:13 PM    Creatinine 0.56 (L) 11/01/2017 12:13 PM    BUN/Creatinine ratio 30 (H) 11/01/2017 12:13 PM    GFR est  11/01/2017 12:13 PM    GFR est non-AA 90 11/01/2017 12:13 PM    Calcium 10.0 11/01/2017 12:13 PM     Hyperlipidemia. On lipitor 20. Sommer well. No myalgias, arthralgias, unusual weakness.   Lab Results   Component Value Date/Time    Cholesterol, total 140 11/01/2017 12:13 PM    HDL Cholesterol 52 11/01/2017 12:13 PM    LDL, calculated 42 11/01/2017 12:13 PM    VLDL, calculated 46 (H) 11/01/2017 12:13 PM    Triglyceride 228 (H) 11/01/2017 12:13 PM     Lab Results   Component Value Date/Time    ALT (SGPT) 15 11/01/2017 12:13 PM    AST (SGOT) 10 11/01/2017 12:13 PM    Alk. phosphatase 91 11/01/2017 12:13 PM    Bilirubin, total 0.2 11/01/2017 12:13 PM     Visit Vitals    /62 (BP 1 Location: Left arm, BP Patient Position: Sitting)    Pulse 76    Temp 98.4 °F (36.9 °C) (Temporal)    Resp 14    Ht 5' 1\" (1.549 m)    Wt 171 lb 6.4 oz (77.7 kg)    SpO2 98%    BMI 32.39 kg/m2     Wt Readings from Last 3 Encounters:   05/07/18 171 lb 6.4 oz (77.7 kg)   11/01/17 176 lb (79.8 kg)   04/12/17 180 lb (81.6 kg)   -4#  BP Readings from Last 3 Encounters:   05/07/18 140/62   11/01/17 139/58   04/12/17 126/58     Physical Examination: General appearance - alert, well appearing, and in no distress  Mental status - alert, oriented to person, place, and time  Eyes - pupils equal and reactive, extraocular eye movements intact. ENT - bilateral external ears and nose normal. Normal lips. Bilat ears with mod cerumen, L >R. Neck - supple, no significant adenopathy, no thyromegaly or mass  Extremities - peripheral pulses normal, no pedal edema, no clubbing or cyanosis. A/P:  DM2   Doing well on bydureon 1 inj/week and metformin, yulissa well, weight con't to drop. Check a1c. If A1c not improving, plan start amaryl 1mg every day. HTN  Ok today. Con't current tx. HLD  well controlled. con't current tx. F/U 3mo/PRN. Wants lab reports by letter. 1. Have you been to the ER, urgent care clinic since your last visit? Hospitalized since your last visit? No    2. Have you seen or consulted any other health care providers outside of the [address] [place] [address] since your last visit? Include any pap smears or colon screening. Yes When: Dr. Mendy Zamora 2 weeks ago , had adenomatous polyps (tubular adenoma) on colo.  Plan rpt in ~1y.  ______________________________________________________________________    Deborah Kwon is a 66 y.o. female and presents for annual Medicare Wellness Visit. Problem List: Reviewed with patient and discussed risk factors. Patient Active Problem List   Diagnosis Code    Hyperlipidemia E78.5    HTN (hypertension) I10    Type 2 diabetes mellitus with diabetic nephropathy ([facility] [address] E11.21    Advance directive on file Z78.9    Tubular adenoma of colon D12.6       Current medical providers:  Patient Care Team:  Alfonso Aguayo MD as PCP - General (Family Practice)    PM, , Medications/Allergies: reviewed, on chart. Female Alcohol Screening: On any occasion during the past 3 months, have you had more than 3 drinks containing alcohol? No    Do you average more than 7 drinks per week? No      ROS:  Constitutional: No fever, chills or weight loss  Respiratory: No cough, SOB   CV: No chest pain or Palpitations    Objective:  Visit Vitals    /62 (BP 1 Location: Left arm, BP Patient Position: Sitting)    Pulse 76    Temp 98.4 °F (36.9 °C) (Temporal)    Resp 14    Ht 5' 1\" (1.549 m)    Wt 171 lb 6.4 oz (77.7 kg)    SpO2 98%    BMI 32.39 kg/m2    Body mass index is 32.39 kg/(m^2). Assessment of cognitive impairment: Alert and oriented x 3    Depression Screen:   PHQ over the last two weeks 5/7/2018   PHQ Not Done -   Little interest or pleasure in doing things Not at all   Feeling down, depressed or hopeless Not at all   Total Score PHQ 2 0     Fall Risk Assessment:    Fall Risk Assessment, last 12 mths 5/7/2018   Able to walk? Yes   Fall in past 12 months? No   Fall with injury? -   Number of falls in past 12 months -     Functional Ability:   Does the patient exhibit a steady gait? yes   How long did it take the patient to get up and walk from a sitting position?  3 sec   Is the patient self reliant?  (ie can do own laundry, meals, household chores)  yes     Does the patient handle his/her own medications? yes     Does the patient handle his/her own money? yes     Is the patients home safe (ie good lighting, handrails on stairs and bath, etc.)? yes     Did you notice or did patient express any hearing difficulties? yes     Did you notice or did patient express any vision difficulties? yes       Advance Care Planning:   Patient was offered the opportunity to discuss advance care planning:  yes     Does patient have an Advance Directive:  yes   If no, did you provide information on Caring Connections? yes     Plan:      Orders Placed This Encounter    Depression Screen Annual    HEMOGLOBIN A1C WITH EAG    METABOLIC PANEL, COMPREHENSIVE    AMB POC URINE, MICROALBUMIN, SEMIQUANT (3 RESULTS)    Annual  Alcohol Screen 15 min ()     Health Maintenance   Topic Date Due    EYE EXAM RETINAL OR DILATED Q1  06/21/2017    MICROALBUMIN Q1  01/11/2018    MEDICARE YEARLY EXAM  04/13/2018    HEMOGLOBIN A1C Q6M  05/01/2018    GLAUCOMA SCREENING Q2Y  06/21/2018    Influenza Age 9 to Adult  08/01/2018    FOOT EXAM Q1  11/01/2018    LIPID PANEL Q1  11/01/2018    DTaP/Tdap/Td series (2 - Td) 11/01/2027    Bone Densitometry (Dexa) Screening  Completed    ZOSTER VACCINE AGE 60>  Completed    Pneumococcal 65+ Low/Medium Risk  Completed       *Patient verbalized understanding and agreement with the plan. A copy of the After Visit Summary with personalized health plan was given to the patient today.

## 2018-05-08 LAB
ALBUMIN SERPL-MCNC: 4.6 G/DL (ref 3.5–4.8)
ALBUMIN/GLOB SERPL: 2.1 {RATIO} (ref 1.2–2.2)
ALP SERPL-CCNC: 90 IU/L (ref 39–117)
ALT SERPL-CCNC: 15 IU/L (ref 0–32)
AST SERPL-CCNC: 13 IU/L (ref 0–40)
BILIRUB SERPL-MCNC: 0.3 MG/DL (ref 0–1.2)
BUN SERPL-MCNC: 15 MG/DL (ref 8–27)
BUN/CREAT SERPL: 22 (ref 12–28)
CALCIUM SERPL-MCNC: 10.4 MG/DL (ref 8.7–10.3)
CHLORIDE SERPL-SCNC: 100 MMOL/L (ref 96–106)
CO2 SERPL-SCNC: 27 MMOL/L (ref 18–29)
CREAT SERPL-MCNC: 0.69 MG/DL (ref 0.57–1)
EST. AVERAGE GLUCOSE BLD GHB EST-MCNC: 148 MG/DL
GFR SERPLBLD CREATININE-BSD FMLA CKD-EPI: 84 ML/MIN/1.73
GFR SERPLBLD CREATININE-BSD FMLA CKD-EPI: 96 ML/MIN/1.73
GLOBULIN SER CALC-MCNC: 2.2 G/DL (ref 1.5–4.5)
GLUCOSE SERPL-MCNC: 124 MG/DL (ref 65–99)
HBA1C MFR BLD: 6.8 % (ref 4.8–5.6)
POTASSIUM SERPL-SCNC: 4.6 MMOL/L (ref 3.5–5.2)
PROT SERPL-MCNC: 6.8 G/DL (ref 6–8.5)
SODIUM SERPL-SCNC: 143 MMOL/L (ref 134–144)

## 2019-01-23 DIAGNOSIS — E78.00 PURE HYPERCHOLESTEROLEMIA: ICD-10-CM

## 2019-01-23 DIAGNOSIS — I10 ESSENTIAL HYPERTENSION: ICD-10-CM

## 2019-01-23 RX ORDER — LOSARTAN POTASSIUM 100 MG/1
TABLET ORAL
Qty: 90 TAB | Refills: 3 | Status: SHIPPED | OUTPATIENT
Start: 2019-01-23 | End: 2020-02-06

## 2019-01-23 RX ORDER — ATORVASTATIN CALCIUM 20 MG/1
TABLET, FILM COATED ORAL
Qty: 90 TAB | Refills: 3 | Status: SHIPPED | OUTPATIENT
Start: 2019-01-23 | End: 2020-02-06

## 2021-01-25 DIAGNOSIS — E78.00 PURE HYPERCHOLESTEROLEMIA: ICD-10-CM

## 2021-01-26 RX ORDER — ATORVASTATIN CALCIUM 20 MG/1
TABLET, FILM COATED ORAL
Qty: 90 TAB | Refills: 2 | Status: SHIPPED | OUTPATIENT
Start: 2021-01-26 | End: 2021-09-24 | Stop reason: SDUPTHER

## 2021-05-12 ENCOUNTER — OFFICE VISIT (OUTPATIENT)
Dept: FAMILY MEDICINE CLINIC | Age: 81
End: 2021-05-12
Payer: MEDICARE

## 2021-05-12 VITALS
DIASTOLIC BLOOD PRESSURE: 66 MMHG | SYSTOLIC BLOOD PRESSURE: 116 MMHG | TEMPERATURE: 96.9 F | HEIGHT: 61 IN | WEIGHT: 173.6 LBS | RESPIRATION RATE: 16 BRPM | BODY MASS INDEX: 32.77 KG/M2 | OXYGEN SATURATION: 96 % | HEART RATE: 73 BPM

## 2021-05-12 DIAGNOSIS — E78.00 PURE HYPERCHOLESTEROLEMIA: ICD-10-CM

## 2021-05-12 DIAGNOSIS — I10 ESSENTIAL HYPERTENSION: ICD-10-CM

## 2021-05-12 DIAGNOSIS — E11.21 TYPE 2 DIABETES MELLITUS WITH DIABETIC NEPHROPATHY, WITHOUT LONG-TERM CURRENT USE OF INSULIN (HCC): Primary | ICD-10-CM

## 2021-05-12 PROCEDURE — G8510 SCR DEP NEG, NO PLAN REQD: HCPCS | Performed by: FAMILY MEDICINE

## 2021-05-12 PROCEDURE — 99214 OFFICE O/P EST MOD 30 MIN: CPT | Performed by: FAMILY MEDICINE

## 2021-05-12 PROCEDURE — G8752 SYS BP LESS 140: HCPCS | Performed by: FAMILY MEDICINE

## 2021-05-12 PROCEDURE — 1101F PT FALLS ASSESS-DOCD LE1/YR: CPT | Performed by: FAMILY MEDICINE

## 2021-05-12 PROCEDURE — G8427 DOCREV CUR MEDS BY ELIG CLIN: HCPCS | Performed by: FAMILY MEDICINE

## 2021-05-12 PROCEDURE — G8417 CALC BMI ABV UP PARAM F/U: HCPCS | Performed by: FAMILY MEDICINE

## 2021-05-12 PROCEDURE — 1090F PRES/ABSN URINE INCON ASSESS: CPT | Performed by: FAMILY MEDICINE

## 2021-05-12 PROCEDURE — G8399 PT W/DXA RESULTS DOCUMENT: HCPCS | Performed by: FAMILY MEDICINE

## 2021-05-12 PROCEDURE — G8536 NO DOC ELDER MAL SCRN: HCPCS | Performed by: FAMILY MEDICINE

## 2021-05-12 PROCEDURE — G8754 DIAS BP LESS 90: HCPCS | Performed by: FAMILY MEDICINE

## 2021-05-12 NOTE — PATIENT INSTRUCTIONS
A Healthy Lifestyle: Care Instructions Your Care Instructions A healthy lifestyle can help you feel good, stay at a healthy weight, and have plenty of energy for both work and play. A healthy lifestyle is something you can share with your whole family. A healthy lifestyle also can lower your risk for serious health problems, such as high blood pressure, heart disease, and diabetes. You can follow a few steps listed below to improve your health and the health of your family. Follow-up care is a key part of your treatment and safety. Be sure to make and go to all appointments, and call your doctor if you are having problems. It's also a good idea to know your test results and keep a list of the medicines you take. How can you care for yourself at home? · Do not eat too much sugar, fat, or fast foods. You can still have dessert and treats now and then. The goal is moderation. · Start small to improve your eating habits. Pay attention to portion sizes, drink less juice and soda pop, and eat more fruits and vegetables. ? Eat a healthy amount of food. A 3-ounce serving of meat, for example, is about the size of a deck of cards. Fill the rest of your plate with vegetables and whole grains. ? Limit the amount of soda and sports drinks you have every day. Drink more water when you are thirsty. ? Eat plenty of fruits and vegetables every day. Have an apple or some carrot sticks as an afternoon snack instead of a candy bar. Try to have fruits and/or vegetables at every meal. 
· Make exercise part of your daily routine. You may want to start with simple activities, such as walking, bicycling, or slow swimming. Try to be active 30 to 60 minutes every day. You do not need to do all 30 to 60 minutes all at once. For example, you can exercise 3 times a day for 10 or 20 minutes.  Moderate exercise is safe for most people, but it is always a good idea to talk to your doctor before starting an exercise program. 
· Keep moving. Alex Reynolds the lawn, work in the garden, or [employer]. Take the stairs instead of the elevator at work. · Limit how much alcohol you drink. Moderate amounts of alcohol (up to 2 drinks a day for men, 1 drink a day for women) are okay. But drinking too much can lead to liver problems, high blood pressure, and other health problems. Family health If you have a family, there are many things you can do together to improve your health. · Eat meals together as a family as often as possible. · Eat healthy foods. This includes fruits, vegetables, lean meats and dairy, and whole grains. · Include your family in your fitness plan. Most people think of activities such as jogging or tennis as the way to fitness, but there are many ways you and your family can be more active. Anything that makes you breathe hard and gets your heart pumping is exercise. Here are some tips: 
? Walk to do errands or to take your child to school or the bus. 
? Go for a family bike ride after dinner instead of watching TV. Where can you learn more? Go to http://www.gray.com/ Enter I667 in the search box to learn more about \"A Healthy Lifestyle: Care Instructions. \" Current as of: September 23, 2020               Content Version: 12.8 © 2006-2021 [employer]wise[employer]. Care instructions adapted under license by [employer] (which disclaims liability or warranty for this information). If you have questions about a medical condition or this instruction, always ask your healthcare professional. [place] [address] any warranty or liability for your use of this information.

## 2021-05-12 NOTE — PROGRESS NOTES
Subjective:   No chief complaint on file. HPI:  Diabetes. Sugars controlled well on last check on metformin and bydureon 1 inj/wk. Weight about the same. Hypoglycemia: none. Tolerating current treatment well. Current medications include metformin ER 2000mg/d and bydureon bcise weekly. Lab Results   Component Value Date/Time    Hemoglobin A1c 7.1 (H) 11/11/2020 12:03 PM    Hemoglobin A1c 7.5 (H) 05/21/2020 10:53 AM    Hemoglobin A1c 6.8 (H) 11/13/2019 11:15 AM    Glucose 146 (H) 11/11/2020 12:03 PM    Glucose (POC) 128 (H) 02/22/2017 01:22 PM    Microalb/Creat ratio (ug/mg creat.) 10 11/11/2020 12:03 PM    Microalbumin/creat ratio (POC) <30 05/07/2018 02:34 PM    LDL, calculated 52 11/11/2020 12:03 PM    LDL, calculated 68 11/13/2019 11:15 AM    Creatinine 0.65 11/11/2020 12:03 PM     Last eye exam performed last year with Dr. Suzy Gaffney [facility] in [address] [facility] [place], no DR. Pt plans to make recheck appt soon. Hypertension. Blood pressures in goal. Management at last visit included con't current tx. Current regimen: angiotensin II receptor antagonist. Symptoms include no symptoms. Patient denies chest pain, palpitations, peripheral edema. Lab review:   Lab Results   Component Value Date/Time    Sodium 141 11/11/2020 12:03 PM    Potassium 4.4 11/11/2020 12:03 PM    Chloride 101 11/11/2020 12:03 PM    CO2 28 11/11/2020 12:03 PM    Anion gap 10 02/14/2017 10:49 AM    Glucose 146 (H) 11/11/2020 12:03 PM    BUN 19 11/11/2020 12:03 PM    Creatinine 0.65 11/11/2020 12:03 PM    BUN/Creatinine ratio 29 (H) 11/11/2020 12:03 PM    GFR est AA 97 11/11/2020 12:03 PM    GFR est non-AA 84 11/11/2020 12:03 PM    Calcium 10.6 (H) 11/11/2020 12:03 PM     Hyperlipidemia. On lipitor 20. Sommer well. No myalgias, arthralgias, unusual weakness.   Lab Results   Component Value Date/Time    Cholesterol, total 142 11/11/2020 12:03 PM    HDL Cholesterol 55 11/11/2020 12:03 PM    LDL, calculated 52 11/11/2020 12:03 PM    LDL, calculated 68 11/13/2019 11:15 AM    VLDL, calculated 35 11/11/2020 12:03 PM    VLDL, calculated 52 (H) 11/13/2019 11:15 AM    Triglyceride 221 (H) 11/11/2020 12:03 PM     Lab Results   Component Value Date/Time    ALT (SGPT) 18 11/11/2020 12:03 PM    Alk. phosphatase 111 11/11/2020 12:03 PM    Bilirubin, total 0.3 11/11/2020 12:03 PM       PMH, SH, Medications/Allergies: reviewed, on chart. Current Outpatient Medications   Medication Sig    metFORMIN ER (GLUCOPHAGE XR) 500 mg tablet TAKE TWO TABLETS BY MOUTH TWO TIMES A DAY FOR TYPE 2 DIABETES MELLITUS    losartan (COZAAR) 100 mg tablet TAKE 1 TABLET DAILY FOR PRESSURE    atorvastatin (LIPITOR) 20 mg tablet TAKE 1 TABLET BY MOUTH DAILY TO PREVENT CARDIAC DISEASE    exenatide microspheres (Bydureon BCise) 2 mg/0.85 mL atIn INJECT 1 INJECTION WEEKLY FOR DIABETES    clotrimazole (LOTRIMIN) 1 % topical cream Apply  to affected area two (2) times a day.  AMOXICILLIN PO Take  by mouth. Takes 4 tabs prior to dental work     No current facility-administered medications for this visit.        Allergies   Allergen Reactions    Ace Inhibitors Unable to Obtain    Antihist [Diphenhydramine Hcl] Hives    Diphenhydramine Hives    Zithromax [Azithromycin] Itching       ROS:  Constitutional: No fever, chills or abnormal weight loss  Respiratory: No cough, SOB   CV: No chest pain or Palpitations    Visit Vitals  /66 (BP 1 Location: Left arm)   Pulse 73   Temp 96.9 °F (36.1 °C) (Temporal)   Resp 16   Ht 5' 1\" (1.549 m)   Wt 173 lb 9.6 oz (78.7 kg)   SpO2 96%   BMI 32.80 kg/m²     -2#  Wt Readings from Last 3 Encounters:   05/12/21 173 lb 9.6 oz (78.7 kg)   11/11/20 175 lb 9.6 oz (79.7 kg)   11/13/19 171 lb 9.6 oz (77.8 kg)     BP Readings from Last 3 Encounters:   05/12/21 116/66   11/11/20 106/60   05/13/20 124/70       Objective:     Physical Examination: General appearance - alert, well appearing, and in no distress  Mental status - alert, oriented to person, place, and time  Eyes - pupils equal and reactive, extraocular eye movements intact  ENT - bilateral external ears and nose normal. Normal lips  Neck - supple, no significant adenopathy, no thyromegaly or mass  Lymphatics - no palpable lymphadenopathy, no hepatosplenomegaly  Chest - clear to auscultation, no wheezes, rales or rhonchi, symmetric air entry  Heart - normal rate, regular rhythm, normal S1, S2, no murmurs, rubs, clicks or gallops  Extremities - peripheral pulses normal, no pedal edema, no clubbing or cyanosis  Neuro- Mini-cog: nl Clock, 3/3 recall       Assessment & Plan:     DM2  Doing well on bydureon 1 inj/week and metformin, yulissa well. Check a1c. If A1c still in the 6's, plan check q6mo. HTN  Good today. Con't current tx. recheck BMP     HLD  Well controlled. Plan recheck labs fall, adjust PRN, con't current tx if in goal.    Adenomatous polyps  Mild, had a couple more tubular adenomas on colo 2020 with Areli. Told ok to monitor clinically for now. Got reports from [employer]:\"07/23/2020 2:13 PM EDT Patient called back results given she voiced understanding. 1 benign polyp removed. No repeat colonoscopy indicated due to age. \"    Wants results by letter. F/U 6mo/PRN.

## 2021-05-13 LAB
ANION GAP SERPL CALC-SCNC: 7 MMOL/L (ref 5–15)
BUN SERPL-MCNC: 19 MG/DL (ref 6–20)
BUN/CREAT SERPL: 26 (ref 12–20)
CALCIUM SERPL-MCNC: 10.2 MG/DL (ref 8.5–10.1)
CHLORIDE SERPL-SCNC: 107 MMOL/L (ref 97–108)
CO2 SERPL-SCNC: 28 MMOL/L (ref 21–32)
CREAT SERPL-MCNC: 0.73 MG/DL (ref 0.55–1.02)
EST. AVERAGE GLUCOSE BLD GHB EST-MCNC: 160 MG/DL
GLUCOSE SERPL-MCNC: 188 MG/DL (ref 65–100)
HBA1C MFR BLD: 7.2 % (ref 4–5.6)
POTASSIUM SERPL-SCNC: 4.6 MMOL/L (ref 3.5–5.1)
SODIUM SERPL-SCNC: 142 MMOL/L (ref 136–145)

## 2021-06-29 ENCOUNTER — TELEPHONE (OUTPATIENT)
Dept: FAMILY MEDICINE CLINIC | Age: 81
End: 2021-06-29

## 2021-09-24 ENCOUNTER — OFFICE VISIT (OUTPATIENT)
Dept: FAMILY MEDICINE CLINIC | Age: 81
End: 2021-09-24
Payer: MEDICARE

## 2021-09-24 VITALS
DIASTOLIC BLOOD PRESSURE: 60 MMHG | WEIGHT: 172.25 LBS | SYSTOLIC BLOOD PRESSURE: 140 MMHG | HEART RATE: 78 BPM | HEIGHT: 62 IN | OXYGEN SATURATION: 99 % | BODY MASS INDEX: 31.7 KG/M2 | RESPIRATION RATE: 20 BRPM | TEMPERATURE: 97.4 F

## 2021-09-24 DIAGNOSIS — E11.21 TYPE 2 DIABETES MELLITUS WITH DIABETIC NEPHROPATHY, WITHOUT LONG-TERM CURRENT USE OF INSULIN (HCC): Primary | ICD-10-CM

## 2021-09-24 DIAGNOSIS — H90.0 CONDUCTIVE HEARING LOSS, BILATERAL: ICD-10-CM

## 2021-09-24 DIAGNOSIS — Z23 ENCOUNTER FOR IMMUNIZATION: ICD-10-CM

## 2021-09-24 DIAGNOSIS — D12.6 TUBULAR ADENOMA OF COLON: ICD-10-CM

## 2021-09-24 DIAGNOSIS — H61.23 EXCESSIVE CERUMEN IN BOTH EAR CANALS: ICD-10-CM

## 2021-09-24 DIAGNOSIS — E78.00 PURE HYPERCHOLESTEROLEMIA: ICD-10-CM

## 2021-09-24 DIAGNOSIS — I10 ESSENTIAL HYPERTENSION: ICD-10-CM

## 2021-09-24 DIAGNOSIS — L20.82 FLEXURAL ECZEMA: ICD-10-CM

## 2021-09-24 PROCEDURE — 90694 VACC AIIV4 NO PRSRV 0.5ML IM: CPT | Performed by: FAMILY MEDICINE

## 2021-09-24 PROCEDURE — 69210 REMOVE IMPACTED EAR WAX UNI: CPT | Performed by: FAMILY MEDICINE

## 2021-09-24 PROCEDURE — G0008 ADMIN INFLUENZA VIRUS VAC: HCPCS | Performed by: FAMILY MEDICINE

## 2021-09-24 PROCEDURE — 99214 OFFICE O/P EST MOD 30 MIN: CPT | Performed by: FAMILY MEDICINE

## 2021-09-24 RX ORDER — MOMETASONE FUROATE 1 MG/G
CREAM TOPICAL
Qty: 45 G | Refills: 3 | Status: SHIPPED | OUTPATIENT
Start: 2021-09-24 | End: 2022-06-03 | Stop reason: SDUPTHER

## 2021-09-24 RX ORDER — ATORVASTATIN CALCIUM 20 MG/1
TABLET, FILM COATED ORAL
Qty: 90 TABLET | Refills: 3 | Status: SHIPPED | OUTPATIENT
Start: 2021-09-24 | End: 2022-02-22

## 2021-09-24 NOTE — PATIENT INSTRUCTIONS

## 2021-09-24 NOTE — PROGRESS NOTES
Subjective:   Rash    HPI:  Rash  Has had itchy red bumps to the volar forearms and bilat anterior shins for past month. Gradually worsening. Tried some topical hydrocortisone 1% OTC cream, not too helpful. Now having hard time sleeping at night due to itching. Diabetes. Sugars controlled well on last check on metformin and bydureon 1 inj/wk. Weight about the same. Hypoglycemia: none. Tolerating current treatment well. Current medications include metformin ER 2000mg/d and bydureon bcise weekly. Lab Results   Component Value Date/Time    Hemoglobin A1c 7.2 (H) 05/12/2021 11:51 AM    Hemoglobin A1c 7.1 (H) 11/11/2020 12:03 PM    Hemoglobin A1c 7.5 (H) 05/21/2020 10:53 AM    Glucose 188 (H) 05/12/2021 11:51 AM    Glucose (POC) 128 (H) 02/22/2017 01:22 PM    Microalb/Creat ratio (ug/mg creat.) 10 11/11/2020 12:03 PM    Microalbumin/creat ratio (POC) <30 05/07/2018 02:34 PM    LDL, calculated 52 11/11/2020 12:03 PM    LDL, calculated 68 11/13/2019 11:15 AM    Creatinine 0.73 05/12/2021 11:51 AM     Last eye exam performed last year with Dr. Sam [facility] in [facility], no DR. Pt plans to make recheck appt soon. Hypertension. Blood pressures in goal. Management at last visit included con't current tx. Current regimen: angiotensin II receptor antagonist. Symptoms include no symptoms. Patient denies chest pain, palpitations, peripheral edema. Lab review:   Lab Results   Component Value Date/Time    Sodium 142 05/12/2021 11:51 AM    Potassium 4.6 05/12/2021 11:51 AM    Chloride 107 05/12/2021 11:51 AM    CO2 28 05/12/2021 11:51 AM    Anion gap 7 05/12/2021 11:51 AM    Glucose 188 (H) 05/12/2021 11:51 AM    BUN 19 05/12/2021 11:51 AM    Creatinine 0.73 05/12/2021 11:51 AM    BUN/Creatinine ratio 26 (H) 05/12/2021 11:51 AM    GFR est AA >60 05/12/2021 11:51 AM    GFR est non-AA >60 05/12/2021 11:51 AM    Calcium 10.2 (H) 05/12/2021 11:51 AM     Hyperlipidemia. On lipitor 20. Sommer well.  No myalgias, arthralgias, unusual weakness. Lab Results   Component Value Date/Time    Cholesterol, total 142 11/11/2020 12:03 PM    HDL Cholesterol 55 11/11/2020 12:03 PM    LDL, calculated 52 11/11/2020 12:03 PM    LDL, calculated 68 11/13/2019 11:15 AM    VLDL, calculated 35 11/11/2020 12:03 PM    VLDL, calculated 52 (H) 11/13/2019 11:15 AM    Triglyceride 221 (H) 11/11/2020 12:03 PM     Lab Results   Component Value Date/Time    ALT (SGPT) 18 11/11/2020 12:03 PM    Alk. phosphatase 111 11/11/2020 12:03 PM    Bilirubin, total 0.3 11/11/2020 12:03 PM     Hearing loss  PT reports worsening auditory acuity. Told by her audiologist that she needed her earwax removed. PMH, SH, Medications/Allergies: reviewed, on chart. Current Outpatient Medications   Medication Sig    metFORMIN ER (GLUCOPHAGE XR) 500 mg tablet TAKE TWO TABLETS BY MOUTH TWO TIMES A DAY FOR TYPE 2 DIABETES MELLITUS    losartan (COZAAR) 100 mg tablet TAKE 1 TABLET DAILY FOR PRESSURE    atorvastatin (LIPITOR) 20 mg tablet TAKE 1 TABLET BY MOUTH DAILY TO PREVENT CARDIAC DISEASE    exenatide microspheres (Bydureon BCise) 2 mg/0.85 mL atIn INJECT 1 INJECTION WEEKLY FOR DIABETES    clotrimazole (LOTRIMIN) 1 % topical cream Apply  to affected area two (2) times a day.  AMOXICILLIN PO Take  by mouth. Takes 4 tabs prior to dental work     No current facility-administered medications for this visit.       Allergies   Allergen Reactions    Ace Inhibitors Unable to Obtain    Antihist [Diphenhydramine Hcl] Hives    Diphenhydramine Hives    Zithromax [Azithromycin] Itching       ROS:  Constitutional: No fever, chills or abnormal weight loss  Respiratory: No cough, SOB   CV: No chest pain or Palpitations    Visit Vitals  BP (!) 140/60   Pulse 78   Temp 97.4 °F (36.3 °C) (Temporal)   Resp 20   Ht 5' 2\" (1.575 m)   Wt 172 lb 4 oz (78.1 kg)   SpO2 99%   BMI 31.50 kg/m²     -1#  Wt Readings from Last 3 Encounters:   09/24/21 172 lb 4 oz (78.1 kg)   05/12/21 173 lb 9.6 oz (78.7 kg)   11/11/20 175 lb 9.6 oz (79.7 kg)     BP Readings from Last 3 Encounters:   09/24/21 (!) 140/60   05/12/21 116/66   11/11/20 106/60       Objective:     Physical Examination: General appearance - alert, well appearing, and in no distress  Mental status - alert, oriented to person, place, and time  Eyes - pupils equal and reactive, extraocular eye movements intact  ENT - bilateral external ears with heavy cerumen impaction. Ext nose normal. Normal lips  Neck - supple, no significant adenopathy, no thyromegaly or mass  Lymphatics - no palpable lymphadenopathy, no hepatosplenomegaly  Chest - clear to auscultation, no wheezes, rales or rhonchi, symmetric air entry  Heart - normal rate, regular rhythm, normal S1, S2, no murmurs, rubs, clicks or gallops  Extremities - peripheral pulses normal, no pedal edema, no clubbing or cyanosis  Skin- mult erythematous papules to volar wrists, anterior shins    Assessment & Plan:     Eczema  Mild. tx with elocon 0.1% cr AAA BID PRN itching. DM2  Doing well on bydureon 1 inj/week and metformin, yulissa well. Check a1c, monster/cr, CMP. If A1c still in the 6's, plan check q6mo. HTN  A little up today. BP checks. Con't current tx for now and recheck lytes, GFR in CMP     HLD  Well controlled. Plan recheck labs, adjust PRN, con't current tx if in goal.    Flu shot today, HD. Wants results by letter. F/U 6mo/PRN. 1. Have you been to the ER, urgent care clinic since your last visit? Hospitalized since your last visit? No    2. Have you seen or consulted any other health care providers outside of the [address] [place] [address] since your last visit? Include any pap smears or colon screening.  No       Chart reviewed for the following:   Stephany Hodgkins, MD, have reviewed the History, Physical and updated the Allergic reactions for [address] [place] [address] performed immediately prior to start of procedure:   Stephany Hodgkins, MD, have performed the following reviews on [employer] prior to the start of the procedure:            * Patient was identified by name and date of birth   * Agreement on procedure being performed was verified  * Risks and Benefits explained to the patient  * Procedure site verified and marked as necessary  * Patient was positioned for comfort  * Consent was verified  * Pain level 0 pre-procedure. 3:15 PM    Procedure: Cerumen removal.  Indication: hearing loss and ear pain. Details: using the ear curette, under direct visualization a ball of cerumen was removed from the ear canal. Hearing improved immediately. Pt tolerated well, no complications.

## 2021-09-24 NOTE — PROGRESS NOTES
After obtaining consent, and per orders of Dr. Christine Kerns, injection of Fluad given by Roseanne Jasmine LPN. Patient instructed to remain in clinic for 20 minutes afterwards, and to report any adverse reaction to me immediately.

## 2021-09-25 LAB
ALBUMIN SERPL-MCNC: 3.6 G/DL (ref 3.5–5)
ALBUMIN/GLOB SERPL: 1.2 {RATIO} (ref 1.1–2.2)
ALP SERPL-CCNC: 103 U/L (ref 45–117)
ALT SERPL-CCNC: 30 U/L (ref 12–78)
ANION GAP SERPL CALC-SCNC: 6 MMOL/L (ref 5–15)
AST SERPL-CCNC: 13 U/L (ref 15–37)
BILIRUB SERPL-MCNC: 0.3 MG/DL (ref 0.2–1)
BUN SERPL-MCNC: 18 MG/DL (ref 6–20)
BUN/CREAT SERPL: 22 (ref 12–20)
CALCIUM SERPL-MCNC: 10.2 MG/DL (ref 8.5–10.1)
CHLORIDE SERPL-SCNC: 108 MMOL/L (ref 97–108)
CHOLEST SERPL-MCNC: 146 MG/DL
CO2 SERPL-SCNC: 28 MMOL/L (ref 21–32)
CREAT SERPL-MCNC: 0.83 MG/DL (ref 0.55–1.02)
CREAT UR-MCNC: 144 MG/DL
EST. AVERAGE GLUCOSE BLD GHB EST-MCNC: 163 MG/DL
GLOBULIN SER CALC-MCNC: 3.1 G/DL (ref 2–4)
GLUCOSE SERPL-MCNC: 164 MG/DL (ref 65–100)
HBA1C MFR BLD: 7.3 % (ref 4–5.6)
HDLC SERPL-MCNC: 56 MG/DL
HDLC SERPL: 2.6 {RATIO} (ref 0–5)
LDLC SERPL CALC-MCNC: 26.8 MG/DL (ref 0–100)
MICROALBUMIN UR-MCNC: 3.77 MG/DL
MICROALBUMIN/CREAT UR-RTO: 26 MG/G (ref 0–30)
POTASSIUM SERPL-SCNC: 4.4 MMOL/L (ref 3.5–5.1)
PROT SERPL-MCNC: 6.7 G/DL (ref 6.4–8.2)
SODIUM SERPL-SCNC: 142 MMOL/L (ref 136–145)
TRIGL SERPL-MCNC: 316 MG/DL (ref ?–150)
VLDLC SERPL CALC-MCNC: 63.2 MG/DL

## 2021-10-01 ENCOUNTER — OFFICE VISIT (OUTPATIENT)
Dept: FAMILY MEDICINE CLINIC | Age: 81
End: 2021-10-01
Payer: MEDICARE

## 2021-10-01 VITALS
BODY MASS INDEX: 31.56 KG/M2 | HEART RATE: 79 BPM | SYSTOLIC BLOOD PRESSURE: 120 MMHG | DIASTOLIC BLOOD PRESSURE: 76 MMHG | WEIGHT: 171.5 LBS | OXYGEN SATURATION: 97 % | RESPIRATION RATE: 20 BRPM | TEMPERATURE: 97.3 F | HEIGHT: 62 IN

## 2021-10-01 DIAGNOSIS — L20.82 FLEXURAL ECZEMA: Primary | ICD-10-CM

## 2021-10-01 DIAGNOSIS — E11.21 TYPE 2 DIABETES MELLITUS WITH DIABETIC NEPHROPATHY, WITHOUT LONG-TERM CURRENT USE OF INSULIN (HCC): ICD-10-CM

## 2021-10-01 PROCEDURE — 99213 OFFICE O/P EST LOW 20 MIN: CPT | Performed by: FAMILY MEDICINE

## 2021-10-01 RX ORDER — PREDNISONE 20 MG/1
TABLET ORAL
Qty: 24 TABLET | Refills: 0 | Status: SHIPPED | OUTPATIENT
Start: 2021-10-01 | End: 2022-06-03 | Stop reason: SDUPTHER

## 2021-10-01 RX ORDER — GLIMEPIRIDE 1 MG/1
1 TABLET ORAL
Qty: 30 TABLET | Refills: 0 | Status: SHIPPED | OUTPATIENT
Start: 2021-10-01 | End: 2022-06-03 | Stop reason: SDUPTHER

## 2021-10-01 NOTE — PROGRESS NOTES
Visit Vitals  /76   Pulse 79   Temp 97.3 °F (36.3 °C)   Resp 20   Ht 5' 2\" (1.575 m)   Wt 171 lb 8 oz (77.8 kg)   SpO2 97%   BMI 31.37 kg/m²     Chief Complaint   Patient presents with    Rash     1. Have you been to the ER, urgent care clinic since your last visit? Hospitalized since your last visit? No    2. Have you seen or consulted any other health care providers outside of the [address] [place] [address] since your last visit? Include any pap smears or colon screening.  No

## 2022-03-09 ENCOUNTER — TELEPHONE (OUTPATIENT)
Dept: FAMILY MEDICINE CLINIC | Age: 82
End: 2022-03-09

## 2022-03-09 ENCOUNTER — CLINICAL SUPPORT (OUTPATIENT)
Dept: FAMILY MEDICINE CLINIC | Age: 82
End: 2022-03-09

## 2022-03-09 DIAGNOSIS — G47.33 OSA ON CPAP: ICD-10-CM

## 2022-03-09 DIAGNOSIS — E11.21 TYPE 2 DIABETES MELLITUS WITH DIABETIC NEPHROPATHY, WITHOUT LONG-TERM CURRENT USE OF INSULIN (HCC): ICD-10-CM

## 2022-03-09 DIAGNOSIS — E11.21 TYPE 2 DIABETES MELLITUS WITH DIABETIC NEPHROPATHY, WITHOUT LONG-TERM CURRENT USE OF INSULIN (HCC): Primary | ICD-10-CM

## 2022-03-09 DIAGNOSIS — Z99.89 OSA ON CPAP: ICD-10-CM

## 2022-03-09 NOTE — TELEPHONE ENCOUNTER
Pt swapped appts with spouse today for a more urgent concern. Does need f/u labs for DM ore, will ore those. Also needs new sleep consult for new CPAP.  [address]

## 2022-03-10 LAB
ANION GAP SERPL CALC-SCNC: 3 MMOL/L (ref 5–15)
BUN SERPL-MCNC: 16 MG/DL (ref 6–20)
BUN/CREAT SERPL: 25 (ref 12–20)
CALCIUM SERPL-MCNC: 10 MG/DL (ref 8.5–10.1)
CHLORIDE SERPL-SCNC: 105 MMOL/L (ref 97–108)
CO2 SERPL-SCNC: 31 MMOL/L (ref 21–32)
CREAT SERPL-MCNC: 0.63 MG/DL (ref 0.55–1.02)
EST. AVERAGE GLUCOSE BLD GHB EST-MCNC: 166 MG/DL
GLUCOSE SERPL-MCNC: 199 MG/DL (ref 65–100)
HBA1C MFR BLD: 7.4 % (ref 4–5.6)
POTASSIUM SERPL-SCNC: 4.5 MMOL/L (ref 3.5–5.1)
SODIUM SERPL-SCNC: 139 MMOL/L (ref 136–145)

## 2022-03-19 PROBLEM — D12.6 TUBULAR ADENOMA OF COLON: Status: ACTIVE | Noted: 2018-05-07

## 2022-06-03 ENCOUNTER — OFFICE VISIT (OUTPATIENT)
Dept: FAMILY MEDICINE CLINIC | Age: 82
End: 2022-06-03
Payer: MEDICARE

## 2022-06-03 VITALS
HEART RATE: 73 BPM | SYSTOLIC BLOOD PRESSURE: 122 MMHG | RESPIRATION RATE: 20 BRPM | HEIGHT: 62 IN | OXYGEN SATURATION: 97 % | WEIGHT: 165 LBS | TEMPERATURE: 97.1 F | BODY MASS INDEX: 30.36 KG/M2 | DIASTOLIC BLOOD PRESSURE: 60 MMHG

## 2022-06-03 DIAGNOSIS — E11.21 TYPE 2 DIABETES MELLITUS WITH DIABETIC NEPHROPATHY, WITHOUT LONG-TERM CURRENT USE OF INSULIN (HCC): ICD-10-CM

## 2022-06-03 DIAGNOSIS — L20.82 FLEXURAL ECZEMA: ICD-10-CM

## 2022-06-03 DIAGNOSIS — L24.7 IRRITANT CONTACT DERMATITIS DUE TO PLANTS, EXCEPT FOOD: Primary | ICD-10-CM

## 2022-06-03 PROCEDURE — 99213 OFFICE O/P EST LOW 20 MIN: CPT | Performed by: FAMILY MEDICINE

## 2022-06-03 RX ORDER — MOMETASONE FUROATE 1 MG/G
CREAM TOPICAL
Qty: 45 G | Refills: 3 | Status: SHIPPED | OUTPATIENT
Start: 2022-06-03

## 2022-06-03 RX ORDER — PREDNISONE 20 MG/1
TABLET ORAL
Qty: 24 TABLET | Refills: 0 | Status: SHIPPED | OUTPATIENT
Start: 2022-06-03

## 2022-06-03 RX ORDER — GLIMEPIRIDE 1 MG/1
1 TABLET ORAL
Qty: 30 TABLET | Refills: 0 | Status: SHIPPED | OUTPATIENT
Start: 2022-06-03

## 2022-06-03 NOTE — PROGRESS NOTES
Dania Cedeno is a 80 y.o. female presenting for/with:    Chief Complaint   Patient presents with    Poison Ivy/Poison Oak/Poison [place] Exposure     right arm, right side, started tuesday       Visit Vitals  /60   Pulse 73   Temp 97.1 °F (36.2 °C) (Temporal)   Resp 20   Ht 5' 2\" (1.575 m)   Wt 165 lb (74.8 kg)   SpO2 97%   BMI 30.18 kg/m²     Pain Scale: /10  Pain Location:     1. \"Have you been to the ER, urgent care clinic since your last visit? Hospitalized since your last visit? \" No    2. \"Have you seen or consulted any other health care providers outside of the [address] [place] [address] since your last visit? \" No     3. For patients aged 39-70: Has the patient had a colonoscopy / FIT/ Cologuard? NA - based on age      If the patient is female:    4. For patients aged 41-77: Has the patient had a mammogram within the past 2 years? NA - based on age or sex      11. For patients aged 21-65: Has the patient had a pap smear?  NA - based on age or sex      Symptom review:  NO  Fever   NO  Shaking chills  NO  Cough  NO  Body aches  NO  Coughing up blood  NO  Chest congestion  NO  Chest pain  NO  Shortness of breath  NO  Profound Loss of smell/taste  NO  Nausea/Vomiting   NO  Loose stool/Diarrhea  YES  any skin issues    Patient Risk Factors Reviewed as follows:  NO  have you been in Close contact with confirmed COVID19 patient   NO  History of recent travel to affected geographical areas within the past 14 days  NO  COPD  NO  Active Cancer/Leukemia/Lymphoma/Chemotherapy  NO  Oral steroid use  NO  Pregnant  NO  Diabetes Mellitus  NO  Heart disease  NO  Asthma  NO Health care worker at home  [address] care worker  NO Is there a Pregnant Woman in the home  NO Dialysis pt in the home   NO a large number of people living in the home    Learning Assessment 5/12/2021   PRIMARY LEARNER Patient   PRIMARY LANGUAGE ENGLISH   LEARNER PREFERENCE PRIMARY READING   ANSWERED BY patient   RELATIONSHIP SELF     Fall Risk Assessment, last 12 mths 6/3/2022   Able to walk? Yes   Fall in past 12 months? 0   Do you feel unsteady? 0   Are you worried about falling 0   Number of falls in past 12 months -   Fall with injury? -       3 most recent PHQ Screens 6/3/2022   PHQ Not Done -   Little interest or pleasure in doing things Not at all   Feeling down, depressed, irritable, or hopeless Not at all   Total Score PHQ 2 0     Abuse Screening Questionnaire 6/3/2022   Do you ever feel afraid of your partner? N   Are you in a relationship with someone who physically or mentally threatens you? N   Is it safe for you to go home?  Y       ADL Assessment 6/3/2022   Feeding yourself No Help Needed   Getting from bed to chair No Help Needed   Getting dressed No Help Needed   Bathing or showering No Help Needed   Walk across the room (includes cane/walker) No Help Needed   Using the telphone No Help Needed   Taking your medications No Help Needed   Preparing meals No Help Needed   Managing money (expenses/bills) No Help Needed   Moderately strenuous housework (laundry) No Help Needed   Shopping for personal items (toiletries/medicines) No Help Needed   Shopping for groceries No Help Needed   Driving No Help Needed   Climbing a flight of stairs No Help Needed   Getting to places beyond walking distances No Help Needed      Advance Care Planning 6/3/2022   Patient's Healthcare Decision Maker is: Named in scanned ACP document   Primary Decision Maker Name -   Primary Decision Maker Phone Number -   Primary Decision Maker Relationship to Patient -   Confirm Advance Directive Yes, on file   Does the patient have other document types -

## 2022-06-03 NOTE — PATIENT INSTRUCTIONS
Poison [place], [place], and [place]: Care Instructions  Overview     Poison ivy, poison oak, and poison sumac are plants that can cause a skin rash upon contact. The red, itchy rash often shows up in lines or streaks. It may cause fluid-filled blisters or large, raised hives. The rash is caused by an allergic reaction to an oil in these plants. The rash may occur when you touch the plant or when you touch objects that have come in contact with these plants. Common examples include clothing, pet fur, sporting gear, or gardening tools. You can't catch or spread the rash by touching the rash or the blister fluid. The plant oil will already have been absorbed or washed off the skin. The rash may seem to be spreading because it's still developing from earlier contact or because you have touched something that still has the plant oil on it. Follow-up care is a key part of your treatment and safety. Be sure to make and go to all appointments, and call your doctor if you are having problems. It's also a good idea to know your test results and keep a list of the medicines you take. How can you care for yourself at home? · If your doctor prescribed a cream, use it as directed. If your doctor prescribed medicine, take it exactly as prescribed. Call your doctor if you think you are having a problem with your medicine. · Use cold, wet cloths to reduce itching. · Take warm or cool baths with oatmeal bath products, such as Aveeno. · Keep cool, and stay out of the sun. · Leave the rash open to the air. · Wash all clothing or other things that may have come in contact with the plant oil. · Avoid most lotions and ointments until the rash heals. Calamine lotion may help relieve symptoms of a plant rash. Use it 3 or 4 times a day. To prevent exposure  If you know you will be working around poison ivy, oak, or sumac:  · Use a cream or lotion to help prevent the plant oil from getting on your skin.  These products are available over the counter. ? Apply the product less than 1 hour before contact with the plant, in a thick, complete layer. ? Wash it off thoroughly within 4 hours or as soon as possible after contact with plants. The product only delays the oil from getting into your skin. · Be sure to wash your hands before and after you use the restroom. When should you call for help? Call your doctor now or seek immediate medical care if:    · Your rash gets worse, and you start to feel bad and have a fever, a stiff neck, nausea, and vomiting.     · You have signs of infection, such as:  ? Increased pain, swelling, warmth, or redness. ? Red streaks leading from the rash. ? Pus draining from the rash. ? A fever. Watch closely for changes in your health, and be sure to contact your doctor if:    · You have new blisters or bruises, or the rash spreads and looks like a sunburn.     · The rash gets worse, or it comes back after nearly disappearing.     · You think a medicine you are using is making your rash worse.     · Your rash does not clear up after 1 to 2 weeks of home treatment.     · You have joint aches or body aches with your rash. Where can you learn more? Go to http://www.gray.com/  Enter P778 in the search box to learn more about \"Poison [place], [place], and [place]: Care Instructions. \"  Current as of: November 15, 2021               Content Version: 13.2  © 7609-3513 [employer]. Care instructions adapted under license by [employer] (which disclaims liability or warranty for this information). If you have questions about a medical condition or this instruction, always ask your healthcare professional. [place] [address] any warranty or liability for your use of this information.

## 2022-06-03 NOTE — PROGRESS NOTES
Subjective:   Poison Ivy/Poison Oak/Poison [place] Exposure (right arm, right side, started tuesday)    HPI:  Rash  Has had itchy red bumps to the R upper forearm and R lateral breast for week. Gradually worsening. Last fall had similar sx, we tried tried topical CS cream, but didn't get good control, so we used prednisone taper. Diabetes. Sugars controlled well on last check on metformin and bydureon 1 inj/wk. Weight about the same. Hypoglycemia: none. Tolerating current treatment well. Current medications include metformin ER 2000mg/d and bydureon bcise weekly. Lab Results   Component Value Date/Time    Hemoglobin A1c 7.4 (H) 03/09/2022 11:45 AM    Hemoglobin A1c 7.3 (H) 09/24/2021 03:30 PM    Hemoglobin A1c 7.2 (H) 05/12/2021 11:51 AM    Glucose 199 (H) 03/09/2022 11:45 AM    Glucose (POC) 128 (H) 02/22/2017 01:22 PM    Microalbumin/Creat ratio (mg/g creat) 26 09/24/2021 03:30 PM    Microalbumin,urine random 3.77 09/24/2021 03:30 PM    Microalbumin/creat ratio (POC) <30 05/07/2018 02:34 PM    LDL, calculated 26.8 09/24/2021 03:30 PM    Creatinine 0.63 03/09/2022 11:45 AM     Last eye exam performed last year with Dr. Saundra Childs [facility] in [address], no DR. Pt plans to make recheck appt soon. PMH, SH, Medications/Allergies: reviewed, on chart. Current Outpatient Medications   Medication Sig    metFORMIN ER (GLUCOPHAGE XR) 500 mg tablet TAKE TWO TABLETS BY MOUTH TWO TIMES A DAY FOR TYPE 2 DIABETES MELLITUS    atorvastatin (LIPITOR) 20 mg tablet TAKE 1 TABLET BY MOUTH DAILY TO PREVENT CARDIAC DISEASE    losartan (COZAAR) 100 mg tablet TAKE 1 TABLET DAILY FOR PRESSURE    Bydureon BCise 2 mg/0.85 mL atIn INJECT 1 INJECTION WEEKLY FOR DIABETES    predniSONE (DELTASONE) 20 mg tablet 3 po qdx4 d, then 2 po qdx4 d then 1 po qdx4 for rash.  glimepiride (AMARYL) 1 mg tablet Take 1 Tablet by mouth daily as needed (sugar >200).     mometasone (ELOCON) 0.1 % topical cream AAA BID prn itching bumps    clotrimazole (LOTRIMIN) 1 % topical cream Apply  to affected area two (2) times a day.  AMOXICILLIN PO Take  by mouth. Takes 4 tabs prior to dental work     No current facility-administered medications for this visit. Allergies   Allergen Reactions    Ace Inhibitors Unable to Obtain    Antihist [Diphenhydramine Hcl] Hives    Diphenhydramine Hives    Zithromax [Azithromycin] Itching       ROS:  Constitutional: No fever, chills or abnormal weight loss  Respiratory: No cough, SOB   CV: No chest pain or Palpitations    Visit Vitals  /60   Pulse 73   Temp 97.1 °F (36.2 °C) (Temporal)   Resp 20   Ht 5' 2\" (1.575 m)   Wt 165 lb (74.8 kg)   SpO2 97%   BMI 30.18 kg/m²     -6#  Wt Readings from Last 3 Encounters:   06/03/22 165 lb (74.8 kg)   10/01/21 171 lb 8 oz (77.8 kg)   09/24/21 172 lb 4 oz (78.1 kg)     BP Readings from Last 3 Encounters:   06/03/22 122/60   10/01/21 120/76   09/24/21 (!) 140/60       Objective:     Physical Examination: General appearance - alert, well appearing, and in no distress  Mental status - alert, oriented to person, place, and time  Eyes - pupils equal and reactive, extraocular eye movements intact  ENT - bilateral external ears with heavy cerumen impaction. Ext nose normal. Normal lips  Neck - supple, no significant adenopathy, no thyromegaly or mass  Lymphatics - no palpable lymphadenopathy, no hepatosplenomegaly  Chest - clear to auscultation, no wheezes, rales or rhonchi, symmetric air entry  Heart - normal rate, regular rhythm, normal S1, S2, no murmurs, rubs, clicks or gallops  Extremities - peripheral pulses normal, no pedal edema, no clubbing or cyanosis  Skin- mult erythematous papules to R upper arm and R lateral breast with \"kissing lesions\" to opposing skin areas. Assessment & Plan:     Eczema  Mild. tx with elocon 0.1% cr AAA BID PRN itching. DM2  W/c but anticipate hyperglycemia with prednisone. RF amaryl 1mg daily prn sugar >200.  F/U for regular visit next week. F/U 1wk/PRN.

## 2022-06-10 ENCOUNTER — OFFICE VISIT (OUTPATIENT)
Dept: FAMILY MEDICINE CLINIC | Age: 82
End: 2022-06-10
Payer: MEDICARE

## 2022-06-10 VITALS
DIASTOLIC BLOOD PRESSURE: 58 MMHG | SYSTOLIC BLOOD PRESSURE: 122 MMHG | HEIGHT: 62 IN | WEIGHT: 165.8 LBS | BODY MASS INDEX: 30.51 KG/M2 | OXYGEN SATURATION: 97 % | TEMPERATURE: 97.1 F | HEART RATE: 75 BPM | RESPIRATION RATE: 22 BRPM

## 2022-06-10 DIAGNOSIS — I10 ESSENTIAL HYPERTENSION: ICD-10-CM

## 2022-06-10 DIAGNOSIS — Z13.31 SCREENING FOR DEPRESSION: ICD-10-CM

## 2022-06-10 DIAGNOSIS — E11.21 TYPE 2 DIABETES MELLITUS WITH DIABETIC NEPHROPATHY, WITHOUT LONG-TERM CURRENT USE OF INSULIN (HCC): ICD-10-CM

## 2022-06-10 DIAGNOSIS — E78.00 PURE HYPERCHOLESTEROLEMIA: ICD-10-CM

## 2022-06-10 DIAGNOSIS — Z00.00 MEDICARE ANNUAL WELLNESS VISIT, SUBSEQUENT: Primary | ICD-10-CM

## 2022-06-10 DIAGNOSIS — L24.7 IRRITANT CONTACT DERMATITIS DUE TO PLANTS, EXCEPT FOOD: ICD-10-CM

## 2022-06-10 DIAGNOSIS — Z13.39 SCREENING FOR ALCOHOLISM: ICD-10-CM

## 2022-06-10 PROCEDURE — 99214 OFFICE O/P EST MOD 30 MIN: CPT | Performed by: FAMILY MEDICINE

## 2022-06-10 PROCEDURE — G0439 PPPS, SUBSEQ VISIT: HCPCS | Performed by: FAMILY MEDICINE

## 2022-06-10 RX ORDER — LEVOCETIRIZINE DIHYDROCHLORIDE 5 MG/1
5 TABLET, FILM COATED ORAL DAILY
COMMUNITY

## 2022-06-10 NOTE — PROGRESS NOTES
Subjective: Annual Wellness Visit and Diabetes    HPI:  Rash  Doing well with the topical CS cream for contact dermatitis. Diabetes. Sugars controlled well on last check on metformin and bydureon 1 inj/wk. Weight about the same. Hypoglycemia: none. Tolerating current treatment well. Current medications include metformin ER 2000mg/d and bydureon bcise weekly. Lab Results   Component Value Date/Time    Hemoglobin A1c 7.4 (H) 03/09/2022 11:45 AM    Hemoglobin A1c 7.3 (H) 09/24/2021 03:30 PM    Hemoglobin A1c 7.2 (H) 05/12/2021 11:51 AM    Glucose 199 (H) 03/09/2022 11:45 AM    Glucose (POC) 128 (H) 02/22/2017 01:22 PM    Microalbumin/Creat ratio (mg/g creat) 26 09/24/2021 03:30 PM    Microalbumin,urine random 3.77 09/24/2021 03:30 PM    Microalbumin/creat ratio (POC) <30 05/07/2018 02:34 PM    LDL, calculated 26.8 09/24/2021 03:30 PM    Creatinine 0.63 03/09/2022 11:45 AM     Last eye exam performed last year with Dr. Aixa Lucas [facility] in [address] [address], [address]. Pt plans to make recheck appt soon. Hypertension. Blood pressures in goal. Management at last visit included con't current tx. Current regimen: angiotensin II receptor antagonist. Symptoms include no symptoms. Patient denies chest pain, palpitations, peripheral edema. Lab review:   Lab Results   Component Value Date/Time    Sodium 139 03/09/2022 11:45 AM    Potassium 4.5 03/09/2022 11:45 AM    Chloride 105 03/09/2022 11:45 AM    CO2 31 03/09/2022 11:45 AM    Anion gap 3 (L) 03/09/2022 11:45 AM    Glucose 199 (H) 03/09/2022 11:45 AM    BUN 16 03/09/2022 11:45 AM    Creatinine 0.63 03/09/2022 11:45 AM    BUN/Creatinine ratio 25 (H) 03/09/2022 11:45 AM    GFR est AA >60 03/09/2022 11:45 AM    GFR est non-AA >60 03/09/2022 11:45 AM    Calcium 10.0 03/09/2022 11:45 AM     Hyperlipidemia. On lipitor 20. Sommer well. No myalgias, arthralgias, unusual weakness.   Lab Results   Component Value Date/Time    Cholesterol, total 146 09/24/2021 03:30 PM    HDL Cholesterol 56 09/24/2021 03:30 PM    LDL, calculated 26.8 09/24/2021 03:30 PM    VLDL, calculated 63.2 09/24/2021 03:30 PM    Triglyceride 316 (H) 09/24/2021 03:30 PM    CHOL/HDL Ratio 2.6 09/24/2021 03:30 PM     Lab Results   Component Value Date/Time    ALT (SGPT) 30 09/24/2021 03:30 PM    Alk. phosphatase 103 09/24/2021 03:30 PM    Bilirubin, total 0.3 09/24/2021 03:30 PM     Hearing loss  PT reports worsening auditory acuity. Told by her audiologist that she needed her earwax removed. PMH, , Medications/Allergies: reviewed, on chart. Current Outpatient Medications   Medication Sig    levocetirizine (Xyzal) 5 mg tablet Take 5 mg by mouth daily.  mometasone (ELOCON) 0.1 % topical cream AAA BID prn itching bumps    predniSONE (DELTASONE) 20 mg tablet 3 po qdx4 d, then 2 po qdx4 d then 1 po qdx4 for rash.  glimepiride (AMARYL) 1 mg tablet Take 1 Tablet by mouth daily as needed (sugar >200).  metFORMIN ER (GLUCOPHAGE XR) 500 mg tablet TAKE TWO TABLETS BY MOUTH TWO TIMES A DAY FOR TYPE 2 DIABETES MELLITUS    atorvastatin (LIPITOR) 20 mg tablet TAKE 1 TABLET BY MOUTH DAILY TO PREVENT CARDIAC DISEASE    losartan (COZAAR) 100 mg tablet TAKE 1 TABLET DAILY FOR PRESSURE    Bydureon BCise 2 mg/0.85 mL atIn INJECT 1 INJECTION WEEKLY FOR DIABETES    clotrimazole (LOTRIMIN) 1 % topical cream Apply  to affected area two (2) times a day.  AMOXICILLIN PO Take  by mouth. Takes 4 tabs prior to dental work     No current facility-administered medications for this visit.       Allergies   Allergen Reactions    Ace Inhibitors Unable to Obtain    Antihist [Diphenhydramine Hcl] Hives    Diphenhydramine Hives    Zithromax [Azithromycin] Itching       ROS:  Constitutional: No fever, chills or abnormal weight loss  Respiratory: No cough, SOB   CV: No chest pain or Palpitations    Visit Vitals  BP (!) 122/58   Pulse 75   Temp 97.1 °F (36.2 °C) (Temporal)   Resp 22   Ht 5' 2\" (1.575 m)   Wt 165 lb 12.8 oz (75.2 kg)   SpO2 97%   BMI 30.33 kg/m²     0#  Wt Readings from Last 3 Encounters:   06/10/22 165 lb 12.8 oz (75.2 kg)   06/03/22 165 lb (74.8 kg)   10/01/21 171 lb 8 oz (77.8 kg)     BP Readings from Last 3 Encounters:   06/10/22 (!) 122/58   06/03/22 122/60   10/01/21 120/76       Objective:     Physical Examination: General appearance - alert, well appearing, and in no distress  Mental status - alert, oriented to person, place, and time  Eyes - pupils equal and reactive, extraocular eye movements intact  ENT - bilateral external ears with heavy cerumen impaction. Ext nose normal. Normal lips  Neck - supple, no significant adenopathy, no thyromegaly or mass  Lymphatics - no palpable lymphadenopathy, no hepatosplenomegaly  Chest - clear to auscultation, no wheezes, rales or rhonchi, symmetric air entry  Heart - normal rate, regular rhythm, normal S1, S2, no murmurs, rubs, clicks or gallops  Extremities - peripheral pulses normal, no pedal edema, no clubbing or cyanosis  Skin- erythematous plaques to the R upper anterior arm  Foot check: No calluses, POS dry tinea bilat. DP, PT pulses 2+ bilat. Monofilament test normal bilat. Assessment & Plan:     Contact dermatitis  Healing well. Con't with elocon 0.1% cr AAA BID PRN itching and finish prednisone taper soon. Zay Rodriguez DM2  Doing well on bydureon 1 inj/week and metformin, yulissa well. Check a1c, BMP. If A1c still in the 6's, plan check q6mo. HTN  Good today. monitor. Con't current tx for now and recheck lytes, GFR in BMP     HLD  Well controlled. Plan recheck labs fall, adjust PRN, con't current tx if in goal.    Flu shot due this fall    Wants results by letter. F/U 3mo/PRN.

## 2022-06-10 NOTE — PATIENT INSTRUCTIONS
Medicare Wellness Visit    The best way to live healthy is to have a lifestyle where you eat a well-balanced diet, exercise regularly, limit alcohol use, and quit all forms of tobacco/nicotine, if applicable. Regular preventive services are another way to keep healthy. Preventive services (vaccines, screening tests, monitoring & exams) can help personalize your care plan, which helps you manage your own care. Screening tests can find health problems at the earliest stages, when they are easiest to treat. [facility] follows the current, evidence-based guidelines published by the [facility] ([facility]) when recommending preventive services for our patients. Because we follow these guidelines, sometimes recommendations change over time as research supports it. (For example, a prostate screening blood test is no longer routinely recommended for men with no symptoms.)  Of course, you and your doctor may decide to screen more often for some diseases, based on your risk and co-morbidities (chronic disease you are already diagnosed with). Preventive services for you include:  - Medicare offers their members a free annual wellness visit, which is time for you and your primary care provider to discuss and plan for your preventive service needs. Take advantage of this benefit every year!     Here is a list of your current Health Maintenance items (your personalized list of preventive services) with a due date:    Health Maintenance Due   Topic Date Due    Eye Exam  03/05/2021    COVID-19 Vaccine (3 - Booster for IActive series) 08/03/2021

## 2022-06-10 NOTE — PROGRESS NOTES
Carolyn Steiner is a 80 y.o. female presenting for/with:    Chief Complaint   Patient presents with    Annual Wellness Visit    Diabetes       Visit Vitals  BP (!) 122/58   Pulse 75   Temp 97.1 °F (36.2 °C) (Temporal)   Resp 22   Ht 5' 2\" (1.575 m)   Wt 165 lb 12.8 oz (75.2 kg)   SpO2 97%   BMI 30.33 kg/m²     Pain Scale: /10  Pain Location:     1. \"Have you been to the ER, urgent care clinic since your last visit? Hospitalized since your last visit? \" No    2. \"Have you seen or consulted any other health care providers outside of the [address] [place] [address] since your last visit? \" No     3. For patients aged 39-70: Has the patient had a colonoscopy / FIT/ Cologuard? NA - based on age      If the patient is female:    4. For patients aged 41-77: Has the patient had a mammogram within the past 2 years? NA - based on age or sex      11. For patients aged 21-65: Has the patient had a pap smear?  NA - based on age or sex      Symptom review:  NO  Fever   NO  Shaking chills  NO  Cough  NO  Body aches  NO  Coughing up blood  NO  Chest congestion  NO  Chest pain  NO  Shortness of breath  NO  Profound Loss of smell/taste  NO  Nausea/Vomiting   NO  Loose stool/Diarrhea  YES  any skin issues    Patient Risk Factors Reviewed as follows:  NO  have you been in Close contact with confirmed COVID19 patient   NO  History of recent travel to affected geographical areas within the past 14 days  NO  COPD  NO  Active Cancer/Leukemia/Lymphoma/Chemotherapy  NO  Oral steroid use  NO  Pregnant  YES  Diabetes Mellitus  NO  Heart disease  NO  Asthma  NO Health care worker at home  [address] care worker  NO Is there a Pregnant Woman in the home  NO Dialysis pt in the home   NO a large number of people living in the home    Learning Assessment 5/12/2021   PRIMARY LEARNER Patient   PRIMARY LANGUAGE ENGLISH   LEARNER PREFERENCE PRIMARY READING   ANSWERED BY patient   RELATIONSHIP SELF     Fall Risk Assessment, last 12 mths 6/10/2022   Able to walk? Yes   Fall in past 12 months? 0   Do you feel unsteady? 0   Are you worried about falling 0   Number of falls in past 12 months -   Fall with injury? -       3 most recent PHQ Screens 6/10/2022   PHQ Not Done -   Little interest or pleasure in doing things Several days   Feeling down, depressed, irritable, or hopeless Several days   Total Score PHQ 2 2     Abuse Screening Questionnaire 6/10/2022   Do you ever feel afraid of your partner? N   Are you in a relationship with someone who physically or mentally threatens you? N   Is it safe for you to go home? Y       ADL Assessment 6/10/2022   Feeding yourself No Help Needed   Getting from bed to chair No Help Needed   Getting dressed No Help Needed   Bathing or showering No Help Needed   Walk across the room (includes cane/walker) No Help Needed   Using the telphone No Help Needed   Taking your medications No Help Needed   Preparing meals No Help Needed   Managing money (expenses/bills) No Help Needed   Moderately strenuous housework (laundry) No Help Needed   Shopping for personal items (toiletries/medicines) No Help Needed   Shopping for groceries No Help Needed   Driving No Help Needed   Climbing a flight of stairs No Help Needed   Getting to places beyond walking distances No Help Needed      Advance Care Planning 6/10/2022   Patient's Healthcare Decision Maker is: Named in scanned ACP document   Primary Decision Maker Name -   Primary Decision Maker Phone Number -   Primary Decision Maker Relationship to Patient -   Confirm Advance Directive Yes, on file   Does the patient have other document types -          This is the Subsequent Medicare Annual Wellness Exam, performed 12 months or more after the Initial AWV or the last Subsequent AWV    I have reviewed the patient's medical history in detail and updated the computerized patient record.        Assessment/Plan   Education and counseling provided:  Are appropriate based on today's review and evaluation    1. Medicare annual wellness visit, subsequent  2. Screening for alcoholism  3. Screening for depression  -     DEPRESSION SCREEN ANNUAL  4. Type 2 diabetes mellitus with diabetic nephropathy, without long-term current use of insulin (HCC)  -     HEMOGLOBIN A1C WITH EAG; Future  -     METABOLIC PANEL, BASIC; Future  -      DIABETES FOOT EXAM       Depression Risk Factor Screening     3 most recent PHQ Screens 6/10/2022   PHQ Not Done -   Little interest or pleasure in doing things Several days   Feeling down, depressed, irritable, or hopeless Several days   Total Score PHQ 2 2       Alcohol & Drug Abuse Risk Screen    Do you average more than 1 drink per night or more than 7 drinks a week:  No    On any one occasion in the past three months have you have had more than 3 drinks containing alcohol:  No          Functional Ability and Level of Safety    Hearing: Hearing is good. Activities of Daily Living: The home contains: no safety equipment. Patient does total self care      Ambulation: with no difficulty     Fall Risk:  Fall Risk Assessment, last 12 mths 6/10/2022   Able to walk? Yes   Fall in past 12 months? 0   Do you feel unsteady? 0   Are you worried about falling 0   Number of falls in past 12 months -   Fall with injury?  -      Abuse Screen:  Patient is not abused       Cognitive Screening    Has your family/caregiver stated any concerns about your memory: no         Health Maintenance Due     Health Maintenance Due   Topic Date Due    Eye Exam Retinal or Dilated  03/05/2021    COVID-19 Vaccine (3 - Booster for Pfizer series) 08/03/2021       Patient Care Team   Patient Care Team:  Isaak Gamboa MD as PCP - General (Family Medicine)  Isaak Gamboa MD as PCP - Finesse Ferreira Provider    History     Patient Active Problem List   Diagnosis Code    Hyperlipidemia E78.5    HTN (hypertension) I10    Type 2 diabetes mellitus with diabetic nephropathy ([facility] [address] E11.21    Advance directive on file Z78.9    Tubular adenoma of colon D12.6     Past Medical History:   Diagnosis Date    Basal cell cancer     Forehead ,Nose,    CAD (coronary artery disease)     lipids    Cancer ([facility] [address] 1996    Breast    Diabetes ([facility] [address]     Diverticulosis     Hypercholesterolemia     Hypertension     Ill-defined condition     blood transfusion hx with both knee reolacement--autologous    Nausea & vomiting     Obesity     Sleep apnea     C PAP      Past Surgical History:   Procedure Laterality Date    HX BREAST BIOPSY      left    HX CATARACT REMOVAL      bilateral    HX GYN       O,ParaO    HX HEENT      scalp excision    HX HEENT      MOH's nose    HX TONSILLECTOMY      MS TOTAL KNEE ARTHROPLASTY  6429,9393    bilateral     Current Outpatient Medications   Medication Sig Dispense Refill    levocetirizine (Xyzal) 5 mg tablet Take 5 mg by mouth daily.  mometasone (ELOCON) 0.1 % topical cream AAA BID prn itching bumps 45 g 3    predniSONE (DELTASONE) 20 mg tablet 3 po qdx4 d, then 2 po qdx4 d then 1 po qdx4 for rash. 24 Tablet 0    glimepiride (AMARYL) 1 mg tablet Take 1 Tablet by mouth daily as needed (sugar >200). 30 Tablet 0    metFORMIN ER (GLUCOPHAGE XR) 500 mg tablet TAKE TWO TABLETS BY MOUTH TWO TIMES A DAY FOR TYPE 2 DIABETES MELLITUS 360 Tablet 1    atorvastatin (LIPITOR) 20 mg tablet TAKE 1 TABLET BY MOUTH DAILY TO PREVENT CARDIAC DISEASE 90 Tablet 3    losartan (COZAAR) 100 mg tablet TAKE 1 TABLET DAILY FOR PRESSURE 90 Tablet 3    Bydureon BCise 2 mg/0.85 mL atIn INJECT 1 INJECTION WEEKLY FOR DIABETES 3.4 mL 11    clotrimazole (LOTRIMIN) 1 % topical cream Apply  to affected area two (2) times a day. 45 g 3    AMOXICILLIN PO Take  by mouth.  Takes 4 tabs prior to dental work       Allergies   Allergen Reactions    Ace Inhibitors Unable to Obtain    Antihist [Diphenhydramine Hcl] Hives    Diphenhydramine Hives    Zithromax [Azithromycin] Itching Family History   Problem Relation Age of Onset    Diabetes Mother     Heart Disease Father     Cancer Brother         Colon     Social History     Tobacco Use    Smoking status: Never Smoker    Smokeless tobacco: Never Used   Substance Use Topics    Alcohol use: No     Alcohol/week: 0.0 standard drinks     Lachelle Pi

## 2022-06-11 LAB
ANION GAP SERPL CALC-SCNC: 7 MMOL/L (ref 5–15)
BUN SERPL-MCNC: 26 MG/DL (ref 6–20)
BUN/CREAT SERPL: 34 (ref 12–20)
CALCIUM SERPL-MCNC: 10.3 MG/DL (ref 8.5–10.1)
CHLORIDE SERPL-SCNC: 106 MMOL/L (ref 97–108)
CO2 SERPL-SCNC: 27 MMOL/L (ref 21–32)
CREAT SERPL-MCNC: 0.76 MG/DL (ref 0.55–1.02)
EST. AVERAGE GLUCOSE BLD GHB EST-MCNC: 157 MG/DL
GLUCOSE SERPL-MCNC: 164 MG/DL (ref 65–100)
HBA1C MFR BLD: 7.1 % (ref 4–5.6)
POTASSIUM SERPL-SCNC: 4 MMOL/L (ref 3.5–5.1)
SODIUM SERPL-SCNC: 140 MMOL/L (ref 136–145)

## 2022-06-21 ENCOUNTER — TELEPHONE (OUTPATIENT)
Dept: FAMILY MEDICINE CLINIC | Age: 82
End: 2022-06-21

## 2022-06-21 ENCOUNTER — CLINICAL SUPPORT (OUTPATIENT)
Dept: FAMILY MEDICINE CLINIC | Age: 82
End: 2022-06-21
Payer: MEDICARE

## 2022-06-21 DIAGNOSIS — H61.23 BILATERAL IMPACTED CERUMEN: Primary | ICD-10-CM

## 2022-06-21 PROCEDURE — 69209 REMOVE IMPACTED EAR WAX UNI: CPT | Performed by: FAMILY MEDICINE

## 2022-06-21 NOTE — PROGRESS NOTES
Subjective:     Hamida Mcadams is a 80 y.o. female who presents for evaluation of a plugged ear. She has noticed bilateral symptoms a few week ago. There is a prior history of cerumen impaction. Patient denies ear pain. Patient denies hearing loss. Objective: There were no vitals taken for this visit. General: alert, cooperative, no distress   Right Ear: ceruminosis noted. Left Ear:  ceruminosis noted. After removal: bilateral TM's and external ear canals normal, cerumen removed. Oropharynx:   normal.   Neck:  supple, symmetrical, trachea midline and no adenopathy. Assessment/Plan:     Cerumen Impaction, without otitis externa. 1. Cerumen removed by flushing, currettage. 2. Care instructions given. 3. Home treatment: none  4. Followup as needed. current treatment plan is effective, no change in therapy.

## 2022-06-21 NOTE — TELEPHONE ENCOUNTER
Pt here today for ear irrigation. She stated that her rash on right arm and side continues. Using cream ordered but not going away. Itches a lot per patient. Please advise.

## 2022-06-22 NOTE — TELEPHONE ENCOUNTER
Contacted pt. rec con't xyzal, can add zantac for extra relief (OTC), and con't elocon AAA BID. Is getting better per pt.

## 2022-08-04 ENCOUNTER — OFFICE VISIT (OUTPATIENT)
Dept: SLEEP MEDICINE | Age: 82
End: 2022-08-04
Payer: MEDICARE

## 2022-08-04 VITALS
HEART RATE: 77 BPM | SYSTOLIC BLOOD PRESSURE: 138 MMHG | BODY MASS INDEX: 30.4 KG/M2 | DIASTOLIC BLOOD PRESSURE: 69 MMHG | HEIGHT: 62 IN | WEIGHT: 165.2 LBS | OXYGEN SATURATION: 97 %

## 2022-08-04 DIAGNOSIS — E11.21 TYPE 2 DIABETES MELLITUS WITH DIABETIC NEPHROPATHY, WITHOUT LONG-TERM CURRENT USE OF INSULIN (HCC): ICD-10-CM

## 2022-08-04 DIAGNOSIS — G47.33 OSA (OBSTRUCTIVE SLEEP APNEA): Primary | ICD-10-CM

## 2022-08-04 DIAGNOSIS — I10 PRIMARY HYPERTENSION: ICD-10-CM

## 2022-08-04 PROCEDURE — G8754 DIAS BP LESS 90: HCPCS | Performed by: NURSE PRACTITIONER

## 2022-08-04 PROCEDURE — G8417 CALC BMI ABV UP PARAM F/U: HCPCS | Performed by: NURSE PRACTITIONER

## 2022-08-04 PROCEDURE — 99203 OFFICE O/P NEW LOW 30 MIN: CPT | Performed by: NURSE PRACTITIONER

## 2022-08-04 PROCEDURE — G8536 NO DOC ELDER MAL SCRN: HCPCS | Performed by: NURSE PRACTITIONER

## 2022-08-04 PROCEDURE — 3051F HG A1C>EQUAL 7.0%<8.0%: CPT | Performed by: NURSE PRACTITIONER

## 2022-08-04 PROCEDURE — 1090F PRES/ABSN URINE INCON ASSESS: CPT | Performed by: NURSE PRACTITIONER

## 2022-08-04 PROCEDURE — 1123F ACP DISCUSS/DSCN MKR DOCD: CPT | Performed by: NURSE PRACTITIONER

## 2022-08-04 PROCEDURE — G8752 SYS BP LESS 140: HCPCS | Performed by: NURSE PRACTITIONER

## 2022-08-04 PROCEDURE — 1101F PT FALLS ASSESS-DOCD LE1/YR: CPT | Performed by: NURSE PRACTITIONER

## 2022-08-04 PROCEDURE — G8427 DOCREV CUR MEDS BY ELIG CLIN: HCPCS | Performed by: NURSE PRACTITIONER

## 2022-08-04 PROCEDURE — G8399 PT W/DXA RESULTS DOCUMENT: HCPCS | Performed by: NURSE PRACTITIONER

## 2022-08-04 PROCEDURE — G8432 DEP SCR NOT DOC, RNG: HCPCS | Performed by: NURSE PRACTITIONER

## 2022-08-04 NOTE — PATIENT INSTRUCTIONS
[facility] [place]. [place], [address]  Tel.  [phone]  Fax. [address] [facility] 60  [place], [address] [facility]  Tel.  [phone]  Fax. [phone] [address]  Tel.  [phone]  Fax. [phone]     Learning About CPAP for Sleep Apnea  What is CPAP? CPAP is a small machine that you use at home every night while you sleep. It increases air pressure in your throat to keep your airway open. When you have sleep apnea, this can help you sleep better so you feel much better. CPAP stands for \"continuous positive airway pressure. \"  The CPAP machine will have one of the following:  A mask that covers your nose and mouth  Prongs that fit into your nose  A mask that covers your nose only, the most common type. This type is called NCPAP. The N stands for \"nasal.\"  Why is it done? CPAP is usually the best treatment for obstructive sleep apnea. It is the first treatment choice and the most widely used. Your doctor may suggest CPAP if you have: Moderate to severe sleep apnea. Sleep apnea and coronary artery disease (CAD) or heart failure. How does it help? CPAP can help you have more normal sleep, so you feel less sleepy and more alert during the daytime. CPAP may help keep heart failure or other heart problems from getting worse. NCPAP may help lower your blood pressure. If you use CPAP, your bed partner may also sleep better because you are not snoring or restless. What are the side effects? Some people who use CPAP have:  A dry or stuffy nose and a sore throat. Irritated skin on the face. Sore eyes. Bloating. If you have any of these problems, work with your doctor to fix them. Here are some things you can try:  Be sure the mask or nasal prongs fit well. See if your doctor can adjust the pressure of your CPAP. If your nose is dry, try a humidifier.   If your nose is runny or stuffy, try decongestant medicine or a steroid nasal spray. If these things do not help, you might try a different type of machine. Some machines have air pressure that adjusts on its own. Others have air pressures that are different when you breathe in than when you breathe out. This may reduce discomfort caused by too much pressure in your nose. Where can you learn more? Go to [employer]  Enter Yesy Lutz in the search box to learn more about \"Learning About CPAP for Sleep Apnea. \"   © 9850-4637 Healthwise, Incorporated. Care instructions adapted under license by [address] [place] [address] (which disclaims liability or warranty for this information). This care instruction is for use with your licensed healthcare professional. If you have questions about a medical condition or this instruction, always ask your healthcare professional. [address] any warranty or liability for your use of this information. Content Version: 0.3.43119; Last Revised: January 11, 2010  PROPER SLEEP HYGIENE    What to avoid  Do not have drinks with caffeine, such as coffee or black tea, for 8 hours before bed. Do not smoke or use other types of tobacco near bedtime. Nicotine is a stimulant and can keep you awake. Avoid drinking alcohol late in the evening, because it can cause you to wake in the middle of the night. Do not eat a big meal close to bedtime. If you are hungry, eat a light snack. Do not drink a lot of water close to bedtime, because the need to urinate may wake you up during the night. Do not read or watch TV in bed. Use the bed only for sleeping and sexual activity. What to try  Go to bed at the same time every night, and wake up at the same time every morning. Do not take naps during the day. Keep your bedroom quiet, dark, and cool. Get regular exercise, but not within 3 to 4 hours of your bedtime. .  Sleep on a comfortable pillow and mattress.   If watching the clock makes you anxious, turn it facing away from you so you cannot see the time. If you worry when you lie down, start a worry book. Well before bedtime, write down your worries, and then set the book and your concerns aside. Try meditation or other relaxation techniques before you go to bed. If you cannot fall asleep, get up and go to another room until you feel sleepy. Do something relaxing. Repeat your bedtime routine before you go to bed again. Make your house quiet and calm about an hour before bedtime. Turn down the lights, turn off the TV, log off the computer, and turn down the volume on music. This can help you relax after a busy day. Drowsy Driving: The [place] [address] cites drowsiness as a causing factor in more than 521,691 police reported crashes annually, resulting in 76,000 injuries and 1,500 deaths. Other surveys suggest 55% of people polled have driven while drowsy in the past year, 23% had fallen asleep but not crashed, 3% crashed, and 2% had and accident due to drowsy driving. Who is at risk? Young Drivers: One study of drowsy driving accidents states that 55% of the drivers were under 25 years. Of those, 75% were male. Shift Workers and Travelers: People who work overnight or travel across time zones frequently are at higher risk of experiencing Circadian Rhythm Disorders. They are trying to work and function when their body is programed to sleep. Sleep Deprived: Lack of sleep has a serious impact on your ability to pay attention or focus on a task. Consistently getting less than the average of 8 hours your body needs creates partial or cumulative sleep deprivation. Untreated Sleep Disorders: Sleep Apnea, Narcolepsy, R.L.S., and other sleep disorders (untreated) prevent a person from getting enough restful sleep. This leads to excessive daytime sleepiness and increases the risk for drowsy driving accidents by up to 7 times.   Medications / Alcohol: Even over the counter medications can cause drowsiness. Medications that impair a drivers attention should have a warning label. Alcohol naturally makes you sleepy and on its own can cause accidents. Combined with excessive drowsiness its effects are amplified. Signs of Drowsy Driving:   * You don't remember driving the last few miles   * You may drift out of your corie   * You are unable to focus and your thoughts wander   * You may yawn more often than normal   * You have difficulty keeping your eyes open / nodding off   * Missing traffic signs, speeding, or tailgating  Prevention-   Good sleep hygiene, lifestyle and behavioral choices have the most impact on drowsy driving. There is no substitute for sleep and the average person requires 8 hours nightly. If you find yourself driving drowsy, stop and sleep. Consider the sleep hygiene tips provided during your visit as well. Medication Refill Policy: Refills for all medications require 1 week advance notice. Please have your pharmacy fax a refill request. We are unable to fax, or call in \"controled substance\" medications and you will need to pick these prescriptions up from our office. [employer] Activation    Thank you for requesting access to [employer]. Please follow the instructions below to securely access and download your online medical record. [employer] allows you to send messages to your doctor, view your test results, renew your prescriptions, schedule appointments, and more. How Do I Sign Up? In your internet browser, go to [email or web address]. Click on the First Time User? Click Here link in the Sign In box. You will see the New Member Sign Up page. Enter your [employer] Access Code exactly as it appears below. You will not need to use this code after youve completed the sign-up process. If you do not sign up before the expiration date, you must request a new code. [employer] Access Code:  Activation code not generated  Current [employer] Status: Active (This is the date your [employer] access code will )    Enter the last four digits of your Social Security Number (xxxx) and Date of Birth (mm/dd/yyyy) as indicated and click Submit. You will be taken to the next sign-up page. Create a [employer] ID. This will be your [employer] login ID and cannot be changed, so think of one that is secure and easy to remember. Create a [employer] password. You can change your password at any time. Enter your Password Reset Question and Answer. This can be used at a later time if you forget your password. Enter your e-mail address. You will receive e-mail notification when new information is available in [address] Click Sign Up. You can now view and download portions of your medical record. Click the [employer] link to download a portable copy of your medical information. Additional Information    If you have questions, please call [phone]. Remember, [employer] is NOT to be used for urgent needs. For medical emergencies, dial 911.

## 2022-08-04 NOTE — PROGRESS NOTES
[address] [place]. [place], [address]   Tel.  [phone]   Fax. 100 [facility]   [place], [address]   Tel.  [phone]   Fax. [phone] [address] Shama Fajardo [address]   Tel.  [phone]   Fax. 96 Allison Coles is a 80y.o. year old female referred by Jack Cee MD  for evaluation of a sleep disorder. Previous sleep apnea diagnosis 15+ years ago treated with PAP. Most recent sleep testing completed 4/2015 at [facility], copies provided by patient show in lab split sleep test 4/2015 which showed AHI of 41/hr with a lowest SpO2 of 83%, effectively treated at a CPAP pressure of 10 cmH2O. Clear Books (System One series 60) device set up 5/2015. ASSESSMENT/PLAN:      ICD-10-CM ICD-9-CM    1. FRANKIE (obstructive sleep apnea)  G47.33 327.23 AMB SUPPLY ORDER      CANCELED: AMB SUPPLY ORDER      2. Primary hypertension  I10 401.9       3. Type 2 diabetes mellitus with diabetic nephropathy, without long-term current use of insulin (HCC)  E11.21 250.40      583.81       4. Adult BMI 30.0-30.9 kg/sq m  Z68.30 V85.30           AHI: 41(4/2015). External Lab. On Respironics CPAP: 10 cm H2O Set up 5/2015. Serial # [id number]    Patient has a history and examination consistent with the diagnosis of sleep apnea. Her device is affected by the Jacky recall, review of  Care  shows no new device set up at this time. Her device does not show registered in the Jacky system - she was unaware of recall until recently. Follow-up and Dispositions    Return in about 3 months (around 11/4/2022) for first adherence on new device. 1 - Sleep Apnea - continue on current pressures. New PAP device needed, current device has exceeded its life expectancy, is outdated and new technology is required.     * We have discussed the Jacky Respironics device recall and the need to register the device with Jacky, she was given the recall information sheet with phone number and website. Orders Placed This Encounter    AMB SUPPLY ORDER     Diagnosis: (G47.33) FRANKIE (obstructive sleep apnea)  (primary encounter diagnosis)      New APAP device needed, current device has exceeded its life expectancy (set up 5/2015 - affected by recall), is outdated and new technology is required. Willing to wait for Resmed Device    Positive Airway Pressure Therapy: Duration of need: 99 months.   ResMed APAP Device: Minimum Pressure: 9 cmH2O, Maximum Pressure: 12 cmH2O. Ramp Time: 20 Minutes. EPR: 2.   Heated Humidifier  Allen Modem Access  Length of Need: 99 months     Nasal Pillows (Replace) 2 per month.  Nasal Interface Mask 1 every 3 months.  Pos Airway pressure chin strap   Headgear 1 every 6 months.   Tubing with non-heating element 1 every 3 months.  Filter(s) Disposable 2 per month.  Filter(s) Non-Disposable 1 every 6 months. .   [facility] for Humidifier (Replace) 1 every 6 months. BETTY Ochoa-BC; NPI: [id number]    Electronically signed. Date:- 08/05/22     * The patient was counseled regarding proper sleep hygiene, with emphasis on ensuring sufficient total sleep time; safe driving and the benefits of exercise and weight loss. * We discussed the risk associated with use of cleaning devices and she will continue with use of dish soap and water as the appropriate cleaning method. * All of her questions were addressed. 2. Hypertension -  continue on her current regimen, she will continue to monitor her BP and follow up with her PMD for reevaluation/adjustment of medications if warranted. I have reviewed the relationship between hypertension as it relates to sleep-disordered breathing. 3. Type II diabetes -  she continues on her current regimen.   I have reviewed the relationship between sleep disordered breathing as it relates to diabetes. 4. Encouraged continued weight management program through appropriate diet and exercise regimen as significant weight reduction has been shown to reduce severity of obstructive sleep apnea. SUBJECTIVE/OBJECTIVE:    Dominique Bui. Jesus Manuel Wise reports she has not  experienced excessive daytime sleepiness since starting PAP therapy. She reports she had used her device nightly for 15+ years and was unaware of the Jacky recall until recently. She would like a new device. Drowsiness no Problems exhaling no   Snoring no Forget to put on no   Mask Comfortable yes Can't fall asleep no   Dry Mouth no Mask falls off no   Air Leaking no Frequent awakenings no       She admits that her sleep has improved on PAP therapy using nasal pillows mask and non-heated tubing. She denies foam recall related symptoms. She has not every used an ozone  on the device, but she was given a soclean as a gift. Card access not available for device, manual review of device shows -   Set pressure: 10 cmH2O. Ramp 40 min, Flex 2      Review of Systems:  Constitutional:  No significant weight loss or weight gain. Eyes:  no blurred vision. CVS:  No significant chest pain  Pulm:  No significant shortness of breath  GI:  No significant nausea or vomiting  :  No significant nocturia  Musculoskeletal:  No significant joint pain at night  Skin:  No significant rashes  Neuro:  No significant dizziness   Psych:  No active mood issues    Sleep Review of Systems: notable for Negative difficulty falling asleep; Positive perceived regular dreaming; Negative nightmares; negative heartburn;  Negative history of any automobile or occupational accidents due to daytime drowsiness. [place] Sleepiness Score: 6 and Modified F.O.S.Q. Score Total / 2: 16.5 which reflect mild daytime sleepiness.     Visit Vitals  /69 (BP 1 Location: Left arm, BP Patient Position: Sitting)   Pulse 77   Ht 5' 2\" (1.575 m)   Wt 165 lb 3.2 oz (74.9 kg)   SpO2 97%   BMI 30.22 kg/m²           General:   Alert, oriented, not in acute distress   Eyes:  Anicteric Sclerae; intact EOM's   Nose:  No obvious nasal septum deviation    Neck:   midline trachea,  no JVD   Chest/Lungs:  symmetrical lung expansion ,clear lung fields on auscultation    CVS:  Normal rate, regular rhythm    Extremities:  No obvious rashes    Neuro:  No focal deficits; No obvious tremor    Psych:  Normal eye contact; normal  affect, normal countenance      Patient's phone number [phone] (home)  was reviewed and confirmed for accuracy. She gives permission for messages regarding results and appointments to be left at that number. An electronic signature was used to authenticate this note.     -- Baron Arnold NP, [facility]  08/05/22

## 2022-08-05 ENCOUNTER — DOCUMENTATION ONLY (OUTPATIENT)
Dept: SLEEP MEDICINE | Age: 82
End: 2022-08-05

## 2022-08-26 ENCOUNTER — DOCUMENTATION ONLY (OUTPATIENT)
Dept: SLEEP MEDICINE | Age: 82
End: 2022-08-26

## 2022-09-21 ENCOUNTER — OFFICE VISIT (OUTPATIENT)
Dept: FAMILY MEDICINE CLINIC | Age: 82
End: 2022-09-21
Payer: MEDICARE

## 2022-09-21 VITALS
BODY MASS INDEX: 30.25 KG/M2 | HEIGHT: 62 IN | RESPIRATION RATE: 20 BRPM | SYSTOLIC BLOOD PRESSURE: 128 MMHG | OXYGEN SATURATION: 96 % | TEMPERATURE: 97.1 F | WEIGHT: 164.4 LBS | HEART RATE: 80 BPM | DIASTOLIC BLOOD PRESSURE: 64 MMHG

## 2022-09-21 DIAGNOSIS — E78.00 PURE HYPERCHOLESTEROLEMIA: ICD-10-CM

## 2022-09-21 DIAGNOSIS — Z23 NEEDS FLU SHOT: ICD-10-CM

## 2022-09-21 DIAGNOSIS — M19.049 HAND ARTHRITIS: ICD-10-CM

## 2022-09-21 DIAGNOSIS — I10 ESSENTIAL HYPERTENSION: ICD-10-CM

## 2022-09-21 DIAGNOSIS — E11.21 TYPE 2 DIABETES MELLITUS WITH DIABETIC NEPHROPATHY, WITHOUT LONG-TERM CURRENT USE OF INSULIN (HCC): Primary | ICD-10-CM

## 2022-09-21 PROCEDURE — 90694 VACC AIIV4 NO PRSRV 0.5ML IM: CPT | Performed by: FAMILY MEDICINE

## 2022-09-21 PROCEDURE — G0008 ADMIN INFLUENZA VIRUS VAC: HCPCS | Performed by: FAMILY MEDICINE

## 2022-09-21 PROCEDURE — 99214 OFFICE O/P EST MOD 30 MIN: CPT | Performed by: FAMILY MEDICINE

## 2022-09-21 NOTE — PATIENT INSTRUCTIONS
Vaccine Information Statement    Influenza (Flu) Vaccine (Inactivated or Recombinant): What You Need to Know    Many vaccine information statements are available in Indonesian and other languages. See www.immunize.org/vis. Hojas de información sobre vacunas están disponibles en español y en muchos otros idiomas. Visite www.immunize.org/vis. 1. Why get vaccinated? Influenza vaccine can prevent influenza (flu). Flu is a contagious disease that spreads around the [facility] every year, usually between October and May. Anyone can get the flu, but it is more dangerous for some people. Infants and young children, people 72 years and older, pregnant people, and people with certain health conditions or a weakened immune system are at greatest risk of flu complications. Pneumonia, bronchitis, sinus infections, and ear infections are examples of flu-related complications. If you have a medical condition, such as heart disease, cancer, or diabetes, flu can make it worse. Flu can cause fever and chills, sore throat, muscle aches, fatigue, cough, headache, and runny or stuffy nose. Some people may have vomiting and diarrhea, though this is more common in children than adults. In an average year, thousands of people in the [facility] die from flu, and many more are hospitalized. Flu vaccine prevents millions of illnesses and flu-related visits to the doctor each year. 2. Influenza vaccines     CDC recommends everyone 6 months and older get vaccinated every flu season. Children 6 months through 6years of age may need 2 doses during a single flu season. Everyone else needs only 1 dose each flu season. It takes about 2 weeks for protection to develop after vaccination. There are many flu viruses, and they are always changing. Each year a new flu vaccine is made to protect against the influenza viruses believed to be likely to cause disease in the upcoming flu season.  Even when the vaccine doesnt exactly match these viruses, it may still provide some protection. Influenza vaccine does not cause flu. Influenza vaccine may be given at the same time as other vaccines. 3. Talk with your health care provider    Tell your vaccination provider if the person getting the vaccine:  Has had an allergic reaction after a previous dose of influenza vaccine, or has any severe, life-threatening allergies   Has ever had Guillain-Barré Syndrome (also called GBS)    In some cases, your health care provider may decide to postpone influenza vaccination until a future visit. Influenza vaccine can be administered at any time during pregnancy. People who are or will be pregnant during influenza season should receive inactivated influenza vaccine. People with minor illnesses, such as a cold, may be vaccinated. People who are moderately or severely ill should usually wait until they recover before getting influenza vaccine. Your health care provider can give you more information. 4. Risks of a vaccine reaction    Soreness, redness, and swelling where the shot is given, fever, muscle aches, and headache can happen after influenza vaccination. There may be a very small increased risk of Guillain-Barré Syndrome (GBS) after inactivated influenza vaccine (the flu shot). Allegra Flow children who get the flu shot along with pneumococcal vaccine (PCV13) and/or DTaP vaccine at the same time might be slightly more likely to have a seizure caused by fever. Tell your health care provider if a child who is getting flu vaccine has ever had a seizure. People sometimes faint after medical procedures, including vaccination. Tell your provider if you feel dizzy or have vision changes or ringing in the ears. As with any medicine, there is a very remote chance of a vaccine causing a severe allergic reaction, other serious injury, or death. 5. What if there is a serious problem?     An allergic reaction could occur after the vaccinated person leaves the clinic. If you see signs of a severe allergic reaction (hives, swelling of the face and throat, difficulty breathing, a fast heartbeat, dizziness, or weakness), call 9-1-1 and get the person to the nearest hospital.    For other signs that concern you, call your health care provider. Adverse reactions should be reported to the Vaccine Adverse Event Reporting System (VAERS). Your health care provider will usually file this report, or you can do it yourself. Visit the VAERS website at www.vaers. [facility].gov or call 4-390.369.6752. VAERS is only for reporting reactions, and VAERS staff members do not give medical advice. 6. The National Vaccine Injury Compensation Program    The [facility] Vaccine Injury Compensation Program (VICP) is a federal program that was created to compensate people who may have been injured by certain vaccines. Claims regarding alleged injury or death due to vaccination have a time limit for filing, which may be as short as two years. Visit the VICP website at www.[facility].gov/vaccinecompensation or call 1-884.207.4750 to learn about the program and about filing a claim. 7. How can I learn more? Ask your health care provider. Call your local or state health department. Visit the website of the Food and Drug Administration (FDA) for vaccine package inserts and additional information at www.fda.gov/vaccines-blood-biologics/vaccines. Contact the Centers for Disease Control and Prevention (CDC): Call 3-657.812.4866 (3-116-DIZ-INFO) or  Visit CDCs influenza website at www.cdc.gov/flu. Vaccine Information Statement   Inactivated Influenza Vaccine   8/6/2021  42 GEOFFREY De Dios [id number]-83   Department of Health and Human Services  Centers for Disease Control and Prevention    Office Use Only

## 2022-09-21 NOTE — PROGRESS NOTES
Shakira Schmid is a 80 y.o. female presenting for/with:    Chief Complaint   Patient presents with    Diabetes     3 month follow up    Hypertension    Hand Pain     bilateral           Visit Vitals  /64   Pulse 80   Temp 97.1 °F (36.2 °C) (Temporal)   Resp 20   Ht 5' 2\" (1.575 m)   Wt 164 lb 6.4 oz (74.6 kg)   SpO2 96%   BMI 30.07 kg/m²     Pain Scale: /10  Pain Location:     1. \"Have you been to the ER, urgent care clinic since your last visit? Hospitalized since your last visit? \" No    2. \"Have you seen or consulted any other health care providers outside of the [address] [place] [address] since your last visit? \" No     3. For patients aged 39-70: Has the patient had a colonoscopy / FIT/ Cologuard? NA - based on age      If the patient is female:    4. For patients aged 41-77: Has the patient had a mammogram within the past 2 years? NA - based on age or sex      11. For patients aged 21-65: Has the patient had a pap smear? NA - based on age or sex      Symptom review:  Learning Assessment 5/12/2021   PRIMARY LEARNER Patient   PRIMARY LANGUAGE ENGLISH   LEARNER PREFERENCE PRIMARY READING   ANSWERED BY patient   RELATIONSHIP SELF     Fall Risk Assessment, last 12 mths 9/21/2022   Able to walk? Yes   Fall in past 12 months? 0   Do you feel unsteady? 0   Are you worried about falling 0   Number of falls in past 12 months -   Fall with injury? -       3 most recent PHQ Screens 9/21/2022   PHQ Not Done -   Little interest or pleasure in doing things Not at all   Feeling down, depressed, irritable, or hopeless Not at all   Total Score PHQ 2 0     Abuse Screening Questionnaire 9/21/2022   Do you ever feel afraid of your partner? N   Are you in a relationship with someone who physically or mentally threatens you? N   Is it safe for you to go home?  Y       ADL Assessment 9/21/2022   Feeding yourself No Help Needed   Getting from bed to chair No Help Needed   Getting dressed No Help Needed   Bathing or showering No Help Needed   Walk across the room (includes cane/walker) No Help Needed   Using the telphone No Help Needed   Taking your medications No Help Needed   Preparing meals No Help Needed   Managing money (expenses/bills) No Help Needed   Moderately strenuous housework (laundry) No Help Needed   Shopping for personal items (toiletries/medicines) No Help Needed   Shopping for groceries No Help Needed   Driving No Help Needed   Climbing a flight of stairs No Help Needed   Getting to places beyond walking distances No Help Needed      Advance Care Planning 9/21/2022   Patient's Healthcare Decision Maker is: Named in scanned ACP document   Primary Decision Maker Name -   Primary Decision [facility] Phone Number -   Primary Decision Maker Relationship to Patient -   Confirm Advance Directive Yes, on file   Does the patient have other document types -

## 2022-09-21 NOTE — PROGRESS NOTES
Subjective:   Diabetes (3 month follow up), Hypertension, and Hand Pain (bilateral)    HPI:  Hand arthritis  Had symmetric edema of the index MCP bilat about 3mo ago. Has been doing some refinishing of [place] kitchen [place], including a lot of scrubbing with a brush, rag, or scotch-brite. Not red, hot, but is puffy and sore to touch and sore with moving the index finger. Diabetes. Sugars controlled well on last check on metformin and bydureon 1 inj/wk. Weight about the same. Hypoglycemia: none. Tolerating current treatment well. Current medications include metformin ER 2000mg/d and bydureon bcise weekly. Lab Results   Component Value Date/Time    Hemoglobin A1c 7.1 (H) 06/10/2022 11:17 AM    Hemoglobin A1c 7.4 (H) 03/09/2022 11:45 AM    Hemoglobin A1c 7.3 (H) 09/24/2021 03:30 PM    Glucose 164 (H) 06/10/2022 11:17 AM    Glucose (POC) 128 (H) 02/22/2017 01:22 PM    Microalbumin/Creat ratio (mg/g creat) 26 09/24/2021 03:30 PM    Microalbumin,urine random 3.77 09/24/2021 03:30 PM    Microalbumin/creat ratio (POC) <30 05/07/2018 02:34 PM    LDL, calculated 26.8 09/24/2021 03:30 PM    Creatinine 0.76 06/10/2022 11:17 AM     Last eye exam performed last year with Dr. Mckenna navarro in [address] [facility], no DR. Pt plans to make recheck appt soon. Hypertension. Blood pressures in goal. Management at last visit included con't current tx. Current regimen: angiotensin II receptor antagonist. Symptoms include no symptoms. Patient denies chest pain, palpitations, peripheral edema.   Lab review:   Lab Results   Component Value Date/Time    Sodium 140 06/10/2022 11:17 AM    Potassium 4.0 06/10/2022 11:17 AM    Chloride 106 06/10/2022 11:17 AM    CO2 27 06/10/2022 11:17 AM    Anion gap 7 06/10/2022 11:17 AM    Glucose 164 (H) 06/10/2022 11:17 AM    BUN 26 (H) 06/10/2022 11:17 AM    Creatinine 0.76 06/10/2022 11:17 AM    BUN/Creatinine ratio 34 (H) 06/10/2022 11:17 AM    GFR est AA >60 06/10/2022 11:17 AM GFR est non-AA >60 06/10/2022 11:17 AM    Calcium 10.3 (H) 06/10/2022 11:17 AM     Hyperlipidemia. On lipitor 20. Sommer well. No myalgias, arthralgias, unusual weakness. Lab Results   Component Value Date/Time    Cholesterol, total 146 09/24/2021 03:30 PM    HDL Cholesterol 56 09/24/2021 03:30 PM    LDL, calculated 26.8 09/24/2021 03:30 PM    VLDL, calculated 63.2 09/24/2021 03:30 PM    Triglyceride 316 (H) 09/24/2021 03:30 PM    CHOL/HDL Ratio 2.6 09/24/2021 03:30 PM     Lab Results   Component Value Date/Time    ALT (SGPT) 30 09/24/2021 03:30 PM    Alk. phosphatase 103 09/24/2021 03:30 PM    Bilirubin, total 0.3 09/24/2021 03:30 PM       PMH, SH, Medications/Allergies: reviewed, on chart. Current Outpatient Medications   Medication Sig    levocetirizine (XYZAL) 5 mg tablet Take 5 mg by mouth daily. mometasone (ELOCON) 0.1 % topical cream AAA BID prn itching bumps    predniSONE (DELTASONE) 20 mg tablet 3 po qdx4 d, then 2 po qdx4 d then 1 po qdx4 for rash. glimepiride (AMARYL) 1 mg tablet Take 1 Tablet by mouth daily as needed (sugar >200). metFORMIN ER (GLUCOPHAGE XR) 500 mg tablet TAKE TWO TABLETS BY MOUTH TWO TIMES A DAY FOR TYPE 2 DIABETES MELLITUS    atorvastatin (LIPITOR) 20 mg tablet TAKE 1 TABLET BY MOUTH DAILY TO PREVENT CARDIAC DISEASE    losartan (COZAAR) 100 mg tablet TAKE 1 TABLET DAILY FOR PRESSURE    Bydureon BCise 2 mg/0.85 mL atIn INJECT 1 INJECTION WEEKLY FOR DIABETES    clotrimazole (LOTRIMIN) 1 % topical cream Apply  to affected area two (2) times a day. AMOXICILLIN PO Take  by mouth. Takes 4 tabs prior to dental work     No current facility-administered medications for this visit.       Allergies   Allergen Reactions    Ace Inhibitors Unable to Obtain    Antihist [Diphenhydramine Hcl] Hives    Diphenhydramine Hives    Zithromax [Azithromycin] Itching       ROS:  Constitutional: No fever, chills or abnormal weight loss  Respiratory: No cough, SOB   CV: No chest pain or Palpitations    Visit Vitals  /64   Pulse 80   Temp 97.1 °F (36.2 °C) (Temporal)   Resp 20   Ht 5' 2\" (1.575 m)   Wt 164 lb 6.4 oz (74.6 kg)   SpO2 96%   BMI 30.07 kg/m²     0#  Wt Readings from Last 3 Encounters:   09/21/22 164 lb 6.4 oz (74.6 kg)   08/04/22 165 lb 3.2 oz (74.9 kg)   06/10/22 165 lb 12.8 oz (75.2 kg)     BP Readings from Last 3 Encounters:   09/21/22 128/64   08/04/22 138/69   06/10/22 (!) 122/58       Objective:     Physical Examination: General appearance - alert, well appearing, and in no distress  Mental status - alert, oriented to person, place, and time  Eyes - pupils equal and reactive, extraocular eye movements intact  ENT - bilateral external ears with heavy cerumen impaction. Ext nose normal. Normal lips  Neck - supple, no significant adenopathy, no thyromegaly or mass  Lymphatics - no palpable lymphadenopathy, no hepatosplenomegaly  Chest - clear to auscultation, no wheezes, rales or rhonchi, symmetric air entry  Heart - normal rate, regular rhythm, normal S1, S2, no murmurs, rubs, clicks or gallops  Extremities - peripheral pulses normal, no pedal edema, no clubbing or cyanosis. Bilat hands with TTP to the index MCP bilat      Assessment & Plan:     DM2  Doing well on bydureon 1 inj/week and metformin, yulissa well. Check a1c, BMP. If A1c still in the 6's, plan check q6mo. HTN  Good today. monitor. Con't current tx for now and recheck lytes, GFR in BMP     HLD  Well controlled. Recheck labs fall, adjust PRN, con't current tx if in goal.    DJD index MCP  Likely overuse/ osteo, given lack of erythema, but will check ESR/CRP for signs of RA. Work on activity mod, including using tools with larger handles    Flu shot today. Wants results by letter. F/U 3mo/PRN.

## 2022-09-22 LAB
ALBUMIN SERPL-MCNC: 4 G/DL (ref 3.5–5)
ALBUMIN/GLOB SERPL: 1.4 {RATIO} (ref 1.1–2.2)
ALP SERPL-CCNC: 100 U/L (ref 45–117)
ALT SERPL-CCNC: 18 U/L (ref 12–78)
ANION GAP SERPL CALC-SCNC: 2 MMOL/L (ref 5–15)
AST SERPL-CCNC: 8 U/L (ref 15–37)
BILIRUB SERPL-MCNC: 0.3 MG/DL (ref 0.2–1)
BUN SERPL-MCNC: 17 MG/DL (ref 6–20)
BUN/CREAT SERPL: 24 (ref 12–20)
CALCIUM SERPL-MCNC: 10.8 MG/DL (ref 8.5–10.1)
CHLORIDE SERPL-SCNC: 107 MMOL/L (ref 97–108)
CHOLEST SERPL-MCNC: 153 MG/DL
CO2 SERPL-SCNC: 32 MMOL/L (ref 21–32)
CREAT SERPL-MCNC: 0.71 MG/DL (ref 0.55–1.02)
CREAT UR-MCNC: 134 MG/DL
CRP SERPL-MCNC: <0.29 MG/DL (ref 0–0.6)
ERYTHROCYTE [SEDIMENTATION RATE] IN BLOOD: 12 MM/HR (ref 0–30)
EST. AVERAGE GLUCOSE BLD GHB EST-MCNC: 154 MG/DL
GLOBULIN SER CALC-MCNC: 2.8 G/DL (ref 2–4)
GLUCOSE SERPL-MCNC: 150 MG/DL (ref 65–100)
HBA1C MFR BLD: 7 % (ref 4–5.6)
HDLC SERPL-MCNC: 61 MG/DL
HDLC SERPL: 2.5 {RATIO} (ref 0–5)
LDLC SERPL CALC-MCNC: 36.2 MG/DL (ref 0–100)
MICROALBUMIN UR-MCNC: 2.41 MG/DL
MICROALBUMIN/CREAT UR-RTO: 18 MG/G (ref 0–30)
POTASSIUM SERPL-SCNC: 4.4 MMOL/L (ref 3.5–5.1)
PROT SERPL-MCNC: 6.8 G/DL (ref 6.4–8.2)
SODIUM SERPL-SCNC: 141 MMOL/L (ref 136–145)
TRIGL SERPL-MCNC: 279 MG/DL (ref ?–150)
URATE SERPL-MCNC: 5.3 MG/DL (ref 2.6–6)
VLDLC SERPL CALC-MCNC: 55.8 MG/DL

## 2022-11-16 NOTE — PROGRESS NOTES
217 [facility] Michele. [place], [address]   Tel.  [phone]   Fax. 100 [facility] 60   [place], [address]   Tel.  [phone]   Fax. [phone]  [address]   Tel.  [phone]   Fax. 750 [address] (: 1940) is a 80 y.o. female, established patient, seen for positive airway pressure follow-up and evaluation. She was last seen by me on 2022, prior notes and sleep tests reviewed in detail. Previous sleep apnea diagnosis 15+ years ago treated with PAP. Most recent sleep testing completed 2015 at [address] [place] [address], copies provided by patient show in lab split sleep test 2015 which showed AHI of 41/hr with a lowest SpO2 of 83%, effectively treated at a CPAP pressure of 10 cmH2O. [employer] (System One series 60) device set up 2015. ASSESSMENT/PLAN:    ICD-10-CM ICD-9-CM    1. FRANKIE (obstructive sleep apnea)  G47.33 327.23 AMB SUPPLY ORDER      2. Primary hypertension  I10 401.9       3. Adult BMI 30.0-30.9 kg/sq m  Z68.30 V85.30           AHI: 41(2015). External Lab. On Respironics CPAP: 10 cm H2O Set up 2015. Serial # [id number]    Resmed A11 set up 2022 - serial # [id number] - no use    She is adherent with PAP therapy using her recalled Jacky device and PAP continues to benefit patient and remains necessary for control of her sleep apnea. She will start with Resmed Device tonight. Follow-up and Dispositions    Return in about 2 months (around 2023) for compliance follow up . Sleep Apnea -  Continue on current pressures. She was confused over what device to use, DME sent 3B device and then corrected order and sent ResMed device - patient did not open or use either device while waiting for follow up visit with sleep provider.     *  Prior device is affected by the Jacky recall, review of recall site shows device is not registered in the [employer] system. We have discussed the Jacky Respironics device recall, the need to register the device with Jacky, and I have advised of potential risk of continued therapy with recalled device. She will start using new device. * Supplies ordered - nasal pillows resmed P10 mask and heated tubing    Orders Placed This Encounter    AMB SUPPLY ORDER     Diagnosis: (G47.33) FRANKIE (obstructive sleep apnea)  (primary encounter diagnosis)     Replacement Supplies for RESMED A11 Positive Airway Pressure Therapy Device:   Duration of need: 99 months.  Nasal Pillows (Replace) 2 per month. Resmed P10 Small  [id number] Nasal Interface Mask 1 every 3 months.  Pos Airway pressure chin strap   Headgear 1 every 6 months.  Tubing with heating element 1 every 3 months.  Filter(s) Disposable 2 per month. RESMED A11   Filter(s) Non-Disposable 1 every 6 months. .   [address] [facility] for Humidifier (Replace) 1 every 6 months. HALEY Chow; NPI: [id number]    Electronically signed. Date:- 11/17/22           * Counseling was provided regarding the importance of regular PAP use with emphasis on ensuring sufficient total sleep time, proper sleep hygiene, and safe driving. * Re-enforced proper and regular cleaning for the device. * She was asked to contact our office for any problems regarding PAP therapy. 2. Recommended a dedicated weight loss program through appropriate diet and exercise regimen as significant weight reduction has been shown to reduce severity of obstructive sleep apnea. SUBJECTIVE/OBJECTIVE:    She  is seen today for follow up on PAP device and reports no problems using the device.    The following concerns identified:    Drowsiness no Problems exhaling no   Snoring no Forget to put on no   Mask Comfortable yes Can't fall asleep no   Dry Mouth no Mask falls off no   Air Leaking no Frequent awakenings no       She admits that her sleep has improved on PAP therapy using nasal pillows mask and heated tubing using her recalled phillip device. She notes that she does not sleep without device and if they lose power she is not able to sleep due to waking up. She is concerned that device is working correctly and her sleep apnea is well managed for her cardiac and overall health. We discussed that if sleep apnea is not well managed with new device or she feels her sleep apnea symptoms are worsening she can complete a sleep study titration to determine optimal PAP pressure. We reviewed the need to start using the resmed device, she will return the 3B device to [employer]. Per the equipment company they do not think that insurance will require a re-qualification study. Review of device download indicated:  Set pressure: 10 cmH2O; Average % Night in Large Leak:  0.6  % Used Days >= 4 hours: 100. Avg hours used:  10:30. Therapy Apnea Index averaged over PAP use: 1.0 /hr which reflects improved sleep breathing condition. [place] Sleepiness Score: 6 and Modified F.O.S.Q. Score Total / 2: 16 which reflects improved sleep quality over therapy time. Sleep Review of Systems: notable for Negative difficulty falling asleep; Positive awakenings at night; Negative early morning headaches; Negative memory problems; Negative concentration issues;  Negative chest pain; Negative shortness of breath; Negative significant joint pain at night; Negative significant muscle pain at night; Negative rashes or itching; Negative heartburn; Negative significant mood issues; daily afternoon naps with PAP      Visit Vitals  BP (!) 140/78   Pulse (!) 55   Ht 5' 2\" (1.575 m)   Wt 165 lb (74.8 kg)   SpO2 97%   BMI 30.18 kg/m²          General:   Alert, oriented, not in acute distress   Eyes:  Anicteric Sclerae; no obvious strabismus   Nose:  No obvious nasal septum deviation    Neck:   Midline trachea   Chest/Lungs:  Symmetrical lung expansion, clear lung fields on auscultation    CVS:  Normal rate, regular rhythm,  no JVD   Extremities:  No obvious rashes, no edema    Neuro:  No focal deficits; No obvious tremor    Psych:  Normal affect,  normal countenance     On this date 11/17/2022 I have spent 30 minutes reviewing previous notes, test results and face to face with the patient discussing the diagnosis and importance of compliance with the treatment plan as well as documenting on the day of the visit. An electronic signature was used to authenticate this note.     -- Maria Isabel Kwan NP, [facility]  11/17/22

## 2022-11-17 ENCOUNTER — OFFICE VISIT (OUTPATIENT)
Dept: SLEEP MEDICINE | Age: 82
End: 2022-11-17
Payer: MEDICARE

## 2022-11-17 VITALS
WEIGHT: 165 LBS | BODY MASS INDEX: 30.36 KG/M2 | OXYGEN SATURATION: 97 % | HEIGHT: 62 IN | SYSTOLIC BLOOD PRESSURE: 140 MMHG | DIASTOLIC BLOOD PRESSURE: 78 MMHG | HEART RATE: 55 BPM

## 2022-11-17 DIAGNOSIS — G47.33 OSA (OBSTRUCTIVE SLEEP APNEA): Primary | ICD-10-CM

## 2022-11-17 DIAGNOSIS — I10 PRIMARY HYPERTENSION: ICD-10-CM

## 2022-11-17 PROCEDURE — 1101F PT FALLS ASSESS-DOCD LE1/YR: CPT | Performed by: NURSE PRACTITIONER

## 2022-11-17 PROCEDURE — 1090F PRES/ABSN URINE INCON ASSESS: CPT | Performed by: NURSE PRACTITIONER

## 2022-11-17 PROCEDURE — 3074F SYST BP LT 130 MM HG: CPT | Performed by: NURSE PRACTITIONER

## 2022-11-17 PROCEDURE — G8753 SYS BP > OR = 140: HCPCS | Performed by: NURSE PRACTITIONER

## 2022-11-17 PROCEDURE — G8417 CALC BMI ABV UP PARAM F/U: HCPCS | Performed by: NURSE PRACTITIONER

## 2022-11-17 PROCEDURE — G8754 DIAS BP LESS 90: HCPCS | Performed by: NURSE PRACTITIONER

## 2022-11-17 PROCEDURE — 99214 OFFICE O/P EST MOD 30 MIN: CPT | Performed by: NURSE PRACTITIONER

## 2022-11-17 PROCEDURE — 3078F DIAST BP <80 MM HG: CPT | Performed by: NURSE PRACTITIONER

## 2022-11-17 PROCEDURE — G8536 NO DOC ELDER MAL SCRN: HCPCS | Performed by: NURSE PRACTITIONER

## 2022-11-17 PROCEDURE — G8399 PT W/DXA RESULTS DOCUMENT: HCPCS | Performed by: NURSE PRACTITIONER

## 2022-11-17 PROCEDURE — G8432 DEP SCR NOT DOC, RNG: HCPCS | Performed by: NURSE PRACTITIONER

## 2022-11-17 PROCEDURE — 1123F ACP DISCUSS/DSCN MKR DOCD: CPT | Performed by: NURSE PRACTITIONER

## 2022-11-17 PROCEDURE — G8427 DOCREV CUR MEDS BY ELIG CLIN: HCPCS | Performed by: NURSE PRACTITIONER

## 2022-11-17 NOTE — PATIENT INSTRUCTIONS
217 [facility] Michele. [place], [address]  Tel.  [phone]  Fax. 100 [facility] 60  [place], [address] [facility]  Tel.  [phone]  Fax. [phone] [address]  Tel.  [phone]  Fax. [phone]     Learning About CPAP for Sleep Apnea  What is CPAP? CPAP is a small machine that you use at home every night while you sleep. It increases air pressure in your throat to keep your airway open. When you have sleep apnea, this can help you sleep better so you feel much better. CPAP stands for \"continuous positive airway pressure. \"  The CPAP machine will have one of the following:  A mask that covers your nose and mouth  Prongs that fit into your nose  A mask that covers your nose only, the most common type. This type is called NCPAP. The N stands for \"nasal.\"  Why is it done? CPAP is usually the best treatment for obstructive sleep apnea. It is the first treatment choice and the most widely used. Your doctor may suggest CPAP if you have: Moderate to severe sleep apnea. Sleep apnea and coronary artery disease (CAD) or heart failure. How does it help? CPAP can help you have more normal sleep, so you feel less sleepy and more alert during the daytime. CPAP may help keep heart failure or other heart problems from getting worse. NCPAP may help lower your blood pressure. If you use CPAP, your bed partner may also sleep better because you are not snoring or restless. What are the side effects? Some people who use CPAP have:  A dry or stuffy nose and a sore throat. Irritated skin on the face. Sore eyes. Bloating. If you have any of these problems, work with your doctor to fix them. Here are some things you can try:  Be sure the mask or nasal prongs fit well. See if your doctor can adjust the pressure of your CPAP. If your nose is dry, try a humidifier.   If your nose is runny or stuffy, try decongestant medicine or a steroid nasal spray. If these things do not help, you might try a different type of machine. Some machines have air pressure that adjusts on its own. Others have air pressures that are different when you breathe in than when you breathe out. This may reduce discomfort caused by too much pressure in your nose. Where can you learn more? Go to Vital [email or web address].be  Enter Alexandro Espinosa in the search box to learn more about \"Learning About CPAP for Sleep Apnea. \"   © 4095-4230 Healthwise, Incorporated. Care instructions adapted under license by [facility] (which disclaims liability or warranty for this information). This care instruction is for use with your licensed healthcare professional. If you have questions about a medical condition or this instruction, always ask your healthcare professional. [address] any warranty or liability for your use of this information. Content Version: 9.4.64692; Last Revised: January 11, 2010  PROPER SLEEP HYGIENE    What to avoid  Do not have drinks with caffeine, such as coffee or black tea, for 8 hours before bed. Do not smoke or use other types of tobacco near bedtime. Nicotine is a stimulant and can keep you awake. Avoid drinking alcohol late in the evening, because it can cause you to wake in the middle of the night. Do not eat a big meal close to bedtime. If you are hungry, eat a light snack. Do not drink a lot of water close to bedtime, because the need to urinate may wake you up during the night. Do not read or watch TV in bed. Use the bed only for sleeping and sexual activity. What to try  Go to bed at the same time every night, and wake up at the same time every morning. Do not take naps during the day. Keep your bedroom quiet, dark, and cool. Get regular exercise, but not within 3 to 4 hours of your bedtime. .  Sleep on a comfortable pillow and mattress.   If watching the clock makes you anxious, turn it facing away from you so you cannot see the time. If you worry when you lie down, start a worry book. Well before bedtime, write down your worries, and then set the book and your concerns aside. Try meditation or other relaxation techniques before you go to bed. If you cannot fall asleep, get up and go to another room until you feel sleepy. Do something relaxing. Repeat your bedtime routine before you go to bed again. Make your house quiet and calm about an hour before bedtime. Turn down the lights, turn off the TV, log off the computer, and turn down the volume on music. This can help you relax after a busy day. Drowsy Driving: The [facility] 54 cites drowsiness as a causing factor in more than 352,361 police reported crashes annually, resulting in 76,000 injuries and 1,500 deaths. Other surveys suggest 55% of people polled have driven while drowsy in the past year, 23% had fallen asleep but not crashed, 3% crashed, and 2% had and accident due to drowsy driving. Who is at risk? Young Drivers: One study of drowsy driving accidents states that 55% of the drivers were under 25 years. Of those, 75% were male. Shift Workers and Travelers: People who work overnight or travel across time zones frequently are at higher risk of experiencing Circadian Rhythm Disorders. They are trying to work and function when their body is programed to sleep. Sleep Deprived: Lack of sleep has a serious impact on your ability to pay attention or focus on a task. Consistently getting less than the average of 8 hours your body needs creates partial or cumulative sleep deprivation. Untreated Sleep Disorders: Sleep Apnea, Narcolepsy, R.L.S., and other sleep disorders (untreated) prevent a person from getting enough restful sleep. This leads to excessive daytime sleepiness and increases the risk for drowsy driving accidents by up to 7 times.   Medications / Alcohol: Even over the counter medications can cause drowsiness. Medications that impair a drivers attention should have a warning label. Alcohol naturally makes you sleepy and on its own can cause accidents. Combined with excessive drowsiness its effects are amplified. Signs of Drowsy Driving:   * You don't remember driving the last few miles   * You may drift out of your corie   * You are unable to focus and your thoughts wander   * You may yawn more often than normal   * You have difficulty keeping your eyes open / nodding off   * Missing traffic signs, speeding, or tailgating  Prevention-   Good sleep hygiene, lifestyle and behavioral choices have the most impact on drowsy driving. There is no substitute for sleep and the average person requires 8 hours nightly. If you find yourself driving drowsy, stop and sleep. Consider the sleep hygiene tips provided during your visit as well. Medication Refill Policy: Refills for all medications require 1 week advance notice. Please have your pharmacy fax a refill request. We are unable to fax, or call in \"controled substance\" medications and you will need to pick these prescriptions up from our office. [employer] Activation    Thank you for requesting access to [employer]. Please follow the instructions below to securely access and download your online medical record. [employer] allows you to send messages to your doctor, view your test results, renew your prescriptions, schedule appointments, and more. How Do I Sign Up? In your internet browser, go to [email or web address]. Click on the First Time User? Click Here link in the Sign In box. You will see the New Member Sign Up page. Enter your [employer] Access Code exactly as it appears below. You will not need to use this code after youve completed the sign-up process. If you do not sign up before the expiration date, you must request a new code. [employer] Access Code:  Activation code not generated  Current R[employer] Books Status: Active (This is the date your [employer] access code will )    Enter the last four digits of your Social Security Number (xxxx) and Date of Birth (mm/dd/yyyy) as indicated and click Submit. You will be taken to the next sign-up page. Create a [email or web address] ID. This will be your [employer] login ID and cannot be changed, so think of one that is secure and easy to remember. Create a [employer] password. You can change your password at any time. Enter your Password Reset Question and Answer. This can be used at a later time if you forget your password. Enter your e-mail address. You will receive e-mail notification when new information is available in [employer]. Click Sign Up. You can now view and download portions of your medical record. Click the [employer] link to download a portable copy of your medical information. Additional Information    If you have questions, please call [phone]. Remember, [employer] is NOT to be used for urgent needs. For medical emergencies, dial 911.

## 2022-11-22 ENCOUNTER — PATIENT MESSAGE (OUTPATIENT)
Dept: SLEEP MEDICINE | Age: 82
End: 2022-11-22

## 2022-11-22 ENCOUNTER — OFFICE VISIT (OUTPATIENT)
Dept: SLEEP MEDICINE | Age: 82
End: 2022-11-22

## 2022-11-22 DIAGNOSIS — G47.33 OSA (OBSTRUCTIVE SLEEP APNEA): Primary | ICD-10-CM

## 2022-12-15 ENCOUNTER — TELEPHONE (OUTPATIENT)
Dept: FAMILY MEDICINE CLINIC | Age: 82
End: 2022-12-15

## 2022-12-15 NOTE — TELEPHONE ENCOUNTER
Pt in need of Bydureon Pen.  Advised pt that request has been sent to PCP and will address with him when he returns Friday am.

## 2023-01-09 ENCOUNTER — OFFICE VISIT (OUTPATIENT)
Dept: FAMILY MEDICINE CLINIC | Age: 83
End: 2023-01-09
Payer: MEDICARE

## 2023-01-09 VITALS
SYSTOLIC BLOOD PRESSURE: 138 MMHG | HEART RATE: 73 BPM | RESPIRATION RATE: 18 BRPM | DIASTOLIC BLOOD PRESSURE: 72 MMHG | TEMPERATURE: 97.1 F | WEIGHT: 163.8 LBS | HEIGHT: 62 IN | BODY MASS INDEX: 30.14 KG/M2 | OXYGEN SATURATION: 96 %

## 2023-01-09 DIAGNOSIS — E78.00 PURE HYPERCHOLESTEROLEMIA: ICD-10-CM

## 2023-01-09 DIAGNOSIS — M17.0 PRIMARY OSTEOARTHRITIS OF BOTH KNEES: ICD-10-CM

## 2023-01-09 DIAGNOSIS — I10 ESSENTIAL HYPERTENSION: ICD-10-CM

## 2023-01-09 DIAGNOSIS — E11.21 TYPE 2 DIABETES MELLITUS WITH DIABETIC NEPHROPATHY, WITHOUT LONG-TERM CURRENT USE OF INSULIN (HCC): Primary | ICD-10-CM

## 2023-01-09 PROCEDURE — 1101F PT FALLS ASSESS-DOCD LE1/YR: CPT | Performed by: FAMILY MEDICINE

## 2023-01-09 PROCEDURE — 3075F SYST BP GE 130 - 139MM HG: CPT | Performed by: FAMILY MEDICINE

## 2023-01-09 PROCEDURE — 3078F DIAST BP <80 MM HG: CPT | Performed by: FAMILY MEDICINE

## 2023-01-09 PROCEDURE — G8399 PT W/DXA RESULTS DOCUMENT: HCPCS | Performed by: FAMILY MEDICINE

## 2023-01-09 PROCEDURE — G8510 SCR DEP NEG, NO PLAN REQD: HCPCS | Performed by: FAMILY MEDICINE

## 2023-01-09 PROCEDURE — G8417 CALC BMI ABV UP PARAM F/U: HCPCS | Performed by: FAMILY MEDICINE

## 2023-01-09 PROCEDURE — 1123F ACP DISCUSS/DSCN MKR DOCD: CPT | Performed by: FAMILY MEDICINE

## 2023-01-09 PROCEDURE — 99214 OFFICE O/P EST MOD 30 MIN: CPT | Performed by: FAMILY MEDICINE

## 2023-01-09 PROCEDURE — G8536 NO DOC ELDER MAL SCRN: HCPCS | Performed by: FAMILY MEDICINE

## 2023-01-09 PROCEDURE — 1090F PRES/ABSN URINE INCON ASSESS: CPT | Performed by: FAMILY MEDICINE

## 2023-01-09 PROCEDURE — G8427 DOCREV CUR MEDS BY ELIG CLIN: HCPCS | Performed by: FAMILY MEDICINE

## 2023-01-09 RX ORDER — LOSARTAN POTASSIUM 100 MG/1
TABLET ORAL
Qty: 90 TABLET | Refills: 3 | Status: SHIPPED | OUTPATIENT
Start: 2023-02-08

## 2023-01-09 RX ORDER — ATORVASTATIN CALCIUM 20 MG/1
20 TABLET, FILM COATED ORAL DAILY
Qty: 90 TABLET | Refills: 3 | Status: SHIPPED | OUTPATIENT
Start: 2023-01-09

## 2023-01-09 NOTE — PROGRESS NOTES
Subjective:   Hypertension and Diabetes    HPI:    Diabetes. Sugars controlled well on last check on metformin and bydureon 1 inj/wk. Weight about the same. Hypoglycemia: none. Tolerating current treatment well. Current medications include metformin ER 2000mg/d and bydureon bcise weekly. Lab Results   Component Value Date/Time    Hemoglobin A1c 7.0 (H) 09/21/2022 12:28 PM    Hemoglobin A1c 7.1 (H) 06/10/2022 11:17 AM    Hemoglobin A1c 7.4 (H) 03/09/2022 11:45 AM    Glucose 150 (H) 09/21/2022 12:28 PM    Glucose (POC) 128 (H) 02/22/2017 01:22 PM    Microalbumin/Creat ratio (mg/g creat) 18 09/21/2022 12:28 PM    Microalbumin,urine random 2.41 09/21/2022 12:28 PM    Microalbumin/creat ratio (POC) <30 05/07/2018 02:34 PM    LDL, calculated 36.2 09/21/2022 12:28 PM    Creatinine 0.71 09/21/2022 12:28 PM     Last eye exam performed last year with Dr. Abhinav Pressley of[facility] in [facility], no DR. Pt plans to make recheck appt soon. Hypertension. Blood pressures in goal. Management at last visit included con't current tx. Current regimen: angiotensin II receptor antagonist. Symptoms include no symptoms. Patient denies chest pain, palpitations, peripheral edema. Lab review:   Lab Results   Component Value Date/Time    Sodium 141 09/21/2022 12:28 PM    Potassium 4.4 09/21/2022 12:28 PM    Chloride 107 09/21/2022 12:28 PM    CO2 32 09/21/2022 12:28 PM    Anion gap 2 (L) 09/21/2022 12:28 PM    Glucose 150 (H) 09/21/2022 12:28 PM    BUN 17 09/21/2022 12:28 PM    Creatinine 0.71 09/21/2022 12:28 PM    BUN/Creatinine ratio 24 (H) 09/21/2022 12:28 PM    GFR est AA >60 09/21/2022 12:28 PM    GFR est non-AA >60 09/21/2022 12:28 PM    Calcium 10.8 (H) 09/21/2022 12:28 PM     Hyperlipidemia. On lipitor 20. Sommer well. No myalgias, arthralgias, unusual weakness.   Lab Results   Component Value Date/Time    Cholesterol, total 153 09/21/2022 12:28 PM    HDL Cholesterol 61 09/21/2022 12:28 PM    LDL, calculated 36.2 09/21/2022 12:28 PM VLDL, calculated 55.8 09/21/2022 12:28 PM    Triglyceride 279 (H) 09/21/2022 12:28 PM    CHOL/HDL Ratio 2.5 09/21/2022 12:28 PM     Lab Results   Component Value Date/Time    ALT (SGPT) 18 09/21/2022 12:28 PM    Alk. phosphatase 100 09/21/2022 12:28 PM    Bilirubin, total 0.3 09/21/2022 12:28 PM       Hand arthritis  Doing better lately with rest, activity mod. PMH, SH, Medications/Allergies: reviewed, on chart. Current Outpatient Medications   Medication Sig    Bydureon BCise 2 mg/0.85 mL atIn INJECT 1 INJECTION WEEKLY FOR DIABETES    metFORMIN ER (GLUCOPHAGE XR) 500 mg tablet TAKE TWO TABLETS BY MOUTH TWO TIMES A DAY FOR TYPE 2 DIABETES MELLITUS    levocetirizine (XYZAL) 5 mg tablet Take 5 mg by mouth daily. mometasone (ELOCON) 0.1 % topical cream AAA BID prn itching bumps    predniSONE (DELTASONE) 20 mg tablet 3 po qdx4 d, then 2 po qdx4 d then 1 po qdx4 for rash. glimepiride (AMARYL) 1 mg tablet Take 1 Tablet by mouth daily as needed (sugar >200). atorvastatin (LIPITOR) 20 mg tablet TAKE 1 TABLET BY MOUTH DAILY TO PREVENT CARDIAC DISEASE    losartan (COZAAR) 100 mg tablet TAKE 1 TABLET DAILY FOR PRESSURE    clotrimazole (LOTRIMIN) 1 % topical cream Apply  to affected area two (2) times a day. AMOXICILLIN PO Take  by mouth. Takes 4 tabs prior to dental work     No current facility-administered medications for this visit.       Allergies   Allergen Reactions    Ace Inhibitors Unable to Obtain    Antihist [Diphenhydramine Hcl] Hives    Diphenhydramine Hives    Zithromax [Azithromycin] Itching       ROS:  Constitutional: No fever, chills or abnormal weight loss  Respiratory: No cough, SOB   CV: No chest pain or Palpitations      Objective:     Visit Vitals  /72   Pulse 73   Temp 97.1 °F (36.2 °C) (Temporal)   Resp 18   Ht 5' 2\" (1.575 m)   Wt 163 lb 12.8 oz (74.3 kg)   SpO2 96%   BMI 29.96 kg/m²     -2#  Wt Readings from Last 3 Encounters:   01/09/23 163 lb 12.8 oz (74.3 kg)   11/17/22 165 lb (74.8 kg)   09/21/22 164 lb 6.4 oz (74.6 kg)     BP Readings from Last 3 Encounters:   01/09/23 138/72   11/17/22 (!) 140/78   09/21/22 128/64     Physical Examination: General appearance - alert, well appearing, and in no distress  Mental status - alert, oriented to person, place, and time  Eyes - pupils equal and reactive, extraocular eye movements intact  ENT - bilateral external ears with heavy cerumen impaction. Ext nose normal. Normal lips  Neck - supple, no significant adenopathy, no thyromegaly or mass  Lymphatics - no palpable lymphadenopathy, no hepatosplenomegaly  Chest - clear to auscultation, no wheezes, rales or rhonchi, symmetric air entry  Heart - normal rate, regular rhythm, normal S1, S2, no murmurs, rubs, clicks or gallops  Extremities - peripheral pulses normal, no pedal edema, no clubbing or cyanosis. Bilat hands with heberden's nodes. Assessment & Plan:     DM2  Doing well on bydureon 1 inj/week and metformin, yulissa well. Check a1c, BMP. If A1c still in the 6's, plan check q6mo. HTN  Good today. monitor. Con't current tx for now and recheck lytes, GFR in BMP     HLD  Well controlled. Plan recheck labs fall, adjust PRN, con't current tx if in goal.    DJD  Hands better. Knees doing worse, with decreased function. Set up with [facility] for Phys therapy, use grab bar for tub. Flu shot UTD. Wants results by letter. F/U 6mo/PRN.

## 2023-01-09 NOTE — PROGRESS NOTES
Mike Tucker is a 80 y.o. female presenting for/with:    Chief Complaint   Patient presents with    Hypertension    Diabetes       Visit Vitals  /72   Pulse 73   Temp 97.1 °F (36.2 °C) (Temporal)   Resp 18   Ht 5' 2\" (1.575 m)   Wt 163 lb 12.8 oz (74.3 kg)   SpO2 96%   BMI 29.96 kg/m²     Pain Scale: /10  Pain Location:     1. \"Have you been to the ER, urgent care clinic since your last visit? Hospitalized since your last visit? \" No    2. \"Have you seen or consulted any other health care providers outside of the [address] [place] [address] since your last visit? \" No     3. For patients aged 39-70: Has the patient had a colonoscopy / FIT/ Cologuard? NA - based on age      If the patient is female:    4. For patients aged 41-77: Has the patient had a mammogram within the past 2 years? NA - based on age or sex      11. For patients aged 21-65: Has the patient had a pap smear? NA - based on age or sex      Symptom review:  Learning Assessment 5/12/2021   PRIMARY LEARNER Patient   PRIMARY LANGUAGE ENGLISH   LEARNER PREFERENCE PRIMARY READING   ANSWERED BY patient   RELATIONSHIP SELF     Fall Risk Assessment, last 12 mths 1/9/2023   Able to walk? Yes   Fall in past 12 months? 0   Do you feel unsteady? 0   Are you worried about falling 0   Number of falls in past 12 months -   Fall with injury? -       3 most recent PHQ Screens 1/9/2023   PHQ Not Done -   Little interest or pleasure in doing things Several days   Feeling down, depressed, irritable, or hopeless Several days   Total Score PHQ 2 2     Abuse Screening Questionnaire 1/9/2023   Do you ever feel afraid of your partner? N   Are you in a relationship with someone who physically or mentally threatens you? N   Is it safe for you to go home?  Y       ADL Assessment 1/9/2023   Feeding yourself No Help Needed   Getting from bed to chair No Help Needed   Getting dressed No Help Needed   Bathing or showering No Help Needed   Walk across the room (includes cane/walker) No Help Needed   Using the telphone No Help Needed   Taking your medications No Help Needed   Preparing meals No Help Needed   Managing money (expenses/bills) No Help Needed   Moderately strenuous housework (laundry) No Help Needed   Shopping for personal items (toiletries/medicines) No Help Needed   Shopping for groceries No Help Needed   Driving No Help Needed   Climbing a flight of stairs No Help Needed   Getting to places beyond walking distances No Help Needed      Advance Care Planning 1/9/2023   Patient's Healthcare Decision Maker is: Named in scanned ACP document   Primary Decision Maker Name -   Primary Decision Maker Phone Number -   Primary Decision Maker Relationship to Patient -   Confirm Advance Directive Yes, on file   Does the patient have other document types -

## 2023-01-09 NOTE — PATIENT INSTRUCTIONS
Short arc 10 reps twice daily for 1 wk, then 20 reps twice daily for 3wk  Sit back on the chair with knee just past edge of chair. Support heel on short block ~6\" tall (2 big phone books). Straighten knee and replace for 1 rep. Straight leg raise 10 reps twice daily for 1 wk, then 20 reps twice daily for 3wk  Sit with buttock on edge of seat. Support heel on short block ~6\" tall (2 big phone books). Lift about 4\" and replace for 1 rep.

## 2023-01-10 LAB
ANION GAP SERPL CALC-SCNC: 6 MMOL/L (ref 5–15)
BUN SERPL-MCNC: 20 MG/DL (ref 6–20)
BUN/CREAT SERPL: 29 (ref 12–20)
CALCIUM SERPL-MCNC: 10.9 MG/DL (ref 8.5–10.1)
CHLORIDE SERPL-SCNC: 109 MMOL/L (ref 97–108)
CO2 SERPL-SCNC: 28 MMOL/L (ref 21–32)
CREAT SERPL-MCNC: 0.69 MG/DL (ref 0.55–1.02)
EST. AVERAGE GLUCOSE BLD GHB EST-MCNC: 154 MG/DL
GLUCOSE SERPL-MCNC: 115 MG/DL (ref 65–100)
HBA1C MFR BLD: 7 % (ref 4–5.6)
POTASSIUM SERPL-SCNC: 4.5 MMOL/L (ref 3.5–5.1)
SODIUM SERPL-SCNC: 143 MMOL/L (ref 136–145)

## 2023-01-19 ENCOUNTER — OFFICE VISIT (OUTPATIENT)
Dept: SLEEP MEDICINE | Age: 83
End: 2023-01-19
Payer: MEDICARE

## 2023-01-19 VITALS
BODY MASS INDEX: 30 KG/M2 | OXYGEN SATURATION: 95 % | HEART RATE: 80 BPM | DIASTOLIC BLOOD PRESSURE: 72 MMHG | WEIGHT: 163 LBS | HEIGHT: 62 IN | SYSTOLIC BLOOD PRESSURE: 144 MMHG

## 2023-01-19 DIAGNOSIS — G47.33 OSA (OBSTRUCTIVE SLEEP APNEA): Primary | ICD-10-CM

## 2023-01-19 NOTE — PATIENT INSTRUCTIONS
217 [facility] Michele. [place], [address]  Tel.  [phone]  Fax. 100 [facility] 60  [place], [address] [facility]  Tel.  [phone]  Fax. [phone] [address]  Tel.  [phone]  Fax. [phone]     Learning About CPAP for Sleep Apnea  What is CPAP? CPAP is a small machine that you use at home every night while you sleep. It increases air pressure in your throat to keep your airway open. When you have sleep apnea, this can help you sleep better so you feel much better. CPAP stands for \"continuous positive airway pressure. \"  The CPAP machine will have one of the following:  A mask that covers your nose and mouth  Prongs that fit into your nose  A mask that covers your nose only, the most common type. This type is called NCPAP. The N stands for \"nasal.\"  Why is it done? CPAP is usually the best treatment for obstructive sleep apnea. It is the first treatment choice and the most widely used. Your doctor may suggest CPAP if you have: Moderate to severe sleep apnea. Sleep apnea and coronary artery disease (CAD) or heart failure. How does it help? CPAP can help you have more normal sleep, so you feel less sleepy and more alert during the daytime. CPAP may help keep heart failure or other heart problems from getting worse. NCPAP may help lower your blood pressure. If you use CPAP, your bed partner may also sleep better because you are not snoring or restless. What are the side effects? Some people who use CPAP have:  A dry or stuffy nose and a sore throat. Irritated skin on the face. Sore eyes. Bloating. If you have any of these problems, work with your doctor to fix them. Here are some things you can try:  Be sure the mask or nasal prongs fit well. See if your doctor can adjust the pressure of your CPAP. If your nose is dry, try a humidifier.   If your nose is runny or stuffy, try decongestant medicine or a steroid nasal spray. If these things do not help, you might try a different type of machine. Some machines have air pressure that adjusts on its own. Others have air pressures that are different when you breathe in than when you breathe out. This may reduce discomfort caused by too much pressure in your nose. Where can you learn more? Go to [employer]  Enter Anthill Fillers in the search box to learn more about \"Learning About CPAP for Sleep Apnea. \"   © 6845-1254 Healthwise, Incorporated. Care instructions adapted under license by [employer] (which disclaims liability or warranty for this information). This care instruction is for use with your licensed healthcare professional. If you have questions about a medical condition or this instruction, always ask your healthcare professional. [address] any warranty or liability for your use of this information. Content Version: 6.4.53826; Last Revised: January 11, 2010  PROPER SLEEP HYGIENE    What to avoid  Do not have drinks with caffeine, such as coffee or black tea, for 8 hours before bed. Do not smoke or use other types of tobacco near bedtime. Nicotine is a stimulant and can keep you awake. Avoid drinking alcohol late in the evening, because it can cause you to wake in the middle of the night. Do not eat a big meal close to bedtime. If you are hungry, eat a light snack. Do not drink a lot of water close to bedtime, because the need to urinate may wake you up during the night. Do not read or watch TV in bed. Use the bed only for sleeping and sexual activity. What to try  Go to bed at the same time every night, and wake up at the same time every morning. Do not take naps during the day. Keep your bedroom quiet, dark, and cool. Get regular exercise, but not within 3 to 4 hours of your bedtime. .  Sleep on a comfortable pillow and mattress.   If watching the clock makes you anxious, turn it facing away from you so you cannot see the time. If you worry when you lie down, start a worry book. Well before bedtime, write down your worries, and then set the book and your concerns aside. Try meditation or other relaxation techniques before you go to bed. If you cannot fall asleep, get up and go to another room until you feel sleepy. Do something relaxing. Repeat your bedtime routine before you go to bed again. Make your house quiet and calm about an hour before bedtime. Turn down the lights, turn off the TV, log off the computer, and turn down the volume on music. This can help you relax after a busy day. Drowsy Driving: The [facility] cites drowsiness as a causing factor in more than 823,165 police reported crashes annually, resulting in 76,000 injuries and 1,500 deaths. Other surveys suggest 55% of people polled have driven while drowsy in the past year, 23% had fallen asleep but not crashed, 3% crashed, and 2% had and accident due to drowsy driving. Who is at risk? Young Drivers: One study of drowsy driving accidents states that 55% of the drivers were under 25 years. Of those, 75% were male. Shift Workers and Travelers: People who work overnight or travel across time zones frequently are at higher risk of experiencing Circadian Rhythm Disorders. They are trying to work and function when their body is programed to sleep. Sleep Deprived: Lack of sleep has a serious impact on your ability to pay attention or focus on a task. Consistently getting less than the average of 8 hours your body needs creates partial or cumulative sleep deprivation. Untreated Sleep Disorders: Sleep Apnea, Narcolepsy, R.L.S., and other sleep disorders (untreated) prevent a person from getting enough restful sleep. This leads to excessive daytime sleepiness and increases the risk for drowsy driving accidents by up to 7 times.   Medications / Alcohol: Even over the counter medications can cause drowsiness. Medications that impair a drivers attention should have a warning label. Alcohol naturally makes you sleepy and on its own can cause accidents. Combined with excessive drowsiness its effects are amplified. Signs of Drowsy Driving:   * You don't remember driving the last few miles   * You may drift out of your corie   * You are unable to focus and your thoughts wander   * You may yawn more often than normal   * You have difficulty keeping your eyes open / nodding off   * Missing traffic signs, speeding, or tailgating  Prevention-   Good sleep hygiene, lifestyle and behavioral choices have the most impact on drowsy driving. There is no substitute for sleep and the average person requires 8 hours nightly. If you find yourself driving drowsy, stop and sleep. Consider the sleep hygiene tips provided during your visit as well. Medication Refill Policy: Refills for all medications require 1 week advance notice. Please have your pharmacy fax a refill request. We are unable to fax, or call in \"controled substance\" medications and you will need to pick these prescriptions up from our office. [employer] Activation    Thank you for requesting access to [employer]. Please follow the instructions below to securely access and download your online medical record. [employer] allows you to send messages to your doctor, view your test results, renew your prescriptions, schedule appointments, and more. How Do I Sign Up? In your internet browser, go to [email or web address]. Click on the First Time User? Click Here link in the Sign In box. You will see the New Member Sign Up page. Enter your [employer] Access Code exactly as it appears below. You will not need to use this code after youve completed the sign-up process. If you do not sign up before the expiration date, you must request a new code. [employer] Access Code:  Activation code not generated  Current [employer] Status: Active (This is the date your 6APT access code will )    Enter the last four digits of your Social Security Number (xxxx) and Date of Birth (mm/dd/yyyy) as indicated and click Submit. You will be taken to the next sign-up page. Create a 6APT ID. This will be your 6APT login ID and cannot be changed, so think of one that is secure and easy to remember. Create a 6APT password. You can change your password at any time. Enter your Password Reset Question and Answer. This can be used at a later time if you forget your password. Enter your e-mail address. You will receive e-mail notification when new information is available in [address] Click Sign Up. You can now view and download portions of your medical record. Click the Solutionary link to download a portable copy of your medical information. Additional Information    If you have questions, please call [phone]. Remember, 6APT is NOT to be used for urgent needs. For medical emergencies, dial 911.

## 2023-01-19 NOTE — PROGRESS NOTES
217 [facility]., Michele. [place], [address]   Tel.  [phone]   Fax. 100 [facility] 60   [place], [address] [facility]   Tel.  [phone]   Fax. [phone] [address]   Tel.  [phone]   Fax. 196 [address] (: 1940) is a 80 y.o. female, established patient, seen for positive airway pressure follow-up and evaluation. She was last seen by me on 2022, prior notes and sleep tests reviewed in detail. Previous sleep apnea diagnosis 15+ years ago treated with PAP. Most recent sleep testing completed 2015 at [facility], copies provided by patient show in lab split sleep test with Avg AHI 41/hr with a lowest SpO2 of 83%, effectively treated at a CPAP pressure of 10 cmH2O. [employer] (System One series 60) device set up 2015. ASSESSMENT/PLAN:    ICD-10-CM ICD-9-CM    1. FRANKIE (obstructive sleep apnea)  G47.33 327.23 AMB SUPPLY ORDER      AMB SUPPLY ORDER      2. Adult BMI 29.0-29.9 kg/sq m  Z68.29 V85.25           AHI: 41(2015). External Lab. Resmed A11 set up 2022 - serial # [id number]     Previously On Respironics CPAP: 10 cm H2O Set up 2015. Serial # [id number]    She is adherent with PAP therapy and PAP continues to benefit patient and remains necessary for control of her sleep apnea. 3B device initially sent by DME, then Resmed sent (per original order), device boxes not opened until visit 2022 - 3B returned. Follow-up and Dispositions    Return in about 6 months (around 2023) for 6 month follow up . Sleep Apnea -    Sleep apnea treatment is causing negative side effects, new device is running our of water causing awakenings and dryness. Change in treatment plan is needed. Change humidity settings to  manual 2. Changes made by provider in [employer] system and sent to [facility].   If this does not resolve issue, additional humidifier in bedroom may help. Continue on current pressures    * Supplies ordered - nasal pillows mask and heated tubing. Patient has not received replacement supplies yet - DME to be contacted about sending. Orders Placed This Encounter    AMB SUPPLY ORDER     Diagnosis: (G47.33) FRANKIE (obstructive sleep apnea)  (primary encounter diagnosis)     Replacement Supplies for Positive Airway Pressure Therapy Device:   Duration of need: 99 months.  Nasal Pillows (Replace) 2 per month. P10 for her small   Nasal Interface Mask 1 every 3 months.  Pos Airway pressure chin strap   Headgear 1 every 6 months.  Tubing with heating element 1 every 3 months.  Filter(s) Disposable 2 per month.  Filter(s) Non-Disposable 1 every 6 months. .   [address] for Humidifier (Replace) 1 every 6 months. HALEY Ernst; NPI: [id number]    Electronically signed. Date:- 01/19/23    AMB SUPPLY ORDER     Diagnosis: (G47.33) FRANKIE (obstructive sleep apnea)  (primary encounter diagnosis)     Replacement Supplies for Positive Airway Pressure Therapy Device:   Duration of need: 99 months.  Nasal Pillows (Replace) 2 per month. Resmed p10 for her - small   Nasal Interface Mask 1 every 3 months.  Pos Airway pressure chin strap   Headgear 1 every 6 months.  Tubing with heating element 1 every 3 months.  Filter(s) Disposable 2 per month.  Filter(s) Non-Disposable 1 every 6 months. .   [address] for Humidifier (Replace) 1 every 6 months. HALEY Ernst; NPI: [id number]    Electronically signed. Date:- 01/20/23       * Counseling was provided regarding the importance of regular PAP use with emphasis on ensuring sufficient total sleep time, proper sleep hygiene, and safe driving. * Re-enforced proper and regular cleaning for the device.     * She was asked to contact our office for any problems regarding PAP therapy. 2. Encouraged continued weight management program through appropriate diet and exercise regimen as significant weight reduction has been shown to reduce severity of obstructive sleep apnea. SUBJECTIVE/OBJECTIVE:    She  is seen today for follow up on PAP device and reports some problems using the device. The following concerns identified:    Drowsiness no Problems exhaling no   Snoring no Forget to put on no   Mask Comfortable yes Can't fall asleep no   Dry Mouth Yes, if water runs dry Mask falls off no   Air Leaking no Frequent awakenings no       She admits that her sleep has improved on PAP therapy using nasal pillows mask and heated tubing. She reports problems with the humidifier tank running dry and having to get up to refill it nightly but otherwise she is sleeping much better through the night and is ok with the new resmed device. She recently started sleeping with an eye mask to reduce light and finds this is very helpful and her sleep quality has improved with the new device and eye mask    Review of device download indicated:  Auto pressure: 9-12 cmH2O; Max Pressure: 11.7 cmH2O;  95th percentile Pressure: 11.1 cmH2O   95th Percentile Leak: 23.9 L/Min  % Used Days >= 4 hours: 97.  Avg hours used:  10:07. Therapy Apnea Index averaged over PAP use: 0.5 /hr which reflects improved sleep breathing condition. We reviewed in detail her prior testing and new device data that shows the improvement in apnea events with the device. Griswold Sleepiness Score: 11 and Modified F.O.S.Q. Score Total / 2: 16     Sleep Review of Systems: notable for Negative difficulty falling asleep; Positive awakenings at night (due to device running out of water); Negative early morning headaches; Negative memory problems; Negative concentration issues;  Negative chest pain; Negative shortness of breath; Negative significant joint pain at night; Negative significant muscle pain at night; Negative rashes or itching; Negative heartburn; Negative significant mood issues; daily afternoon naps      Visit Vitals  BP (!) 144/72 (BP 1 Location: Left arm, BP Patient Position: Sitting)   Pulse 80   Ht 5' 2\" (1.575 m)   Wt 163 lb (73.9 kg)   SpO2 95%   BMI 29.81 kg/m²          General:   Alert, oriented, not in acute distress   Eyes:  Anicteric Sclerae; no obvious strabismus   Nose:  No obvious nasal septum deviation    Neck:   Midline trachea   Chest/Lungs:  Symmetrical lung expansion, clear lung fields on auscultation    CVS:  Normal rate, regular rhythm,  no JVD   Extremities:  No obvious rashes, no edema    Neuro:  No focal deficits; No obvious tremor    Psych:  Normal affect,  normal countenance     Patient's phone number [phone] (home)  was reviewed and confirmed for accuracy. She gives permission for messages regarding results and appointments to be left at that number. On this date 01/19/2023 I have spent 30 minutes reviewing previous notes, test results and face to face with the patient discussing the diagnosis and importance of compliance with the treatment plan as well as documenting on the day of the visit. An electronic signature was used to authenticate this note.     -- Castillo Kelley NP, [facility]  01/20/23

## 2023-01-20 ENCOUNTER — DOCUMENTATION ONLY (OUTPATIENT)
Dept: SLEEP MEDICINE | Age: 83
End: 2023-01-20

## 2023-01-23 ENCOUNTER — TELEPHONE (OUTPATIENT)
Dept: SLEEP MEDICINE | Age: 83
End: 2023-01-23

## 2023-01-23 NOTE — TELEPHONE ENCOUNTER
Patient left message on [address] voicemail. States she was seen last week at [facility] and NP had humidification pressures adjusted. She requests to go back to old settings, c/o dry mouth and sore throat. Message was left on machine Friday, Jan 20th @ 440pm    Routing message to NP and Technologists for assistance. Returned patients call letting her know we got her vm and have sent providers a message for her- had to leave a message.

## 2023-01-24 NOTE — TELEPHONE ENCOUNTER
Geremias Bello (: 1940) last seen by me on 2022, prior notes and sleep tests reviewed in detail. Previous sleep apnea diagnosis 15+ years ago treated with PAP. Most recent sleep testing completed 2015 at [facility], copies provided by patient show in lab split sleep test with Avg AHI 41/hr with a lowest SpO2 of 83%, effectively treated at a CPAP pressure of 10 cmH2O. [employer] (System One series 60) device set up 2015. AHI: 41(2015). External Lab. Resmed A11 set up 2022 - serial # [id number]     Called patient back and she is doing better with water not running out before morning and although she is a little dry she has adjusted and is ok to leave current settings. She reports her husbands device has not come from [facility] yet. We will follow up with them for update.         Joanna Cash NP, [facility]  23

## 2023-02-02 ENCOUNTER — TELEPHONE (OUTPATIENT)
Dept: SLEEP MEDICINE | Age: 83
End: 2023-02-02

## 2023-02-02 NOTE — TELEPHONE ENCOUNTER
Spoke with patient's  to advise her that [employer] has sent out her nasal pillows and they will be in 3-5 business days. Call the office if they have not received them by the end of the week.

## 2023-02-09 ENCOUNTER — DOCUMENTATION ONLY (OUTPATIENT)
Dept: SLEEP MEDICINE | Age: 83
End: 2023-02-09

## 2023-02-09 NOTE — PROGRESS NOTES
Michelle Townsend (: 1940) last seen by me on 2023. Previous sleep apnea diagnosis 15+ years ago treated with PAP. Most recent sleep testing completed 2015 at [facility], copies provided by patient show in lab split sleep test with Avg AHI 41/hr with a lowest SpO2 of 83%, effectively treated at a CPAP pressure of 10 cmH2O. [email or web address] (System One series 60) device set up 2015. AHI: 41(2015). External Lab. Resmed A11 set up 2022 - serial # [id number]      Previously On Respironics CPAP: 10 cm H2O Set up 2015. Serial # [id number]    Spoke with patient 23, she is doing well on her device but still has not received her replacement supplies - message sent to [employer] Rowena Ormond) to follow up.     Shipping address is     [address] [place] [address], , [facility]  23

## 2023-02-14 ENCOUNTER — CLINICAL SUPPORT (OUTPATIENT)
Dept: FAMILY MEDICINE CLINIC | Age: 83
End: 2023-02-14

## 2023-02-14 ENCOUNTER — OFFICE VISIT (OUTPATIENT)
Dept: FAMILY MEDICINE CLINIC | Age: 83
End: 2023-02-14

## 2023-02-14 DIAGNOSIS — Z02.9 ADMINISTRATIVE ENCOUNTER: Primary | ICD-10-CM

## 2023-02-14 DIAGNOSIS — H61.23 BILATERAL IMPACTED CERUMEN: Primary | ICD-10-CM

## 2023-02-14 NOTE — PROGRESS NOTES
Subjective:     Mona Libman is a 80 y.o. female who presents for evaluation of a plugged ear. She has noticed both symptoms a few weeks ago. There is a prior history of cerumen impaction. Patient denies ear pain. Patient denies hearing loss. Objective: There were no vitals taken for this visit. General: alert, cooperative, no distress   Right Ear: ceruminosis noted. Left Ear:  ceruminosis noted. After removal: bilateral TM's and external ear canals normal, cerumen removed. Oropharynx:   normal.   Neck:  supple, symmetrical, trachea midline and no adenopathy. Assessment/Plan:     Cerumen Impaction, without otitis externa. Cerumen removed by flushing, currettage. Care instructions given. Home treatment: none  Followup as needed. current treatment plan is effective, no change in therapy.

## 2023-02-15 DIAGNOSIS — I10 ESSENTIAL HYPERTENSION: ICD-10-CM

## 2023-02-15 RX ORDER — LOSARTAN POTASSIUM 100 MG/1
TABLET ORAL
Qty: 90 TABLET | Refills: 2 | Status: SHIPPED | OUTPATIENT
Start: 2023-02-15

## 2023-07-03 ENCOUNTER — TELEPHONE (OUTPATIENT)
Age: 83
End: 2023-07-03

## 2023-07-03 NOTE — TELEPHONE ENCOUNTER
Called to confirm appt, pt stated she would like to reschedule at a later date to have appt on same day as  for his 6 month follow up.

## 2023-07-10 ENCOUNTER — OFFICE VISIT (OUTPATIENT)
Age: 83
End: 2023-07-10
Payer: MEDICARE

## 2023-07-10 VITALS
WEIGHT: 165.6 LBS | OXYGEN SATURATION: 96 % | BODY MASS INDEX: 30.47 KG/M2 | HEIGHT: 62 IN | TEMPERATURE: 97.5 F | DIASTOLIC BLOOD PRESSURE: 70 MMHG | RESPIRATION RATE: 18 BRPM | SYSTOLIC BLOOD PRESSURE: 138 MMHG | HEART RATE: 76 BPM

## 2023-07-10 DIAGNOSIS — I10 ESSENTIAL (PRIMARY) HYPERTENSION: ICD-10-CM

## 2023-07-10 DIAGNOSIS — E11.21 TYPE 2 DIABETES MELLITUS WITH DIABETIC NEPHROPATHY, WITHOUT LONG-TERM CURRENT USE OF INSULIN (HCC): ICD-10-CM

## 2023-07-10 DIAGNOSIS — E78.00 PURE HYPERCHOLESTEROLEMIA, UNSPECIFIED: ICD-10-CM

## 2023-07-10 DIAGNOSIS — Z00.00 MEDICARE ANNUAL WELLNESS VISIT, SUBSEQUENT: Primary | ICD-10-CM

## 2023-07-10 PROCEDURE — 1090F PRES/ABSN URINE INCON ASSESS: CPT | Performed by: FAMILY MEDICINE

## 2023-07-10 PROCEDURE — G8417 CALC BMI ABV UP PARAM F/U: HCPCS | Performed by: FAMILY MEDICINE

## 2023-07-10 PROCEDURE — 1036F TOBACCO NON-USER: CPT | Performed by: FAMILY MEDICINE

## 2023-07-10 PROCEDURE — G8400 PT W/DXA NO RESULTS DOC: HCPCS | Performed by: FAMILY MEDICINE

## 2023-07-10 PROCEDURE — 3051F HG A1C>EQUAL 7.0%<8.0%: CPT | Performed by: FAMILY MEDICINE

## 2023-07-10 PROCEDURE — 1123F ACP DISCUSS/DSCN MKR DOCD: CPT | Performed by: FAMILY MEDICINE

## 2023-07-10 PROCEDURE — 3078F DIAST BP <80 MM HG: CPT | Performed by: FAMILY MEDICINE

## 2023-07-10 PROCEDURE — G8427 DOCREV CUR MEDS BY ELIG CLIN: HCPCS | Performed by: FAMILY MEDICINE

## 2023-07-10 PROCEDURE — 3075F SYST BP GE 130 - 139MM HG: CPT | Performed by: FAMILY MEDICINE

## 2023-07-10 PROCEDURE — G0439 PPPS, SUBSEQ VISIT: HCPCS | Performed by: FAMILY MEDICINE

## 2023-07-10 PROCEDURE — 99214 OFFICE O/P EST MOD 30 MIN: CPT | Performed by: FAMILY MEDICINE

## 2023-07-10 SDOH — ECONOMIC STABILITY: HOUSING INSECURITY
IN THE LAST 12 MONTHS, WAS THERE A TIME WHEN YOU DID NOT HAVE A STEADY PLACE TO SLEEP OR SLEPT IN A SHELTER (INCLUDING NOW)?: NO

## 2023-07-10 SDOH — ECONOMIC STABILITY: INCOME INSECURITY: HOW HARD IS IT FOR YOU TO PAY FOR THE VERY BASICS LIKE FOOD, HOUSING, MEDICAL CARE, AND HEATING?: NOT HARD AT ALL

## 2023-07-10 SDOH — ECONOMIC STABILITY: FOOD INSECURITY: WITHIN THE PAST 12 MONTHS, YOU WORRIED THAT YOUR FOOD WOULD RUN OUT BEFORE YOU GOT MONEY TO BUY MORE.: NEVER TRUE

## 2023-07-10 SDOH — ECONOMIC STABILITY: FOOD INSECURITY: WITHIN THE PAST 12 MONTHS, THE FOOD YOU BOUGHT JUST DIDN'T LAST AND YOU DIDN'T HAVE MONEY TO GET MORE.: NEVER TRUE

## 2023-07-10 ASSESSMENT — LIFESTYLE VARIABLES
HOW MANY STANDARD DRINKS CONTAINING ALCOHOL DO YOU HAVE ON A TYPICAL DAY: PATIENT DOES NOT DRINK
HOW OFTEN DO YOU HAVE A DRINK CONTAINING ALCOHOL: NEVER
HOW MANY STANDARD DRINKS CONTAINING ALCOHOL DO YOU HAVE ON A TYPICAL DAY: PATIENT DOES NOT DRINK
HOW OFTEN DO YOU HAVE A DRINK CONTAINING ALCOHOL: NEVER

## 2023-07-10 ASSESSMENT — PATIENT HEALTH QUESTIONNAIRE - PHQ9
1. LITTLE INTEREST OR PLEASURE IN DOING THINGS: 0
SUM OF ALL RESPONSES TO PHQ QUESTIONS 1-9: 0
2. FEELING DOWN, DEPRESSED OR HOPELESS: 0
SUM OF ALL RESPONSES TO PHQ9 QUESTIONS 1 & 2: 0
SUM OF ALL RESPONSES TO PHQ QUESTIONS 1-9: 0

## 2023-07-10 NOTE — PROGRESS NOTES
Ricco Rivera is a 80 y.o. female  Chief Complaint   Patient presents with    Medicare AWV     Subjective:   Hypertension and Diabetes    HPI:  Diabetes. Sugars controlled well on last check on metformin and bydureon 1 inj/wk. Weight about the same. Hypoglycemia: none. Tolerating current treatment well. Current medications include metformin ER 2000mg/d and bydureon bcise weekly. Lab Results   Component Value Date    LABA1C 7.0 (H) 01/09/2023    LABA1C 7.0 (H) 09/21/2022    LABA1C 7.1 (H) 06/10/2022    GLUCOSE 115 (H) 01/09/2023    CREATININE 0.69 01/09/2023    LDLCALC 36.2 09/21/2022     Last eye exam performed last year with Dr. Mac anderson in [facility], no DR. Pt has cataract film, plans see Margie in AUG for laser tx. Hypertension. Blood pressures in goal. Management at last visit included con't current tx. Current regimen: angiotensin II receptor antagonist. Symptoms include no symptoms. Patient denies chest pain, palpitations, peripheral edema. Lab review:   Lab review:   Lab Results   Component Value Date     01/09/2023    K 4.5 01/09/2023     (H) 01/09/2023    CO2 28 01/09/2023    GLUCOSE 115 (H) 01/09/2023    BUN 20 01/09/2023    CREATININE 0.69 01/09/2023     Hyperlipidemia. On lipitor 20. Jose Martin well. No myalgias, arthralgias, unusual weakness. Lab Results   Component Value Date    CHOL 153 09/21/2022    HDL 61 09/21/2022    LDLCALC 36.2 09/21/2022    TRIG 279 (H) 09/21/2022    AST 8 (L) 09/21/2022    ALT 18 09/21/2022    ALKPHOS 100 09/21/2022    BILITOT 0.3 09/21/2022     Hand arthritis  Doing better lately with rest, activity mod. Reviewed PMH, PSH, SH, Medications, allergies (see chart).   Current Outpatient Medications   Medication Sig    AMOXICILLIN PO Take by mouth    atorvastatin (LIPITOR) 20 MG tablet Take 1 tablet by mouth daily    clotrimazole (LOTRIMIN) 1 % cream Apply topically 2 times daily    Exenatide ER (BYDUREON BCISE) 2 MG/0.85ML injection INJECT 1

## 2023-07-11 LAB
ALBUMIN SERPL-MCNC: 4 G/DL (ref 3.5–5)
ALBUMIN/GLOB SERPL: 1.5 (ref 1.1–2.2)
ALP SERPL-CCNC: 103 U/L (ref 45–117)
ALT SERPL-CCNC: 16 U/L (ref 12–78)
ANION GAP SERPL CALC-SCNC: 7 MMOL/L (ref 5–15)
AST SERPL-CCNC: 5 U/L (ref 15–37)
BILIRUB SERPL-MCNC: 0.3 MG/DL (ref 0.2–1)
BUN SERPL-MCNC: 18 MG/DL (ref 6–20)
BUN/CREAT SERPL: 28 (ref 12–20)
CALCIUM SERPL-MCNC: 10.5 MG/DL (ref 8.5–10.1)
CHLORIDE SERPL-SCNC: 105 MMOL/L (ref 97–108)
CHOLEST SERPL-MCNC: 148 MG/DL
CO2 SERPL-SCNC: 29 MMOL/L (ref 21–32)
CREAT SERPL-MCNC: 0.64 MG/DL (ref 0.55–1.02)
EST. AVERAGE GLUCOSE BLD GHB EST-MCNC: 157 MG/DL
GLOBULIN SER CALC-MCNC: 2.6 G/DL (ref 2–4)
GLUCOSE SERPL-MCNC: 109 MG/DL (ref 65–100)
HBA1C MFR BLD: 7.1 % (ref 4–5.6)
HDLC SERPL-MCNC: 59 MG/DL
HDLC SERPL: 2.5 (ref 0–5)
LDLC SERPL CALC-MCNC: 23.8 MG/DL (ref 0–100)
POTASSIUM SERPL-SCNC: 4.6 MMOL/L (ref 3.5–5.1)
PROT SERPL-MCNC: 6.6 G/DL (ref 6.4–8.2)
SODIUM SERPL-SCNC: 141 MMOL/L (ref 136–145)
TRIGL SERPL-MCNC: 326 MG/DL
VLDLC SERPL CALC-MCNC: 65.2 MG/DL

## 2023-10-09 ENCOUNTER — TELEPHONE (OUTPATIENT)
Age: 83
End: 2023-10-09

## 2023-10-09 NOTE — TELEPHONE ENCOUNTER
Received a VM on the lab extension from patient and her  regarding issues with their DME company. She has contacted Dave Dia and [employer] but has not received return calls. [employer] supplies, says undeliverable and patient and  do not receive the supplies/device, but Norbert Oropeza.

## 2023-10-09 NOTE — TELEPHONE ENCOUNTER
LVM with Kel Camacho and Portia Kelley with [employer] requesting a return call to discuss the situation.

## 2023-10-19 ENCOUNTER — TELEPHONE (OUTPATIENT)
Age: 83
End: 2023-10-19

## 2023-10-20 ENCOUNTER — TELEPHONE (OUTPATIENT)
Age: 83
End: 2023-10-20

## 2023-11-10 RX ORDER — METFORMIN HYDROCHLORIDE 500 MG/1
TABLET, EXTENDED RELEASE ORAL
Qty: 360 TABLET | Refills: 2 | Status: SHIPPED | OUTPATIENT
Start: 2023-11-10

## 2024-01-15 ENCOUNTER — OFFICE VISIT (OUTPATIENT)
Age: 84
End: 2024-01-15
Payer: MEDICARE

## 2024-01-15 VITALS
RESPIRATION RATE: 18 BRPM | SYSTOLIC BLOOD PRESSURE: 128 MMHG | HEIGHT: 62 IN | TEMPERATURE: 97.8 F | HEART RATE: 74 BPM | DIASTOLIC BLOOD PRESSURE: 62 MMHG | OXYGEN SATURATION: 97 % | BODY MASS INDEX: 31.06 KG/M2 | WEIGHT: 168.8 LBS

## 2024-01-15 DIAGNOSIS — Z23 NEEDS FLU SHOT: ICD-10-CM

## 2024-01-15 DIAGNOSIS — I10 PRIMARY HYPERTENSION: ICD-10-CM

## 2024-01-15 DIAGNOSIS — E11.21 TYPE 2 DIABETES MELLITUS WITH DIABETIC NEPHROPATHY, WITHOUT LONG-TERM CURRENT USE OF INSULIN (HCC): Primary | ICD-10-CM

## 2024-01-15 DIAGNOSIS — E78.2 MIXED HYPERLIPIDEMIA: ICD-10-CM

## 2024-01-15 PROCEDURE — 99214 OFFICE O/P EST MOD 30 MIN: CPT | Performed by: FAMILY MEDICINE

## 2024-01-15 PROCEDURE — 3078F DIAST BP <80 MM HG: CPT | Performed by: FAMILY MEDICINE

## 2024-01-15 PROCEDURE — 1090F PRES/ABSN URINE INCON ASSESS: CPT | Performed by: FAMILY MEDICINE

## 2024-01-15 PROCEDURE — G8417 CALC BMI ABV UP PARAM F/U: HCPCS | Performed by: FAMILY MEDICINE

## 2024-01-15 PROCEDURE — G8484 FLU IMMUNIZE NO ADMIN: HCPCS | Performed by: FAMILY MEDICINE

## 2024-01-15 PROCEDURE — 90694 VACC AIIV4 NO PRSRV 0.5ML IM: CPT | Performed by: FAMILY MEDICINE

## 2024-01-15 PROCEDURE — 1123F ACP DISCUSS/DSCN MKR DOCD: CPT | Performed by: FAMILY MEDICINE

## 2024-01-15 PROCEDURE — G8427 DOCREV CUR MEDS BY ELIG CLIN: HCPCS | Performed by: FAMILY MEDICINE

## 2024-01-15 PROCEDURE — 3074F SYST BP LT 130 MM HG: CPT | Performed by: FAMILY MEDICINE

## 2024-01-15 PROCEDURE — G8400 PT W/DXA NO RESULTS DOC: HCPCS | Performed by: FAMILY MEDICINE

## 2024-01-15 PROCEDURE — G0008 ADMIN INFLUENZA VIRUS VAC: HCPCS | Performed by: FAMILY MEDICINE

## 2024-01-15 PROCEDURE — 1036F TOBACCO NON-USER: CPT | Performed by: FAMILY MEDICINE

## 2024-01-15 RX ORDER — ATORVASTATIN CALCIUM 20 MG/1
20 TABLET, FILM COATED ORAL DAILY
Qty: 90 TABLET | Refills: 3 | Status: SHIPPED | OUTPATIENT
Start: 2024-01-15

## 2024-01-15 RX ORDER — LOSARTAN POTASSIUM 100 MG/1
TABLET ORAL
Qty: 90 TABLET | Refills: 3 | Status: SHIPPED | OUTPATIENT
Start: 2024-01-15

## 2024-01-15 RX ORDER — PEN NEEDLE, DIABETIC 30 GX3/16"
1 NEEDLE, DISPOSABLE MISCELLANEOUS WEEKLY
Qty: 50 EACH | Refills: 0 | Status: SHIPPED | OUTPATIENT
Start: 2024-01-15

## 2024-01-15 ASSESSMENT — PATIENT HEALTH QUESTIONNAIRE - PHQ9
SUM OF ALL RESPONSES TO PHQ QUESTIONS 1-9: 0
SUM OF ALL RESPONSES TO PHQ9 QUESTIONS 1 & 2: 0
2. FEELING DOWN, DEPRESSED OR HOPELESS: 0
SUM OF ALL RESPONSES TO PHQ QUESTIONS 1-9: 0
1. LITTLE INTEREST OR PLEASURE IN DOING THINGS: 0

## 2024-01-15 NOTE — PROGRESS NOTES
Katey Sterling is a 83 y.o. female presenting for/with:    Chief Complaint   Patient presents with    Hypertension    Diabetes    Medication Check       Vitals:    01/15/24 1300   BP: 128/62   Pulse: 74   Resp: 18   Temp: 97.8 °F (36.6 °C)   TempSrc: Temporal   SpO2: 97%   Weight: 76.6 kg (168 lb 12.8 oz)   Height: 1.575 m (5' 2\")       Pain Scale: 0 - No pain/10  Pain Location:     \"Have you been to the ER, urgent care clinic since your last visit?  Hospitalized since your last visit?\"    NO    “Have you seen or consulted any other health care providers outside of [facility] since your last visit?”    YES [facility]                 1/15/2024     1:09 PM   PHQ-9    Little interest or pleasure in doing things 0   Feeling down, depressed, or hopeless 0   PHQ-2 Score 0   PHQ-9 Total Score 0            No data to display                     1/15/2024     1:09 PM   Amb Fall Risk Assessment and TUG Test   Do you feel unsteady or are you worried about falling?  no   2 or more falls in past year? no   Fall with injury in past year? no           1/15/2024     1:00 PM 7/10/2023     1:00 PM   ADL ASSESSMENT   Feeding yourself No Help Needed No Help Needed   Getting from bed to chair No Help Needed No Help Needed   Getting dressed No Help Needed No Help Needed   Bathing or showering No Help Needed No Help Needed   Walk across the room (includes cane/walker) No Help Needed No Help Needed   Using the telphone No Help Needed No Help Needed   Taking your medications No Help Needed No Help Needed   Preparing meals No Help Needed No Help Needed   Managing money (expenses/bills) No Help Needed No Help Needed   Moderately strenuous housework (laundry) No Help Needed No Help Needed   Shopping for personal items (toiletries/medicines) No Help Needed No Help Needed   Shopping for groceries No Help Needed No Help Needed   Driving No Help Needed No Help Needed   Climbing a flight of stairs No Help Needed No 
today. monitor.  Con't current tx for now and recheck lytes, GFR in BMP     HLD  Well controlled. Plan recheck labs summer 2024, adjust PRN, con't current tx if in goal.    DJD  Stable lately. Con't with Juliana for Phys therapy, use grab bar for tub.    Sinus congestion  Use flonase, monitor.    Wants results by letter.     F/U 1mo/PRN.

## 2024-01-15 NOTE — PATIENT INSTRUCTIONS
RSV vaccine and New COVID shot due at your pharmacy. Please get those when available, they can help prevent severe lung disease.

## 2024-01-16 LAB
ANION GAP SERPL CALC-SCNC: 7 MMOL/L (ref 5–15)
BUN SERPL-MCNC: 20 MG/DL (ref 6–20)
BUN/CREAT SERPL: 28 (ref 12–20)
CALCIUM SERPL-MCNC: 10.7 MG/DL (ref 8.5–10.1)
CHLORIDE SERPL-SCNC: 106 MMOL/L (ref 97–108)
CO2 SERPL-SCNC: 30 MMOL/L (ref 21–32)
CREAT SERPL-MCNC: 0.71 MG/DL (ref 0.55–1.02)
CREAT UR-MCNC: 106 MG/DL
EST. AVERAGE GLUCOSE BLD GHB EST-MCNC: 163 MG/DL
GLUCOSE SERPL-MCNC: 146 MG/DL (ref 65–100)
HBA1C MFR BLD: 7.3 % (ref 4–5.6)
MICROALBUMIN UR-MCNC: 4.17 MG/DL
MICROALBUMIN/CREAT UR-RTO: 39 MG/G (ref 0–30)
POTASSIUM SERPL-SCNC: 4.7 MMOL/L (ref 3.5–5.1)
SODIUM SERPL-SCNC: 143 MMOL/L (ref 136–145)

## 2024-01-18 ENCOUNTER — OFFICE VISIT (OUTPATIENT)
Age: 84
End: 2024-01-18
Payer: MEDICARE

## 2024-01-18 VITALS
HEIGHT: 62 IN | OXYGEN SATURATION: 96 % | WEIGHT: 163 LBS | HEART RATE: 83 BPM | BODY MASS INDEX: 30 KG/M2 | SYSTOLIC BLOOD PRESSURE: 153 MMHG | DIASTOLIC BLOOD PRESSURE: 68 MMHG

## 2024-01-18 DIAGNOSIS — G47.33 OBSTRUCTIVE SLEEP APNEA (ADULT) (PEDIATRIC): Primary | ICD-10-CM

## 2024-01-18 PROCEDURE — 99213 OFFICE O/P EST LOW 20 MIN: CPT | Performed by: NURSE PRACTITIONER

## 2024-01-18 PROCEDURE — 3077F SYST BP >= 140 MM HG: CPT | Performed by: NURSE PRACTITIONER

## 2024-01-18 PROCEDURE — 1090F PRES/ABSN URINE INCON ASSESS: CPT | Performed by: NURSE PRACTITIONER

## 2024-01-18 PROCEDURE — G8400 PT W/DXA NO RESULTS DOC: HCPCS | Performed by: NURSE PRACTITIONER

## 2024-01-18 PROCEDURE — G8417 CALC BMI ABV UP PARAM F/U: HCPCS | Performed by: NURSE PRACTITIONER

## 2024-01-18 PROCEDURE — 1036F TOBACCO NON-USER: CPT | Performed by: NURSE PRACTITIONER

## 2024-01-18 PROCEDURE — 3078F DIAST BP <80 MM HG: CPT | Performed by: NURSE PRACTITIONER

## 2024-01-18 PROCEDURE — G8427 DOCREV CUR MEDS BY ELIG CLIN: HCPCS | Performed by: NURSE PRACTITIONER

## 2024-01-18 PROCEDURE — 1123F ACP DISCUSS/DSCN MKR DOCD: CPT | Performed by: NURSE PRACTITIONER

## 2024-01-18 PROCEDURE — G8484 FLU IMMUNIZE NO ADMIN: HCPCS | Performed by: NURSE PRACTITIONER

## 2024-01-18 ASSESSMENT — SLEEP AND FATIGUE QUESTIONNAIRES
HOW LIKELY ARE YOU TO NOD OFF OR FALL ASLEEP WHILE LYING DOWN TO REST IN THE AFTERNOON WHEN CIRCUMSTANCES PERMIT: 2
HOW LIKELY ARE YOU TO NOD OFF OR FALL ASLEEP IN A CAR, WHILE STOPPED FOR A FEW MINUTES IN TRAFFIC: 1
HOW LIKELY ARE YOU TO NOD OFF OR FALL ASLEEP WHEN YOU ARE A PASSENGER IN A CAR FOR AN HOUR WITHOUT A BREAK: 2
ESS TOTAL SCORE: 10
HOW LIKELY ARE YOU TO NOD OFF OR FALL ASLEEP WHILE SITTING AND TALKING TO SOMEONE: 0
HOW LIKELY ARE YOU TO NOD OFF OR FALL ASLEEP WHILE SITTING QUIETLY AFTER LUNCH WITHOUT ALCOHOL: 0
HOW LIKELY ARE YOU TO NOD OFF OR FALL ASLEEP WHILE SITTING INACTIVE IN A PUBLIC PLACE: 1
HOW LIKELY ARE YOU TO NOD OFF OR FALL ASLEEP WHILE WATCHING TV: 2
HOW LIKELY ARE YOU TO NOD OFF OR FALL ASLEEP WHILE SITTING AND READING: 2

## 2024-01-18 NOTE — PROGRESS NOTES
Katey Sterling (: 1940) is a 83 y.o. female, established patient, seen for positive airway pressure follow-up, she was last seen by me on 2023. Previous sleep apnea diagnosis 15+ years ago treated with PAP.  Most recent sleep testing completed 2015 at [facility], copies provided by patient show in lab split sleep test with Avg AHI 41/hr with a lowest SpO2 of 83%, effectively treated at a CPAP pressure of 10 cmH2O.        ASSESSMENT/PLAN:   Diagnosis Orders   1. Obstructive sleep apnea (adult) (pediatric)  DME Order for (Specify) as OP      2. Body mass index (BMI) 29.0-29.9, adult            AHI: 41(2015). External Lab. Resmed A11: APAP 9-12 cm H2O. set up 2022 - serial # [id number]      Previously On Respironics CPAP: 10 cm H2O Set up 2015.  Serial # [id number]    She is adherent with PAP therapy and PAP continues to benefit patient and remains necessary for control of her sleep apnea.     Follow-up and Dispositions    Return in about 5 months (around 2024) for compliance follow up.         Sleep Apnea well controlled, continue with current pressures at APAP 9-12 cmH2O.    *  Supplies - nasal pillows mask and heated tubing    Orders Placed This Encounter   Procedures    DME Order for (Specify) as OP     Diagnosis: (G47.33) THANG (obstructive sleep apnea)  (primary encounter diagnosis)     Replacement Supplies for Positive Airway Pressure Therapy Device:   Duration of need: 99 months.        Nasal Pillows (Replace) 2 per month.    Nasal Interface Mask 1 every 3 months.     Pos Airway pressure chin strap   Headgear 1 every 6 months.     Tubing with heating element 1 every 3 months.     Filter(s) Disposable 2 per month.   Filter(s) Non-Disposable 1 every 6 months.   .    Water Chamber for Humidifier (Replace) 1 every 6 months.      URBANO Solis; NPI: [id number]    Electronically signed. Date:- 24       *

## 2024-01-19 NOTE — PATIENT INSTRUCTIONS
drowsiness. Medications that impair a drivers attention should have a warning label. Alcohol naturally makes you sleepy and on its own can cause accidents. Combined with excessive drowsiness its effects are amplified.   Signs of Drowsy Driving:   * You don't remember driving the last few miles   * You may drift out of your karan   * You are unable to focus and your thoughts wander   * You may yawn more often than normal   * You have difficulty keeping your eyes open / nodding off   * Missing traffic signs, speeding, or tailgating  Prevention-   Good sleep hygiene, lifestyle and behavioral choices have the most impact on drowsy driving. There is no substitute for sleep and the average person requires 8 hours nightly. If you find yourself driving drowsy, stop and sleep. Consider the sleep hygiene tips provided during your visit as well.     Medication Refill Policy: Refills for all medications require 1 week advance notice. Please have your pharmacy fax a refill request. We are unable to fax, or call in \"controled substance\" medications and you will need to pick these prescriptions up from our office.

## 2024-01-26 ENCOUNTER — CLINICAL DOCUMENTATION (OUTPATIENT)
Age: 84
End: 2024-01-26

## 2024-02-13 ENCOUNTER — TELEMEDICINE (OUTPATIENT)
Age: 84
End: 2024-02-13

## 2024-02-13 DIAGNOSIS — I10 PRIMARY HYPERTENSION: ICD-10-CM

## 2024-02-13 DIAGNOSIS — E78.2 MIXED HYPERLIPIDEMIA: ICD-10-CM

## 2024-02-13 DIAGNOSIS — E11.21 TYPE 2 DIABETES MELLITUS WITH DIABETIC NEPHROPATHY, WITHOUT LONG-TERM CURRENT USE OF INSULIN (HCC): Primary | ICD-10-CM

## 2024-02-13 RX ORDER — METFORMIN HYDROCHLORIDE 500 MG/1
TABLET, EXTENDED RELEASE ORAL
Qty: 360 TABLET | Refills: 2 | Status: SHIPPED | OUTPATIENT
Start: 2024-02-13

## 2024-02-13 NOTE — PROGRESS NOTES
Katey Sterling is a 83 y.o. female presenting for/with:    Chief Complaint   Patient presents with    Medication Check       There were no vitals filed for this visit.    Pain Scale: /10  Pain Location:     \"Have you been to the ER, urgent care clinic since your last visit?  Hospitalized since your last visit?\"    NO    “Have you seen or consulted any other health care providers outside of [facility] since your last visit?”    NO                 2/13/2024     1:18 PM   PHQ-9    Little interest or pleasure in doing things 0   Feeling down, depressed, or hopeless 0   PHQ-2 Score 0   PHQ-9 Total Score 0            No data to display                     2/13/2024     1:18 PM   Amb Fall Risk Assessment and TUG Test   Do you feel unsteady or are you worried about falling?  no   2 or more falls in past year? no   Fall with injury in past year? no           2/13/2024     1:00 PM 1/15/2024     1:00 PM 7/10/2023     1:00 PM   ADL ASSESSMENT   Feeding yourself No Help Needed No Help Needed No Help Needed   Getting from bed to chair No Help Needed No Help Needed No Help Needed   Getting dressed No Help Needed No Help Needed No Help Needed   Bathing or showering No Help Needed No Help Needed No Help Needed   Walk across the room (includes cane/walker) No Help Needed No Help Needed No Help Needed   Using the telphone No Help Needed No Help Needed No Help Needed   Taking your medications No Help Needed No Help Needed No Help Needed   Preparing meals No Help Needed No Help Needed No Help Needed   Managing money (expenses/bills) No Help Needed No Help Needed No Help Needed   Moderately strenuous housework (laundry) No Help Needed No Help Needed No Help Needed   Shopping for personal items (toiletries/medicines) No Help Needed No Help Needed No Help Needed   Shopping for groceries No Help Needed No Help Needed No Help Needed   Driving No Help Needed No Help Needed No Help Needed   Climbing a flight of stairs No Help 
0.25 mg into the skin once a week Indications: sugar and protect kidney, heart For sugar diabetes and weight    Insulin Pen Needle (PEN NEEDLES) 30G X 5 MM MISC 1 each by Does not apply route once a week    atorvastatin (LIPITOR) 20 MG tablet Take 1 tablet by mouth daily    losartan (COZAAR) 100 MG tablet TAKE 1 TABLET DAILY FOR PRESSURE    Exenatide ER (BYDUREON BCISE) 2 MG/0.85ML injection INJECT 1 INJECTION WEEKLY FOR DIABETES    metFORMIN (GLUCOPHAGE-XR) 500 MG extended release tablet TAKE TWO TABLETS BY MOUTH TWO TIMES A DAY FOR TYPE 2 DIABETES MELLITUS    clotrimazole (LOTRIMIN) 1 % cream Apply topically 2 times daily    mometasone (ELOCON) 0.1 % cream AAA BID prn itching bumps     No current facility-administered medications for this visit.     ROS:   General: No fever, chills, or abnormal weight loss  Respiratory: No cough, dyspnea  CV: No chest pain, palpitations    Objective:  Wt Readings from Last 3 Encounters:   01/18/24 73.9 kg (163 lb)   01/15/24 76.6 kg (168 lb 12.8 oz)   07/10/23 75.1 kg (165 lb 9.6 oz)     BP Readings from Last 3 Encounters:   01/18/24 (!) 153/68   01/15/24 128/62   07/10/23 138/70     Physical Examination: General appearance - alert, well appearing, and in no distress  Mental status - alert, oriented to person, place, and time  Eyes - pupils equal and reactive, extraocular eye movements intact  ENT - bilateral external ears and nose normal, mild turbinate edema and clear d/c. NTTP to max sinuses or ethmoids. Normal lips  Neck - supple, no significant adenopathy, no thyromegaly or mass  Chest - clear to auscultation, no wheezes, rales or rhonchi, symmetric air entry  Heart - normal rate, regular rhythm, normal S1, S2, no murmurs, rubs, clicks or gallops  Extremities - peripheral pulses normal, no pedal edema, no clubbing or cyanosis    Assessment & Plan:     DM2  Doing well on new dose ozempic 0.25mg, but wt stable. Boost to 0.5mg weekly. con't metformin for now, but if A1c dropping

## 2024-04-17 NOTE — PROGRESS NOTES
PATIENT ACCOMPANIED BY:  with Mother  History provided by the mother    PEDIATRIC ILLNESS VISIT     Foot (/) and Rash            SUBJECTIVE:  Neel Marley is a 7 year old year old female  who is complaining of fugus on foot and rash on chest, belly and back.  Present for several days and is stable.  Present treatments include - None.   Previous medical contacts for the problem - None  Symptoms:  Fever: No elevation of temperature  General: No problems, irritability, fusiness or lethargy  Comes in for evaluation of several areas of skin concerns.  He has a rash around her feet and on the bottom of the feet.  Somewhat pruritic mother thought this may be a fungal infection.  No change in skin care products laundry detergents fabric softeners does have somewhat sweaty feet in general.  Also has had several patches of red skin on her chest and back and belly area.  No history of fever sore throats.    ROS: negative except for previous mentioned concerns    Allergies:  ALLERGIES:  No Known Allergies    Current Outpatient Medications   Medication Sig Dispense Refill   • hydroCORTisone (CORTIZONE) 2.5 % ointment Apply 1 Application topically in the morning and 1 Application at noon and 1 Application in the evening. 30 g 3   • albuterol 108 (90 Base) MCG/ACT inhaler Inhale 2 puffs into the lungs every 4 hours as needed for Shortness of Breath or Wheezing. 1 each 0   • Spacer/Aero-Hold Chamber Mask Misc Use as directed 1 each 0   • Pediatric Multivit-Minerals-C (KIDS GUMMY BEAR VITAMINS PO)        No current facility-administered medications for this visit.     Family history:   No other family members have acute illness     Social history:   Attends school and No recent travel    OBJECTIVE:  Visit Vitals  Pulse 110   Temp 98.7 °F (37.1 °C)   Wt 21.8 kg (48 lb)   SpO2 99%      GENERAL: healthy appearing and alert, in no acute distress  HEAD & SCALP: normocephalic with no lesions    EYES: + red reflex b/l, no redness, no  Subjective:   Rash    HPI:  Rash  Has had itchy red bumps to the volar forearms and bilat anterior shins for past month. Gradually worsening. Tried some topical hydrocortisone 1% OTC cream, not too helpful. Now having hard time sleeping at night due to itching. Diabetes. Sugars controlled well on last check on metformin and bydureon 1 inj/wk. Weight about the same. Hypoglycemia: none. Tolerating current treatment well. Current medications include metformin ER 2000mg/d and bydureon bcise weekly. Lab Results   Component Value Date/Time    Hemoglobin A1c 7.3 (H) 09/24/2021 03:30 PM    Hemoglobin A1c 7.2 (H) 05/12/2021 11:51 AM    Hemoglobin A1c 7.1 (H) 11/11/2020 12:03 PM    Glucose 164 (H) 09/24/2021 03:30 PM    Glucose (POC) 128 (H) 02/22/2017 01:22 PM    Microalbumin/Creat ratio (mg/g creat) 26 09/24/2021 03:30 PM    Microalbumin,urine random 3.77 09/24/2021 03:30 PM    Microalbumin/creat ratio (POC) <30 05/07/2018 02:34 PM    LDL, calculated 26.8 09/24/2021 03:30 PM    Creatinine 0.83 09/24/2021 03:30 PM     Last eye exam performed last year with Dr. Chan Flores ofc in [facility], no DR. Pt plans to make recheck appt soon. Hypertension. Blood pressures in goal. Management at last visit included con't current tx. Current regimen: angiotensin II receptor antagonist. Symptoms include no symptoms. Patient denies chest pain, palpitations, peripheral edema. Lab review:   Lab Results   Component Value Date/Time    Sodium 142 09/24/2021 03:30 PM    Potassium 4.4 09/24/2021 03:30 PM    Chloride 108 09/24/2021 03:30 PM    CO2 28 09/24/2021 03:30 PM    Anion gap 6 09/24/2021 03:30 PM    Glucose 164 (H) 09/24/2021 03:30 PM    BUN 18 09/24/2021 03:30 PM    Creatinine 0.83 09/24/2021 03:30 PM    BUN/Creatinine ratio 22 (H) 09/24/2021 03:30 PM    GFR est AA >60 09/24/2021 03:30 PM    GFR est non-AA >60 09/24/2021 03:30 PM    Calcium 10.2 (H) 09/24/2021 03:30 PM     Hyperlipidemia. On lipitor 20. Sommer well.  No drainage, EOMI, JOSE and normal b/l conjunctiva  EARS: normal appearance to external ears, canals, TMs visualized b/l   NOSE: Patent, no drainage, no swelling  MOUTH: moist mucus membranes, no lesions  THROAT: no erythema, no lesions  NECK: supple, no adenopathy, no masses  CHEST:  Clear to ausculation b/l no wheezes, no retractions  CARDIO: regular rate and rhythm, no murmur  ABDOMEN: +BS, soft, nontender, nondistended, no masses, no hepatosplenomegaly  SKIN:  Circular somewhat raised textured areas on chest and back.  No central pallor no beaded border.  No weeping or oozing crusting noted.  Discoloration in the bottom of her foot with moist appearing skin.      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  Discussed that if more lesions develop and if there is a potential for a \"Shey tree\" type pattern then this may be early pityriasis rosea.  Natural history duration was discussed in general terms.  Foot dermatitis appears to be more likely due to sweat and discussed the concept of dyshidrotic eczema.  Rotation of shoes, airing out feet.  No evidence of erythema or redness to suggest a fungal or bacterial infection at this time.  Judicious use of lubrication and hydrocortisone cream was also discussed.  Medication changes: Yes   Immunizations given today? NONE  See Orders:  Instructed to call if the problem worsens or does not improve within the next 24 to 48 hours.  Schedule follow-up: in a few weeks    This note was created using Dragon Voice recognition software. Errors in transcription may be related  to improper voice recognition by the software. Effort to review and correct the note has been made ,but irregularities may still be present.      Note to Patient:  The 21st Century Cares Act makes medical notes like these available to patients in the interest of  myalgias, arthralgias, unusual weakness. Lab Results   Component Value Date/Time    Cholesterol, total 146 09/24/2021 03:30 PM    HDL Cholesterol 56 09/24/2021 03:30 PM    LDL, calculated 26.8 09/24/2021 03:30 PM    VLDL, calculated 63.2 09/24/2021 03:30 PM    Triglyceride 316 (H) 09/24/2021 03:30 PM    CHOL/HDL Ratio 2.6 09/24/2021 03:30 PM     Lab Results   Component Value Date/Time    ALT (SGPT) 30 09/24/2021 03:30 PM    Alk. phosphatase 103 09/24/2021 03:30 PM    Bilirubin, total 0.3 09/24/2021 03:30 PM     Hearing loss  PT reports worsening auditory acuity. Told by her audiologist that she needed her earwax removed. PMH, SH, Medications/Allergies: reviewed, on chart. Current Outpatient Medications   Medication Sig    mometasone (ELOCON) 0.1 % topical cream AAA BID prn itching bumps    atorvastatin (LIPITOR) 20 mg tablet TAKE 1 TABLET BY MOUTH DAILY TO PREVENT CARDIAC DISEASE    metFORMIN ER (GLUCOPHAGE XR) 500 mg tablet TAKE TWO TABLETS BY MOUTH TWO TIMES A DAY FOR TYPE 2 DIABETES MELLITUS    losartan (COZAAR) 100 mg tablet TAKE 1 TABLET DAILY FOR PRESSURE    exenatide microspheres (Bydureon BCise) 2 mg/0.85 mL atIn INJECT 1 INJECTION WEEKLY FOR DIABETES    clotrimazole (LOTRIMIN) 1 % topical cream Apply  to affected area two (2) times a day.  AMOXICILLIN PO Take  by mouth. Takes 4 tabs prior to dental work     No current facility-administered medications for this visit.       Allergies   Allergen Reactions    Ace Inhibitors Unable to Obtain    Antihist [Diphenhydramine Hcl] Hives    Diphenhydramine Hives    Zithromax [Azithromycin] Itching       ROS:  Constitutional: No fever, chills or abnormal weight loss  Respiratory: No cough, SOB   CV: No chest pain or Palpitations    Visit Vitals  /76   Pulse 79   Temp 97.3 °F (36.3 °C)   Resp 20   Ht 5' 2\" (1.575 m)   Wt 171 lb 8 oz (77.8 kg)   SpO2 97%   BMI 31.37 kg/m²     -1#  Wt Readings from Last 3 Encounters:   10/01/21 171 lb 8 oz (77.8 kg)   09/24/21 172 lb 4 oz (78.1 kg)   05/12/21 173 lb 9.6 oz (78.7 kg)     BP Readings from Last 3 Encounters:   10/01/21 120/76   09/24/21 (!) 140/60   05/12/21 116/66       Objective:     Physical Examination: General appearance - alert, well appearing, and in no distress  Mental status - alert, oriented to person, place, and time  Eyes - pupils equal and reactive, extraocular eye movements intact  ENT - bilateral external ears with heavy cerumen impaction. Ext nose normal. Normal lips  Neck - supple, no significant adenopathy, no thyromegaly or mass  Lymphatics - no palpable lymphadenopathy, no hepatosplenomegaly  Chest - clear to auscultation, no wheezes, rales or rhonchi, symmetric air entry  Heart - normal rate, regular rhythm, normal S1, S2, no murmurs, rubs, clicks or gallops  Extremities - peripheral pulses normal, no pedal edema, no clubbing or cyanosis  Skin- mult erythematous papules to volar wrists, anterior shins    Assessment & Plan:     Eczema  Mild. tx with elocon 0.1% cr AAA BID PRN itching. DM2  W/c but anticipate hyperglycemia with prednisone. Add amaryl 1mg daily prn sugar >200. F/U 6mo/PRN. 1. Have you been to the ER, urgent care clinic since your last visit? Hospitalized since your last visit? No    2. Have you seen or consulted any other health care providers outside of the [address] [place] [address] since your last visit? Include any pap smears or colon screening.  No transparency. However, be advised this is a medical document. It is intended as a peer communication. It is written in medical language and may contain abbreviations or verbiage that are unfamiliar. It may appear blunt or direct. Medical documents are intended to carry relevant information, facts as evident, and the clinical opinion of the practitioner.    Total time spent was 23 minutes.  This includes:  Review of patient's chart prior to the visit and ER/walk-in care visits, labs and imaging.  The actual patient visit.  The time to complete the appropriate documentation after the visit.    Isac Linares MD,FAAP

## 2024-05-22 ENCOUNTER — TELEPHONE (OUTPATIENT)
Age: 84
End: 2024-05-22

## 2024-05-22 NOTE — TELEPHONE ENCOUNTER
I spoke with pt and she is very concerned with her vision. She has seen her (eye dr) and has had two changes to her lenses since April 10th - However, I did not see any recent documentation scanned into her chart.  She is having to close her left eye in order to see when reading or riding with her . She is questioning the change in medication to Ozempic - on her second dose and it causes her to lose her balance severely, wondering if it is effecting her vision.

## 2024-05-22 NOTE — TELEPHONE ENCOUNTER
Contacted pt. Please get me the last note from Dr Briscoe at [facility]. Cut ozempic to 0.25mg every other week, or try giving 0.125mg/wk, but pt to see Luis Felipe soon. Pt informed, gave understanding.

## 2024-06-20 ENCOUNTER — OFFICE VISIT (OUTPATIENT)
Age: 84
End: 2024-06-20
Payer: MEDICARE

## 2024-06-20 VITALS
WEIGHT: 160 LBS | SYSTOLIC BLOOD PRESSURE: 123 MMHG | OXYGEN SATURATION: 97 % | HEART RATE: 73 BPM | DIASTOLIC BLOOD PRESSURE: 63 MMHG | HEIGHT: 62 IN | BODY MASS INDEX: 29.44 KG/M2

## 2024-06-20 DIAGNOSIS — I10 ESSENTIAL (PRIMARY) HYPERTENSION: ICD-10-CM

## 2024-06-20 DIAGNOSIS — G47.33 OBSTRUCTIVE SLEEP APNEA (ADULT) (PEDIATRIC): Primary | ICD-10-CM

## 2024-06-20 PROCEDURE — 1036F TOBACCO NON-USER: CPT | Performed by: NURSE PRACTITIONER

## 2024-06-20 PROCEDURE — 1090F PRES/ABSN URINE INCON ASSESS: CPT | Performed by: NURSE PRACTITIONER

## 2024-06-20 PROCEDURE — 1123F ACP DISCUSS/DSCN MKR DOCD: CPT | Performed by: NURSE PRACTITIONER

## 2024-06-20 PROCEDURE — 3074F SYST BP LT 130 MM HG: CPT | Performed by: NURSE PRACTITIONER

## 2024-06-20 PROCEDURE — G8400 PT W/DXA NO RESULTS DOC: HCPCS | Performed by: NURSE PRACTITIONER

## 2024-06-20 PROCEDURE — G8427 DOCREV CUR MEDS BY ELIG CLIN: HCPCS | Performed by: NURSE PRACTITIONER

## 2024-06-20 PROCEDURE — 3078F DIAST BP <80 MM HG: CPT | Performed by: NURSE PRACTITIONER

## 2024-06-20 PROCEDURE — 99214 OFFICE O/P EST MOD 30 MIN: CPT | Performed by: NURSE PRACTITIONER

## 2024-06-20 PROCEDURE — G8417 CALC BMI ABV UP PARAM F/U: HCPCS | Performed by: NURSE PRACTITIONER

## 2024-06-20 ASSESSMENT — SLEEP AND FATIGUE QUESTIONNAIRES
HOW LIKELY ARE YOU TO NOD OFF OR FALL ASLEEP WHILE SITTING QUIETLY AFTER LUNCH WITHOUT ALCOHOL: SLIGHT CHANCE OF DOZING
HOW LIKELY ARE YOU TO NOD OFF OR FALL ASLEEP WHILE WATCHING TV: SLIGHT CHANCE OF DOZING
HOW LIKELY ARE YOU TO NOD OFF OR FALL ASLEEP WHILE LYING DOWN TO REST IN THE AFTERNOON WHEN CIRCUMSTANCES PERMIT: MODERATE CHANCE OF DOZING
HOW LIKELY ARE YOU TO NOD OFF OR FALL ASLEEP WHILE SITTING AND TALKING TO SOMEONE: SLIGHT CHANCE OF DOZING
HOW LIKELY ARE YOU TO NOD OFF OR FALL ASLEEP WHILE SITTING AND READING: SLIGHT CHANCE OF DOZING
ESS TOTAL SCORE: 7
HOW LIKELY ARE YOU TO NOD OFF OR FALL ASLEEP WHILE SITTING INACTIVE IN A PUBLIC PLACE: WOULD NEVER DOZE
HOW LIKELY ARE YOU TO NOD OFF OR FALL ASLEEP IN A CAR, WHILE STOPPED FOR A FEW MINUTES IN TRAFFIC: WOULD NEVER DOZE

## 2024-06-20 NOTE — PATIENT INSTRUCTIONS
[address]  [place] [address]  Tel.  [phone]  Fax. [phone] [address]  [place] [address]  Tel.  [phone]  Fax. [phone] [address] [place] [address]  Tel.  [phone]  Fax. [phone]     Learning About CPAP for Sleep Apnea  What is CPAP?              CPAP is a small machine that you use at home every night while you sleep. It increases air pressure in your throat to keep your airway open. When you have sleep apnea, this can help you sleep better so you feel much better. CPAP stands for \"continuous positive airway pressure.\"  The CPAP machine will have one of the following:  A mask that covers your nose and mouth  Prongs that fit into your nose  A mask that covers your nose only, the most common type. This type is called NCPAP. The N stands for \"nasal.\"  Why is it done?  CPAP is usually the best treatment for obstructive sleep apnea. It is the first treatment choice and the most widely used. Your doctor may suggest CPAP if you have:  Moderate to severe sleep apnea.  Sleep apnea and coronary artery disease (CAD) or heart failure.  How does it help?  CPAP can help you have more normal sleep, so you feel less sleepy and more alert during the daytime.  CPAP may help keep heart failure or other heart problems from getting worse.  NCPAP may help lower your blood pressure.  If you use CPAP, your bed partner may also sleep better because you are not snoring or restless.  What are the side effects?  Some people who use CPAP have:  A dry or stuffy nose and a sore throat.  Irritated skin on the face.  Sore eyes.  Bloating.  If you have any of these problems, work with your doctor to fix them. Here are some things you can try:  Be sure the mask or nasal prongs fit well.  See if your doctor can adjust the pressure of your CPAP.  If your nose is dry, try a humidifier.  If your nose is runny or stuffy, try decongestant medicine or a steroid

## 2024-06-20 NOTE — PROGRESS NOTES
[address]   [place] [address]   Tel.  [phone]   Fax. [phone]  [address]   [place] [address]   Tel.  [phone]   Fax. [phone] [address] [place] [address]   Tel.  [phone]   Fax. [phone]     Katey Sterling (: 1940) is a 84 y.o. female, established patient, seen for positive airway pressure follow-up and evaluation.  She was last seen by me on 2024. Previous sleep apnea diagnosis 15+ years ago treated with PAP.  Most recent sleep testing completed 2015 at [facility], copies provided by patient show in lab split sleep test with Avg AHI 41/hr with a lowest SpO2 of 83%, effectively treated at a CPAP pressure of 10 cmH2O.       ASSESSMENT/PLAN:   Diagnosis Orders   1. Obstructive sleep apnea (adult) (pediatric)  DME Order for (Specify) as OP    DME Order for (Specify) as OP      2. Essential (primary) hypertension        3. Adult BMI 29.0-29.9 kg/sq m              AHI: 41(2015). External Lab. Resmed A10: APAP 9-12 cm H2O. set up 2022 - serial # [id number]      Previously On Respironics CPAP: 10 cm H2O Set up 2015.  Serial # [id number]    She is adherent with PAP therapy and PAP continues to benefit patient and remains necessary for control of her sleep apnea.     Follow-up and Dispositions    Return in about 1 year (around 2025) for Annual follow up.         Sleep Apnea well controlled, continue with current pressures APAP 9-12 cmH2O.    * Supplies - nasal pillows mask and heated tubing    Orders Placed This Encounter   Procedures    DME Order for (Specify) as OP     Diagnosis: (G47.33) THANG (obstructive sleep apnea)  (primary encounter diagnosis)     Replacement Supplies for Positive Airway Pressure Therapy Device:   Duration of need: 99 months.      Nasal Pillows (Replace) 2 per month.   Resmed P10 small   Nasal Interface Mask 1 every 3 months.     Pos Airway

## 2024-06-21 ENCOUNTER — CLINICAL DOCUMENTATION (OUTPATIENT)
Age: 84
End: 2024-06-21

## 2024-07-09 ENCOUNTER — TELEPHONE (OUTPATIENT)
Age: 84
End: 2024-07-09

## 2024-07-09 NOTE — TELEPHONE ENCOUNTER
Patient phoned the office stating that [facility] has been attempting to email her documents to sign before her supplies will be shipped.  She stated that she continues to get a busy signal when calling.  Alternate number provided to reach out to DME company.  
current weight

## 2024-08-14 ENCOUNTER — OFFICE VISIT (OUTPATIENT)
Age: 84
End: 2024-08-14
Payer: MEDICARE

## 2024-08-14 VITALS
HEART RATE: 64 BPM | TEMPERATURE: 98 F | WEIGHT: 167.6 LBS | SYSTOLIC BLOOD PRESSURE: 117 MMHG | RESPIRATION RATE: 18 BRPM | OXYGEN SATURATION: 97 % | DIASTOLIC BLOOD PRESSURE: 63 MMHG | BODY MASS INDEX: 30.84 KG/M2 | HEIGHT: 62 IN

## 2024-08-14 DIAGNOSIS — Z00.00 MEDICARE ANNUAL WELLNESS VISIT, SUBSEQUENT: ICD-10-CM
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
## 2024-08-14 DIAGNOSIS — E11.21 TYPE 2 DIABETES MELLITUS WITH DIABETIC NEPHROPATHY, WITHOUT LONG-TERM CURRENT USE OF INSULIN (HCC): Primary | ICD-10-CM

## 2024-08-14 DIAGNOSIS — B35.3 TINEA PEDIS OF BOTH FEET: ICD-10-CM

## 2024-08-14 LAB — GLUCOSE, POC: 149 MG/DL

## 2024-08-14 PROCEDURE — 82962 GLUCOSE BLOOD TEST: CPT | Performed by: FAMILY MEDICINE

## 2024-08-14 RX ORDER — CLOTRIMAZOLE 1 %
CREAM (GRAM) TOPICAL
Qty: 60 G | Refills: 2 | Status: SHIPPED | OUTPATIENT
Start: 2024-08-14 | End: 2024-08-21

## 2024-08-14 RX ORDER — MOMETASONE FUROATE 1 MG/G
CREAM TOPICAL
Qty: 45 G | Refills: 4 | Status: SHIPPED | OUTPATIENT
Start: 2024-08-14

## 2024-08-14 SDOH — ECONOMIC STABILITY: FOOD INSECURITY: WITHIN THE PAST 12 MONTHS, THE FOOD YOU BOUGHT JUST DIDN'T LAST AND YOU DIDN'T HAVE MONEY TO GET MORE.: NEVER TRUE

## 2024-08-14 SDOH — ECONOMIC STABILITY: FOOD INSECURITY: WITHIN THE PAST 12 MONTHS, YOU WORRIED THAT YOUR FOOD WOULD RUN OUT BEFORE YOU GOT MONEY TO BUY MORE.: NEVER TRUE

## 2024-08-14 SDOH — ECONOMIC STABILITY: INCOME INSECURITY: HOW HARD IS IT FOR YOU TO PAY FOR THE VERY BASICS LIKE FOOD, HOUSING, MEDICAL CARE, AND HEATING?: NOT HARD AT ALL

## 2024-08-14 ASSESSMENT — PATIENT HEALTH QUESTIONNAIRE - PHQ9
2. FEELING DOWN, DEPRESSED OR HOPELESS: NOT AT ALL
SUM OF ALL RESPONSES TO PHQ9 QUESTIONS 1 & 2: 1
SUM OF ALL RESPONSES TO PHQ QUESTIONS 1-9: 1
1. LITTLE INTEREST OR PLEASURE IN DOING THINGS: SEVERAL DAYS
SUM OF ALL RESPONSES TO PHQ QUESTIONS 1-9: 1

## 2024-08-14 ASSESSMENT — LIFESTYLE VARIABLES
HOW OFTEN DO YOU HAVE A DRINK CONTAINING ALCOHOL: NEVER
HOW MANY STANDARD DRINKS CONTAINING ALCOHOL DO YOU HAVE ON A TYPICAL DAY: PATIENT DOES NOT DRINK

## 2024-08-14 NOTE — PATIENT INSTRUCTIONS
memory and other mental skills. Your doctor may also talk to close friends and family members. This can help the doctor figure out how your memory and other mental skills have changed.  You may get blood tests and tests that look at your brain.  These questions and tests can make sure you don't have other conditions that can cause symptoms like MCI. These include depression, sleep problems, and side effects from medicines.  How is it treated?  There are no medicines to treat MCI or to keep it from progressing to dementia. But treating conditions like high blood pressure and diabetes may help. A person with MCI needs routine follow-up visits with their doctor to check on changes in the person's mental skills.  How can you care for yourself at home?  Keeping your body active can help slow MCI. Exercises like walking can help. Try to stay active mentally too. Read or do things like crossword puzzles if you enjoy doing them.  If you need help coping with MCI, you may want to get support from family, friends, a support group, or a counselor who works with people who have MCI.  Though the future isn't always clear, it can be good to plan ahead with instructions for your care. These are called advanced directives. Having a plan can help make sure that you get the care you want.  Current as of: December 20, 2023  Content Version: 14.1  © 2006-2024 [employer].   Care instructions adapted under license by [employer]. If you have questions about a medical condition or this instruction, always ask your healthcare professional. [employer] disclaims any warranty or liability for your use of this information.           Learning About Emotional Support  When do you need emotional support?     You might find getting support from others helpful when you have a long-term health problem. Often people feel alone, confused, or scared when coping with an illness. But you aren't alone. Other people are going

## 2024-08-14 NOTE — PROGRESS NOTES
Katey Sterling is a 84 y.o. female who was seen by synchronous (real-time) audio technology on 8/14/2024.  Pt was seen at home in [place]. Provider was at the office in [place]. No other participants in this encounter.    Subjective:   Medicare AWV and Immunizations (VIIS verified )    HPI:  Diabetes.  Sugars controlled well on last check on metformin and ozempic. Had some concerns that the ozempic was affecting the vision, so we cut to 0.125 mg/wk, but no change in vision. No trouble getting that. Home Weight running ~165#.  Hypoglycemia: none. Tolerating current treatment well.  Current medications include metformin ER 2000mg/d and ozempic 0.125mg weekly.  Lab Results   Component Value Date    LABA1C 7.3 (H) 01/15/2024    LABA1C 7.1 (H) 07/10/2023    LABA1C 7.0 (H) 01/09/2023    GLUCOSE 146 (H) 01/15/2024    CREATININE 0.71 01/15/2024     Lab Results   Component Value Date    MALBCR 39 (H) 01/15/2024     Spot glucose today: 149.    Last eye exam performed last year with Dr. [facility] in [place], no DR. Pt has cataract film, plans see Margie in AUG for laser tx.    Hypertension.   Blood pressures in goal. Management at last visit included con't current tx.  Current regimen: angiotensin II receptor antagonist. Symptoms include no symptoms. Patient denies chest pain, palpitations, peripheral edema.  Lab review:   Lab review:   Lab Results   Component Value Date     01/15/2024    K 4.7 01/15/2024     01/15/2024    CO2 30 01/15/2024    GLUCOSE 146 (H) 01/15/2024    BUN 20 01/15/2024    CREATININE 0.71 01/15/2024     Hyperlipidemia.  On lipitor 20. Jose Martin well. No myalgias, arthralgias, unusual weakness.  Lab Results   Component Value Date    CHOL 148 07/10/2023    HDL 59 07/10/2023    TRIG 326 (H) 07/10/2023    AST 5 (L) 07/10/2023    ALT 16 07/10/2023    ALKPHOS 103 07/10/2023    BILITOT 0.3 07/10/2023     .   Reviewed PMH, PSH, SH, Medications, allergies (see chart).  Current Outpatient 
Needed No Help Needed No Help Needed No Help Needed   Preparing meals No Help Needed No Help Needed No Help Needed No Help Needed   Managing money (expenses/bills) No Help Needed No Help Needed No Help Needed No Help Needed   Moderately strenuous housework (laundry) No Help Needed No Help Needed No Help Needed No Help Needed   Shopping for personal items (toiletries/medicines) No Help Needed No Help Needed No Help Needed No Help Needed   Shopping for groceries No Help Needed No Help Needed No Help Needed No Help Needed   Driving No Help Needed No Help Needed No Help Needed No Help Needed   Climbing a flight of stairs No Help Needed No Help Needed No Help Needed No Help Needed   Getting to places beyond walking distances No Help Needed No Help Needed No Help Needed No Help Needed           8/14/2024     1:00 PM   AMB Abuse Screening   Do you ever feel afraid of your partner? N   Are you in a relationship with someone who physically or mentally threatens you? N   Is it safe for you to go home? Y       Advance Care Planning     The patient has appointed the following active healthcare agents:    Primary Decision Maker: Eren Sterling - [facility] - [phone]

## 2024-08-15 LAB
ALBUMIN SERPL-MCNC: 3.6 G/DL (ref 3.5–5)
ALBUMIN/GLOB SERPL: 1.2 (ref 1.1–2.2)
ALP SERPL-CCNC: 112 U/L (ref 45–117)
ALT SERPL-CCNC: 21 U/L (ref 12–78)
ANION GAP SERPL CALC-SCNC: 5 MMOL/L (ref 5–15)
APPEARANCE UR: CLEAR
AST SERPL-CCNC: 9 U/L (ref 15–37)
BACTERIA URNS QL MICRO: NEGATIVE /HPF
BILIRUB SERPL-MCNC: 0.3 MG/DL (ref 0.2–1)
BILIRUB UR QL: NEGATIVE
BUN SERPL-MCNC: 16 MG/DL (ref 6–20)
BUN/CREAT SERPL: 23 (ref 12–20)
CALCIUM SERPL-MCNC: 10.5 MG/DL (ref 8.5–10.1)
CHLORIDE SERPL-SCNC: 106 MMOL/L (ref 97–108)
CHOLEST SERPL-MCNC: 146 MG/DL
CO2 SERPL-SCNC: 30 MMOL/L (ref 21–32)
COLOR UR: NORMAL
CREAT SERPL-MCNC: 0.7 MG/DL (ref 0.55–1.02)
CREAT UR-MCNC: 68.4 MG/DL
EPITH CASTS URNS QL MICRO: NORMAL /LPF
EST. AVERAGE GLUCOSE BLD GHB EST-MCNC: 186 MG/DL
GLOBULIN SER CALC-MCNC: 2.9 G/DL (ref 2–4)
GLUCOSE SERPL-MCNC: 131 MG/DL (ref 65–100)
GLUCOSE UR STRIP.AUTO-MCNC: NEGATIVE MG/DL
HBA1C MFR BLD: 8.1 % (ref 4–5.6)
HDLC SERPL-MCNC: 63 MG/DL
HDLC SERPL: 2.3 (ref 0–5)
HGB UR QL STRIP: NEGATIVE
HYALINE CASTS URNS QL MICRO: NORMAL /LPF (ref 0–5)
KETONES UR QL STRIP.AUTO: NEGATIVE MG/DL
LDLC SERPL CALC-MCNC: 34.6 MG/DL (ref 0–100)
LEUKOCYTE ESTERASE UR QL STRIP.AUTO: NEGATIVE
MICROALBUMIN UR-MCNC: 4.07 MG/DL
MICROALBUMIN/CREAT UR-RTO: 60 MG/G (ref 0–30)
NITRITE UR QL STRIP.AUTO: NEGATIVE
PH UR STRIP: 5.5 (ref 5–8)
POTASSIUM SERPL-SCNC: 4.3 MMOL/L (ref 3.5–5.1)
PROT SERPL-MCNC: 6.5 G/DL (ref 6.4–8.2)
PROT UR STRIP-MCNC: NEGATIVE MG/DL
RBC #/AREA URNS HPF: NORMAL /HPF (ref 0–5)
SODIUM SERPL-SCNC: 141 MMOL/L (ref 136–145)
SP GR UR REFRACTOMETRY: 1.02 (ref 1–1.03)
TRIGL SERPL-MCNC: 242 MG/DL
URINE CULTURE IF INDICATED: NORMAL
UROBILINOGEN UR QL STRIP.AUTO: 0.2 EU/DL (ref 0.2–1)
VLDLC SERPL CALC-MCNC: 48.4 MG/DL
WBC URNS QL MICRO: NORMAL /HPF (ref 0–4)

## 2024-09-26 ENCOUNTER — TELEPHONE (OUTPATIENT)
Age: 84
End: 2024-09-26

## 2024-09-26 DIAGNOSIS — H81.10 BENIGN PAROXYSMAL POSITIONAL VERTIGO, UNSPECIFIED LATERALITY: Primary | ICD-10-CM

## 2024-09-27 RX ORDER — MECLIZINE HYDROCHLORIDE 25 MG/1
25 TABLET ORAL 3 TIMES DAILY PRN
Qty: 30 TABLET | Refills: 0 | Status: SHIPPED | OUTPATIENT
Start: 2024-09-27 | End: 2024-10-07

## 2024-10-02 ENCOUNTER — TELEPHONE (OUTPATIENT)
Age: 84
End: 2024-10-02

## 2024-10-02 DIAGNOSIS — R42 VERTIGO: Primary | ICD-10-CM

## 2024-10-02 DIAGNOSIS — H53.2 DIPLOPIA: ICD-10-CM

## 2024-10-02 NOTE — TELEPHONE ENCOUNTER
Seen by ophthalmology. Recommended neuro consult for vertigo and diplopia (ophthalmology eyesight of Prashanth with a normal exam from ophthalmology). Neuro rec MRI of the brain with and without contrast [if she is able to tolerate contrast] with special attention to IACs and posterior fossa. Will try to order.

## 2024-10-28 ENCOUNTER — OFFICE VISIT (OUTPATIENT)
Age: 84
End: 2024-10-28

## 2024-10-28 VITALS
TEMPERATURE: 97.5 F | HEART RATE: 80 BPM | RESPIRATION RATE: 18 BRPM | HEIGHT: 62 IN | BODY MASS INDEX: 29.85 KG/M2 | OXYGEN SATURATION: 97 % | SYSTOLIC BLOOD PRESSURE: 125 MMHG | WEIGHT: 162.2 LBS | DIASTOLIC BLOOD PRESSURE: 71 MMHG

## 2024-10-28 DIAGNOSIS — R94.39 ABNORMAL STRESS TEST: Primary | ICD-10-CM

## 2024-10-28 DIAGNOSIS — E78.2 MIXED HYPERLIPIDEMIA: ICD-10-CM

## 2024-10-28 DIAGNOSIS — E11.21 TYPE 2 DIABETES MELLITUS WITH DIABETIC NEPHROPATHY, WITHOUT LONG-TERM CURRENT USE OF INSULIN (HCC): ICD-10-CM

## 2024-10-28 DIAGNOSIS — I10 PRIMARY HYPERTENSION: ICD-10-CM

## 2024-10-28 DIAGNOSIS — Z87.898 HISTORY OF CHEST PAIN: ICD-10-CM

## 2024-10-28 RX ORDER — ASPIRIN 81 MG/1
81 TABLET ORAL DAILY
Qty: 100 TABLET | Refills: 3 | Status: SHIPPED | OUTPATIENT
Start: 2024-10-28

## 2024-10-28 RX ORDER — NITROGLYCERIN 0.4 MG/1
0.4 TABLET SUBLINGUAL EVERY 5 MIN PRN
Qty: 25 TABLET | Refills: 0 | Status: SHIPPED | OUTPATIENT
Start: 2024-10-28

## 2024-10-28 ASSESSMENT — PATIENT HEALTH QUESTIONNAIRE - PHQ9
SUM OF ALL RESPONSES TO PHQ QUESTIONS 1-9: 0
1. LITTLE INTEREST OR PLEASURE IN DOING THINGS: NOT AT ALL
SUM OF ALL RESPONSES TO PHQ9 QUESTIONS 1 & 2: 0
SUM OF ALL RESPONSES TO PHQ QUESTIONS 1-9: 0
2. FEELING DOWN, DEPRESSED OR HOPELESS: NOT AT ALL

## 2024-10-28 NOTE — PROGRESS NOTES
Katey Sterling is a 84 y.o. female  Chief Complaint   Patient presents with    Follow-up     ER f/up for chest pain 10/23/2024 at [facility] .... Advised chest pain was stress induced due to eyesite issues and vertigo      HPI:  GARRET visit  Admission Date:  10/23/2024  Discharge Date:  10/24/2024   GARRET call   10/25/24    D/C summary reviewed, edited for clarity:    Hospital Course:  84-year-old with history of type 2 diabetes, hypertension, THANG, skin malignancy who presents with atypical chest pain.  Underwent stress test which showed 12% myocardium at risk for ischemia.      Atypical chest pain:  Positive stress test. EKG without ST elevation or T wave inversion.  Troponin negative x 2.  Stress test with moderate-sized, mildly reversible lateral defect involving the mid and basal segments.  Defect representing 12% of myocardium. Discussed case with cardiology, recommend outpatient follow up within 1-2 weeks to discuss medication vs outpatient cardiac cath.   10/23/24 1918    Troponin I  <=0.19 ng/mL <0.03     Elevated liver enzymes: AST 84 and ALT 66.  Viral hepatitis negative. Synthetic function intact. Bilirubin wnl. Follow up in outpatient setting.     HTN:   Continue home losartan 100mg daily    HLD:  Continue home lipitor 20mg daily     T2DM:   A1c 8.2%, continue metformin 1000mg BID and encourage compliance with semaglutide in outpatient setting     Discharge Condition:  Stable     Discharge Disposition:  Home    -------------------------------------------------------------------------------------------------------------------------------------------    Diabetes.  Sugars controlled so/so on last check on metformin and lower dose ozempic, so we boosted back up. Now on the 0.5mg/wk, mandy well. No trouble getting that. Weight down a notch to 162#.  Hypoglycemia: none. Tolerating current treatment well. A1c in hosp 10/2024 about the same as at summer check, but it's a little soon to recheck from mid AUG.  Current

## 2024-10-28 NOTE — PATIENT INSTRUCTIONS
You can call central scheduling at [phone] to arrange the brain scan study if you don't want to wait for them to call you.    We ordered aspirin 81mg to take daily to help prevent heart attack, and nitroglycerine pills to take as needed for severe chest pressure/ chest pain.

## 2024-10-28 NOTE — PROGRESS NOTES
Katey Sterling is a 84 y.o. female presenting for/with:    Chief Complaint   Patient presents with    Follow-up     ER f/up for chest pain 10/23/2024 at [facility] .... Advised chest pain was stress induced due to eyesite issues and vertigo        Vitals:    10/28/24 1414   BP: 125/71   Site: Left Upper Arm   Position: Sitting   Pulse: 80   Resp: 18   Temp: 97.5 °F (36.4 °C)   TempSrc: Temporal   SpO2: 97%   Weight: 73.6 kg (162 lb 3.2 oz)   Height: 1.575 m (5' 2\")       Pain Scale: 0 - No pain/10  Pain Location:     \"Have you been to the ER, urgent care clinic since your last visit?  Hospitalized since your last visit?\"    YES U 10/22/2024    “Have you seen or consulted any other health care providers outside of [facility] since your last visit?”    NO                 10/28/2024     2:12 PM   PHQ-9    Little interest or pleasure in doing things 0   Feeling down, depressed, or hopeless 0   PHQ-2 Score 0   PHQ-9 Total Score 0           8/14/2024     1:30 PM   [facility] AMB LEARNING ASSESSMENT   Primary Learner Patient   Primary Language ENGLISH   Learning Preference DEMONSTRATION   Answered By patient   Relationship to Learner SELF            8/14/2024     1:20 PM   Amb Fall Risk Assessment and TUG Test   Do you feel unsteady or are you worried about falling?  yes   2 or more falls in past year? no   Fall with injury in past year? no           10/28/2024     2:00 PM 8/14/2024     1:00 PM 2/13/2024     1:00 PM 1/15/2024     1:00 PM 7/10/2023     1:00 PM   ADL ASSESSMENT   Feeding yourself No Help Needed No Help Needed No Help Needed No Help Needed No Help Needed   Getting from bed to chair No Help Needed No Help Needed No Help Needed No Help Needed No Help Needed   Getting dressed No Help Needed No Help Needed No Help Needed No Help Needed No Help Needed   Bathing or showering No Help Needed No Help Needed No Help Needed No Help Needed No Help Needed   Walk across the room (includes cane/walker) No Help Needed

## 2024-10-31 ENCOUNTER — TELEPHONE (OUTPATIENT)
Age: 84
End: 2024-10-31

## 2024-11-01 RX ORDER — MECLIZINE HYDROCHLORIDE 25 MG/1
25 TABLET ORAL 3 TIMES DAILY PRN
Qty: 30 TABLET | Refills: 11 | Status: SHIPPED | OUTPATIENT
Start: 2024-11-01

## 2024-11-07 ENCOUNTER — HOSPITAL ENCOUNTER (OUTPATIENT)
Facility: HOSPITAL | Age: 84
Discharge: HOME | End: 2024-11-10
Attending: FAMILY MEDICINE
Payer: MEDICARE

## 2024-11-07 DIAGNOSIS — R42 VERTIGO: ICD-10-CM

## 2024-11-07 DIAGNOSIS — H53.2 DIPLOPIA: ICD-10-CM

## 2024-11-07 PROCEDURE — A9579 GAD-BASE MR CONTRAST NOS,1ML: HCPCS | Performed by: FAMILY MEDICINE

## 2024-11-07 PROCEDURE — 6360000004 HC RX CONTRAST MEDICATION: Performed by: FAMILY MEDICINE

## 2024-11-07 PROCEDURE — 70553 MRI BRAIN STEM W/O & W/DYE: CPT

## 2024-11-07 RX ADMIN — GADOTERIDOL 15 ML: 279.3 INJECTION, SOLUTION INTRAVENOUS at 10:20

## 2024-11-13 ENCOUNTER — TELEPHONE (OUTPATIENT)
Age: 84
End: 2024-11-13

## 2024-11-13 DIAGNOSIS — H53.2 VERTICAL DIPLOPIA: ICD-10-CM

## 2024-11-13 DIAGNOSIS — H53.9 VISION CHANGES: ICD-10-CM

## 2024-11-13 DIAGNOSIS — D32.0 MENINGIOMA OF CEREBELLUM (HCC): Primary | ICD-10-CM

## 2024-11-13 NOTE — TELEPHONE ENCOUNTER
Vision is worsening, with increased diplopia.Small meningioma present to the cerebellum area:  Homogenously enhancing dural based extra-axial mass lesion at the left posterior  cerebral fossa measures 26 x 16 x 21 mm in craniocaudal dimension lying along  the inferior margin of the tentorium cerebelli. Posterior displacement of the  cerebellum with no associated vasogenic edema. Will set up with [facility] for further mgmt.

## 2024-11-20 ENCOUNTER — TELEPHONE (OUTPATIENT)
Age: 84
End: 2024-11-20

## 2024-12-05 DIAGNOSIS — E11.21 TYPE 2 DIABETES MELLITUS WITH DIABETIC NEPHROPATHY, WITHOUT LONG-TERM CURRENT USE OF INSULIN (HCC): ICD-10-CM

## 2024-12-06 DIAGNOSIS — E11.21 TYPE 2 DIABETES MELLITUS WITH DIABETIC NEPHROPATHY, WITHOUT LONG-TERM CURRENT USE OF INSULIN (HCC): ICD-10-CM

## 2024-12-06 RX ORDER — LOSARTAN POTASSIUM 100 MG/1
TABLET ORAL
Qty: 90 TABLET | Refills: 3 | Status: SHIPPED | OUTPATIENT
Start: 2024-12-06

## 2024-12-09 DIAGNOSIS — E11.21 TYPE 2 DIABETES MELLITUS WITH DIABETIC NEPHROPATHY, WITHOUT LONG-TERM CURRENT USE OF INSULIN (HCC): ICD-10-CM

## 2024-12-09 RX ORDER — METFORMIN HYDROCHLORIDE 500 MG/1
TABLET, EXTENDED RELEASE ORAL
Qty: 360 TABLET | Refills: 3 | Status: SHIPPED | OUTPATIENT
Start: 2024-12-09

## 2024-12-09 RX ORDER — METFORMIN HYDROCHLORIDE 500 MG/1
TABLET, EXTENDED RELEASE ORAL
Qty: 360 TABLET | Refills: 2 | Status: SHIPPED | OUTPATIENT
Start: 2024-12-09 | End: 2024-12-09 | Stop reason: SDUPTHER

## 2024-12-18 ENCOUNTER — TELEPHONE (OUTPATIENT)
Age: 84
End: 2024-12-18

## 2024-12-18 NOTE — TELEPHONE ENCOUNTER
Contacted pt. Saw Neurosurg [facility] with [facility]. Recommending surgery for the meningioma. Saw Cardio [facility] on 12/17. Noted abn stress test 10/24/24 with reversible lateral defect. Plans cardiac cath 1/3/25 at [facility], possible stent. After stabilized, then will prepare for brain surgery.

## 2025-01-13 ENCOUNTER — APPOINTMENT (OUTPATIENT)
Facility: HOSPITAL | Age: 85
DRG: 871 | End: 2025-01-13
Attending: INTERNAL MEDICINE
Payer: MEDICARE

## 2025-01-13 ENCOUNTER — HOSPITAL ENCOUNTER (INPATIENT)
Facility: HOSPITAL | Age: 85
LOS: 8 days | Discharge: HOME HEALTH | DRG: 871 | End: 2025-01-23
Attending: INTERNAL MEDICINE | Admitting: HOSPITALIST
Payer: MEDICARE

## 2025-01-13 DIAGNOSIS — E11.21 TYPE 2 DIABETES MELLITUS WITH DIABETIC NEPHROPATHY, WITHOUT LONG-TERM CURRENT USE OF INSULIN (HCC): Primary | ICD-10-CM

## 2025-01-13 DIAGNOSIS — R94.30 NONSPECIFIC ABNORMAL FUNCTION STUDY, CARDIOVASCULAR: ICD-10-CM

## 2025-01-13 DIAGNOSIS — E11.65 TYPE 2 DIABETES MELLITUS WITH HYPERGLYCEMIA, WITHOUT LONG-TERM CURRENT USE OF INSULIN (HCC): ICD-10-CM

## 2025-01-13 LAB
ANION GAP SERPL CALC-SCNC: 5 MMOL/L (ref 2–12)
ANION GAP SERPL CALC-SCNC: 8 MMOL/L (ref 2–12)
ARTERIAL PATENCY WRIST A: YES
BASE DEFICIT BLDA-SCNC: 1.9 MMOL/L
BASOPHILS # BLD: 0.03 K/UL (ref 0–0.1)
BASOPHILS NFR BLD: 0.3 % (ref 0–1)
BDY SITE: NORMAL
BUN SERPL-MCNC: 14 MG/DL (ref 6–20)
BUN SERPL-MCNC: 14 MG/DL (ref 6–20)
BUN/CREAT SERPL: 19 (ref 12–20)
BUN/CREAT SERPL: 22 (ref 12–20)
CALCIUM SERPL-MCNC: 9.5 MG/DL (ref 8.5–10.1)
CALCIUM SERPL-MCNC: 9.9 MG/DL (ref 8.5–10.1)
CHLORIDE SERPL-SCNC: 103 MMOL/L (ref 97–108)
CHLORIDE SERPL-SCNC: 106 MMOL/L (ref 97–108)
CO2 SERPL-SCNC: 27 MMOL/L (ref 21–32)
CO2 SERPL-SCNC: 30 MMOL/L (ref 21–32)
CREAT SERPL-MCNC: 0.65 MG/DL (ref 0.55–1.02)
CREAT SERPL-MCNC: 0.72 MG/DL (ref 0.55–1.02)
DIFFERENTIAL METHOD BLD: ABNORMAL
ECHO BSA: 1.79 M2
EOSINOPHIL # BLD: 0.03 K/UL (ref 0–0.4)
EOSINOPHIL NFR BLD: 0.3 % (ref 0–7)
ERYTHROCYTE [DISTWIDTH] IN BLOOD BY AUTOMATED COUNT: 13 % (ref 11.5–14.5)
GLUCOSE BLD STRIP.AUTO-MCNC: 149 MG/DL (ref 65–117)
GLUCOSE BLD STRIP.AUTO-MCNC: 299 MG/DL (ref 65–117)
GLUCOSE SERPL-MCNC: 147 MG/DL (ref 65–100)
GLUCOSE SERPL-MCNC: 256 MG/DL (ref 65–100)
HCO3 BLDA-SCNC: 24 MMOL/L (ref 22–26)
HCT VFR BLD AUTO: 38.4 % (ref 35–47)
HGB BLD-MCNC: 12.4 G/DL (ref 11.5–16)
IMM GRANULOCYTES # BLD AUTO: 0.1 K/UL (ref 0–0.04)
IMM GRANULOCYTES NFR BLD AUTO: 1.1 % (ref 0–0.5)
INR PPP: 1 (ref 0.9–1.1)
LYMPHOCYTES # BLD: 1.03 K/UL (ref 0.8–3.5)
LYMPHOCYTES NFR BLD: 11.2 % (ref 12–49)
MCH RBC QN AUTO: 29.5 PG (ref 26–34)
MCHC RBC AUTO-ENTMCNC: 32.3 G/DL (ref 30–36.5)
MCV RBC AUTO: 91.2 FL (ref 80–99)
MONOCYTES # BLD: 0.46 K/UL (ref 0–1)
MONOCYTES NFR BLD: 5 % (ref 5–13)
NEUTS SEG # BLD: 7.53 K/UL (ref 1.8–8)
NEUTS SEG NFR BLD: 82.1 % (ref 32–75)
NRBC # BLD: 0 K/UL (ref 0–0.01)
NRBC BLD-RTO: 0 PER 100 WBC
PCO2 BLDA: 42 MMHG (ref 35–45)
PH BLDA: 7.36 (ref 7.35–7.45)
PLATELET # BLD AUTO: 189 K/UL (ref 150–400)
PMV BLD AUTO: 10.5 FL (ref 8.9–12.9)
PO2 BLDA: 81 MMHG (ref 80–100)
POTASSIUM SERPL-SCNC: 3.7 MMOL/L (ref 3.5–5.1)
POTASSIUM SERPL-SCNC: 4.1 MMOL/L (ref 3.5–5.1)
PROTHROMBIN TIME: 10.6 SEC (ref 9–11.1)
RBC # BLD AUTO: 4.21 M/UL (ref 3.8–5.2)
SAO2 % BLD: 96 % (ref 92–97)
SAO2% DEVICE SAO2% SENSOR NAME: NORMAL
SERVICE CMNT-IMP: ABNORMAL
SERVICE CMNT-IMP: ABNORMAL
SODIUM SERPL-SCNC: 138 MMOL/L (ref 136–145)
SODIUM SERPL-SCNC: 141 MMOL/L (ref 136–145)
SPECIMEN SITE: NORMAL
TROPONIN I SERPL HS-MCNC: 21 NG/L (ref 0–51)
WBC # BLD AUTO: 9.2 K/UL (ref 3.6–11)

## 2025-01-13 PROCEDURE — 2500000003 HC RX 250 WO HCPCS: Performed by: INTERNAL MEDICINE

## 2025-01-13 PROCEDURE — 6360000002 HC RX W HCPCS: Performed by: NURSE PRACTITIONER

## 2025-01-13 PROCEDURE — C1894 INTRO/SHEATH, NON-LASER: HCPCS | Performed by: INTERNAL MEDICINE

## 2025-01-13 PROCEDURE — 36415 COLL VENOUS BLD VENIPUNCTURE: CPT

## 2025-01-13 PROCEDURE — 6360000004 HC RX CONTRAST MEDICATION: Performed by: INTERNAL MEDICINE

## 2025-01-13 PROCEDURE — 84484 ASSAY OF TROPONIN QUANT: CPT

## 2025-01-13 PROCEDURE — 6360000002 HC RX W HCPCS: Performed by: INTERNAL MEDICINE

## 2025-01-13 PROCEDURE — 2709999900 HC NON-CHARGEABLE SUPPLY: Performed by: INTERNAL MEDICINE

## 2025-01-13 PROCEDURE — 71045 X-RAY EXAM CHEST 1 VIEW: CPT

## 2025-01-13 PROCEDURE — 6370000000 HC RX 637 (ALT 250 FOR IP): Performed by: INTERNAL MEDICINE

## 2025-01-13 PROCEDURE — 99152 MOD SED SAME PHYS/QHP 5/>YRS: CPT | Performed by: INTERNAL MEDICINE

## 2025-01-13 PROCEDURE — 82803 BLOOD GASES ANY COMBINATION: CPT

## 2025-01-13 PROCEDURE — 99153 MOD SED SAME PHYS/QHP EA: CPT | Performed by: INTERNAL MEDICINE

## 2025-01-13 PROCEDURE — 93458 L HRT ARTERY/VENTRICLE ANGIO: CPT | Performed by: INTERNAL MEDICINE

## 2025-01-13 PROCEDURE — 6370000000 HC RX 637 (ALT 250 FOR IP): Performed by: NURSE PRACTITIONER

## 2025-01-13 PROCEDURE — B2111ZZ FLUOROSCOPY OF MULTIPLE CORONARY ARTERIES USING LOW OSMOLAR CONTRAST: ICD-10-PCS | Performed by: INTERNAL MEDICINE

## 2025-01-13 PROCEDURE — 93005 ELECTROCARDIOGRAM TRACING: CPT | Performed by: NURSE PRACTITIONER

## 2025-01-13 PROCEDURE — 80048 BASIC METABOLIC PNL TOTAL CA: CPT

## 2025-01-13 PROCEDURE — 82962 GLUCOSE BLOOD TEST: CPT

## 2025-01-13 PROCEDURE — 85025 COMPLETE CBC W/AUTO DIFF WBC: CPT

## 2025-01-13 PROCEDURE — 85610 PROTHROMBIN TIME: CPT

## 2025-01-13 PROCEDURE — 4A023N7 MEASUREMENT OF CARDIAC SAMPLING AND PRESSURE, LEFT HEART, PERCUTANEOUS APPROACH: ICD-10-PCS | Performed by: INTERNAL MEDICINE

## 2025-01-13 PROCEDURE — C1769 GUIDE WIRE: HCPCS | Performed by: INTERNAL MEDICINE

## 2025-01-13 PROCEDURE — 70450 CT HEAD/BRAIN W/O DYE: CPT

## 2025-01-13 PROCEDURE — 36600 WITHDRAWAL OF ARTERIAL BLOOD: CPT

## 2025-01-13 RX ORDER — GLUCAGON 1 MG/ML
1 KIT INJECTION PRN
Status: DISCONTINUED | OUTPATIENT
Start: 2025-01-13 | End: 2025-01-14

## 2025-01-13 RX ORDER — LOSARTAN POTASSIUM 100 MG/1
100 TABLET ORAL DAILY
Status: DISCONTINUED | OUTPATIENT
Start: 2025-01-13 | End: 2025-01-23 | Stop reason: HOSPADM

## 2025-01-13 RX ORDER — INSULIN LISPRO 100 [IU]/ML
0-4 INJECTION, SOLUTION INTRAVENOUS; SUBCUTANEOUS
Status: DISCONTINUED | OUTPATIENT
Start: 2025-01-13 | End: 2025-01-14

## 2025-01-13 RX ORDER — LORAZEPAM 2 MG/ML
0.5 INJECTION INTRAMUSCULAR ONCE
Status: COMPLETED | OUTPATIENT
Start: 2025-01-13 | End: 2025-01-13

## 2025-01-13 RX ORDER — SODIUM CHLORIDE 0.9 % (FLUSH) 0.9 %
5-40 SYRINGE (ML) INJECTION EVERY 12 HOURS SCHEDULED
Status: DISCONTINUED | OUTPATIENT
Start: 2025-01-13 | End: 2025-01-23 | Stop reason: HOSPADM

## 2025-01-13 RX ORDER — ATORVASTATIN CALCIUM 20 MG/1
20 TABLET, FILM COATED ORAL DAILY
Status: DISCONTINUED | OUTPATIENT
Start: 2025-01-13 | End: 2025-01-23 | Stop reason: HOSPADM

## 2025-01-13 RX ORDER — NITROGLYCERIN 0.4 MG/1
0.4 TABLET SUBLINGUAL EVERY 5 MIN PRN
Status: DISCONTINUED | OUTPATIENT
Start: 2025-01-13 | End: 2025-01-23 | Stop reason: HOSPADM

## 2025-01-13 RX ORDER — LABETALOL HYDROCHLORIDE 5 MG/ML
5 INJECTION, SOLUTION INTRAVENOUS EVERY 6 HOURS PRN
Status: DISCONTINUED | OUTPATIENT
Start: 2025-01-13 | End: 2025-01-17

## 2025-01-13 RX ORDER — ACETAMINOPHEN 325 MG/1
650 TABLET ORAL EVERY 4 HOURS PRN
Status: DISCONTINUED | OUTPATIENT
Start: 2025-01-13 | End: 2025-01-13

## 2025-01-13 RX ORDER — ACETAMINOPHEN 325 MG/1
650 TABLET ORAL EVERY 4 HOURS PRN
Status: DISCONTINUED | OUTPATIENT
Start: 2025-01-13 | End: 2025-01-14

## 2025-01-13 RX ORDER — ASPIRIN 325 MG
325 TABLET ORAL ONCE
Status: DISCONTINUED | OUTPATIENT
Start: 2025-01-13 | End: 2025-01-13

## 2025-01-13 RX ORDER — FENTANYL CITRATE 50 UG/ML
INJECTION, SOLUTION INTRAMUSCULAR; INTRAVENOUS PRN
Status: DISCONTINUED | OUTPATIENT
Start: 2025-01-13 | End: 2025-01-13 | Stop reason: HOSPADM

## 2025-01-13 RX ORDER — IOPAMIDOL 755 MG/ML
INJECTION, SOLUTION INTRAVASCULAR PRN
Status: DISCONTINUED | OUTPATIENT
Start: 2025-01-13 | End: 2025-01-13 | Stop reason: HOSPADM

## 2025-01-13 RX ORDER — MECLIZINE HYDROCHLORIDE 25 MG/1
25 TABLET ORAL 3 TIMES DAILY PRN
Status: DISCONTINUED | OUTPATIENT
Start: 2025-01-13 | End: 2025-01-23 | Stop reason: HOSPADM

## 2025-01-13 RX ORDER — ONDANSETRON 2 MG/ML
4 INJECTION INTRAMUSCULAR; INTRAVENOUS EVERY 6 HOURS PRN
Status: DISCONTINUED | OUTPATIENT
Start: 2025-01-13 | End: 2025-01-23 | Stop reason: HOSPADM

## 2025-01-13 RX ORDER — VERAPAMIL HYDROCHLORIDE 2.5 MG/ML
INJECTION, SOLUTION INTRAVENOUS PRN
Status: DISCONTINUED | OUTPATIENT
Start: 2025-01-13 | End: 2025-01-13 | Stop reason: HOSPADM

## 2025-01-13 RX ORDER — SODIUM CHLORIDE 9 MG/ML
INJECTION, SOLUTION INTRAVENOUS PRN
Status: DISCONTINUED | OUTPATIENT
Start: 2025-01-13 | End: 2025-01-23 | Stop reason: HOSPADM

## 2025-01-13 RX ORDER — DEXTROSE MONOHYDRATE 100 MG/ML
INJECTION, SOLUTION INTRAVENOUS CONTINUOUS PRN
Status: DISCONTINUED | OUTPATIENT
Start: 2025-01-13 | End: 2025-01-15 | Stop reason: SDUPTHER

## 2025-01-13 RX ORDER — LIDOCAINE HYDROCHLORIDE 10 MG/ML
INJECTION, SOLUTION INFILTRATION; PERINEURAL PRN
Status: DISCONTINUED | OUTPATIENT
Start: 2025-01-13 | End: 2025-01-13 | Stop reason: HOSPADM

## 2025-01-13 RX ORDER — HEPARIN SODIUM 10000 [USP'U]/ML
INJECTION, SOLUTION INTRAVENOUS; SUBCUTANEOUS PRN
Status: DISCONTINUED | OUTPATIENT
Start: 2025-01-13 | End: 2025-01-13 | Stop reason: HOSPADM

## 2025-01-13 RX ORDER — ASPIRIN 81 MG/1
81 TABLET ORAL DAILY
Status: DISCONTINUED | OUTPATIENT
Start: 2025-01-14 | End: 2025-01-23 | Stop reason: HOSPADM

## 2025-01-13 RX ORDER — SODIUM CHLORIDE 0.9 % (FLUSH) 0.9 %
5-40 SYRINGE (ML) INJECTION PRN
Status: DISCONTINUED | OUTPATIENT
Start: 2025-01-13 | End: 2025-01-23 | Stop reason: HOSPADM

## 2025-01-13 RX ORDER — ENOXAPARIN SODIUM 100 MG/ML
40 INJECTION SUBCUTANEOUS DAILY
Status: DISCONTINUED | OUTPATIENT
Start: 2025-01-14 | End: 2025-01-23 | Stop reason: HOSPADM

## 2025-01-13 RX ORDER — HYDRALAZINE HYDROCHLORIDE 20 MG/ML
10 INJECTION INTRAMUSCULAR; INTRAVENOUS EVERY 4 HOURS PRN
Status: DISCONTINUED | OUTPATIENT
Start: 2025-01-13 | End: 2025-01-13 | Stop reason: HOSPADM

## 2025-01-13 RX ORDER — ASPIRIN 81 MG/1
81 TABLET ORAL DAILY
Status: DISCONTINUED | OUTPATIENT
Start: 2025-01-13 | End: 2025-01-13

## 2025-01-13 RX ORDER — HEPARIN SODIUM 1000 [USP'U]/ML
INJECTION, SOLUTION INTRAVENOUS; SUBCUTANEOUS PRN
Status: DISCONTINUED | OUTPATIENT
Start: 2025-01-13 | End: 2025-01-13 | Stop reason: HOSPADM

## 2025-01-13 RX ADMIN — LORAZEPAM 0.5 MG: 2 INJECTION INTRAMUSCULAR; INTRAVENOUS at 23:11

## 2025-01-13 RX ADMIN — LABETALOL HYDROCHLORIDE 5 MG: 5 INJECTION, SOLUTION INTRAVENOUS at 23:11

## 2025-01-13 RX ADMIN — INSULIN LISPRO 2 UNITS: 100 INJECTION, SOLUTION INTRAVENOUS; SUBCUTANEOUS at 23:11

## 2025-01-13 RX ADMIN — LORAZEPAM 0.5 MG: 2 INJECTION INTRAMUSCULAR; INTRAVENOUS at 19:15

## 2025-01-13 RX ADMIN — ONDANSETRON 4 MG: 2 INJECTION INTRAMUSCULAR; INTRAVENOUS at 23:11

## 2025-01-14 ENCOUNTER — APPOINTMENT (OUTPATIENT)
Facility: HOSPITAL | Age: 85
DRG: 871 | End: 2025-01-14
Attending: INTERNAL MEDICINE
Payer: MEDICARE

## 2025-01-14 LAB
ALBUMIN SERPL-MCNC: 3.6 G/DL (ref 3.5–5)
ALBUMIN SERPL-MCNC: 3.8 G/DL (ref 3.5–5)
ALBUMIN/GLOB SERPL: 1 (ref 1.1–2.2)
ALBUMIN/GLOB SERPL: 1 (ref 1.1–2.2)
ALP SERPL-CCNC: 108 U/L (ref 45–117)
ALP SERPL-CCNC: 121 U/L (ref 45–117)
ALT SERPL-CCNC: 28 U/L (ref 12–78)
ALT SERPL-CCNC: 36 U/L (ref 12–78)
AMMONIA PLAS-SCNC: 14 UMOL/L
ANION GAP SERPL CALC-SCNC: 10 MMOL/L (ref 2–12)
ANION GAP SERPL CALC-SCNC: 9 MMOL/L (ref 2–12)
ANION GAP SERPL CALC-SCNC: 9 MMOL/L (ref 2–12)
APPEARANCE UR: CLEAR
APTT PPP: 22.9 SEC (ref 22.1–31)
ARTERIAL PATENCY WRIST A: ABNORMAL
ARTERIAL PATENCY WRIST A: YES
AST SERPL-CCNC: 30 U/L (ref 15–37)
AST SERPL-CCNC: 30 U/L (ref 15–37)
BACTERIA URNS QL MICRO: NEGATIVE /HPF
BASE DEFICIT BLDA-SCNC: 6.5 MMOL/L
BASE DEFICIT BLDV-SCNC: 4.4 MMOL/L
BASOPHILS # BLD: 0.03 K/UL (ref 0–0.1)
BASOPHILS # BLD: 0.03 K/UL (ref 0–0.1)
BASOPHILS NFR BLD: 0.2 % (ref 0–1)
BASOPHILS NFR BLD: 0.2 % (ref 0–1)
BDY SITE: ABNORMAL
BILIRUB DIRECT SERPL-MCNC: 0.2 MG/DL (ref 0–0.2)
BILIRUB SERPL-MCNC: 0.7 MG/DL (ref 0.2–1)
BILIRUB SERPL-MCNC: 0.9 MG/DL (ref 0.2–1)
BILIRUB UR QL: NEGATIVE
BUN SERPL-MCNC: 17 MG/DL (ref 6–20)
BUN SERPL-MCNC: 31 MG/DL (ref 6–20)
BUN SERPL-MCNC: 33 MG/DL (ref 6–20)
BUN/CREAT SERPL: 20 (ref 12–20)
BUN/CREAT SERPL: 25 (ref 12–20)
BUN/CREAT SERPL: 26 (ref 12–20)
CALCIUM SERPL-MCNC: 10.1 MG/DL (ref 8.5–10.1)
CALCIUM SERPL-MCNC: 9.5 MG/DL (ref 8.5–10.1)
CALCIUM SERPL-MCNC: 9.7 MG/DL (ref 8.5–10.1)
CHLORIDE SERPL-SCNC: 104 MMOL/L (ref 97–108)
CHLORIDE SERPL-SCNC: 108 MMOL/L (ref 97–108)
CHLORIDE SERPL-SCNC: 108 MMOL/L (ref 97–108)
CK SERPL-CCNC: 419 U/L (ref 26–192)
CO2 SERPL-SCNC: 21 MMOL/L (ref 21–32)
CO2 SERPL-SCNC: 21 MMOL/L (ref 21–32)
CO2 SERPL-SCNC: 22 MMOL/L (ref 21–32)
COLOR UR: ABNORMAL
COMMENT:: NORMAL
CREAT SERPL-MCNC: 0.86 MG/DL (ref 0.55–1.02)
CREAT SERPL-MCNC: 1.22 MG/DL (ref 0.55–1.02)
CREAT SERPL-MCNC: 1.29 MG/DL (ref 0.55–1.02)
CRP SERPL-MCNC: <0.29 MG/DL (ref 0–0.3)
D DIMER PPP FEU-MCNC: 2.27 MG/L FEU (ref 0–0.65)
DIFFERENTIAL METHOD BLD: ABNORMAL
DIFFERENTIAL METHOD BLD: ABNORMAL
EOSINOPHIL # BLD: 0 K/UL (ref 0–0.4)
EOSINOPHIL # BLD: 0.19 K/UL (ref 0–0.4)
EOSINOPHIL NFR BLD: 0 % (ref 0–7)
EOSINOPHIL NFR BLD: 1 % (ref 0–7)
EPITH CASTS URNS QL MICRO: ABNORMAL /LPF
ERYTHROCYTE [DISTWIDTH] IN BLOOD BY AUTOMATED COUNT: 12.9 % (ref 11.5–14.5)
ERYTHROCYTE [DISTWIDTH] IN BLOOD BY AUTOMATED COUNT: 13.3 % (ref 11.5–14.5)
ERYTHROCYTE [SEDIMENTATION RATE] IN BLOOD: 14 MM/HR (ref 0–30)
FLUAV RNA SPEC QL NAA+PROBE: NOT DETECTED
FLUBV RNA SPEC QL NAA+PROBE: NOT DETECTED
GAS FLOW.O2 O2 DELIVERY SYS: ABNORMAL
GAS FLOW.O2 SETTING OXYMISER: 2
GLOBULIN SER CALC-MCNC: 3.6 G/DL (ref 2–4)
GLOBULIN SER CALC-MCNC: 4 G/DL (ref 2–4)
GLUCOSE BLD STRIP.AUTO-MCNC: 135 MG/DL (ref 65–117)
GLUCOSE BLD STRIP.AUTO-MCNC: 333 MG/DL (ref 65–117)
GLUCOSE BLD STRIP.AUTO-MCNC: 341 MG/DL (ref 65–117)
GLUCOSE BLD STRIP.AUTO-MCNC: 355 MG/DL (ref 65–117)
GLUCOSE BLD STRIP.AUTO-MCNC: 372 MG/DL (ref 65–117)
GLUCOSE BLD STRIP.AUTO-MCNC: 387 MG/DL (ref 65–117)
GLUCOSE BLD STRIP.AUTO-MCNC: 423 MG/DL (ref 65–117)
GLUCOSE BLD STRIP.AUTO-MCNC: 446 MG/DL (ref 65–117)
GLUCOSE SERPL-MCNC: 313 MG/DL (ref 65–100)
GLUCOSE SERPL-MCNC: 432 MG/DL (ref 65–100)
GLUCOSE SERPL-MCNC: 444 MG/DL (ref 65–100)
GLUCOSE UR STRIP.AUTO-MCNC: >1000 MG/DL
HCO3 BLDA-SCNC: 18 MMOL/L (ref 22–26)
HCO3 BLDV-SCNC: 19.4 MMOL/L (ref 23–28)
HCT VFR BLD AUTO: 42.6 % (ref 35–47)
HCT VFR BLD AUTO: 43 % (ref 35–47)
HGB BLD-MCNC: 14 G/DL (ref 11.5–16)
HGB BLD-MCNC: 14.1 G/DL (ref 11.5–16)
HGB UR QL STRIP: ABNORMAL
IMM GRANULOCYTES # BLD AUTO: 0.1 K/UL (ref 0–0.04)
IMM GRANULOCYTES # BLD AUTO: 0.21 K/UL (ref 0–0.04)
IMM GRANULOCYTES NFR BLD AUTO: 0.7 % (ref 0–0.5)
IMM GRANULOCYTES NFR BLD AUTO: 1.1 % (ref 0–0.5)
INR PPP: 1 (ref 0.9–1.1)
KETONES UR QL STRIP.AUTO: 40 MG/DL
LACTATE SERPL-SCNC: 1.8 MMOL/L (ref 0.4–2)
LACTATE SERPL-SCNC: 2.9 MMOL/L (ref 0.4–2)
LEUKOCYTE ESTERASE UR QL STRIP.AUTO: NEGATIVE
LYMPHOCYTES # BLD: 0.63 K/UL (ref 0.8–3.5)
LYMPHOCYTES # BLD: 1.13 K/UL (ref 0.8–3.5)
LYMPHOCYTES NFR BLD: 4.2 % (ref 12–49)
LYMPHOCYTES NFR BLD: 6.1 % (ref 12–49)
MAGNESIUM SERPL-MCNC: 1.8 MG/DL (ref 1.6–2.4)
MCH RBC QN AUTO: 29.9 PG (ref 26–34)
MCH RBC QN AUTO: 30 PG (ref 26–34)
MCHC RBC AUTO-ENTMCNC: 32.6 G/DL (ref 30–36.5)
MCHC RBC AUTO-ENTMCNC: 33.1 G/DL (ref 30–36.5)
MCV RBC AUTO: 90.3 FL (ref 80–99)
MCV RBC AUTO: 92.1 FL (ref 80–99)
MONOCYTES # BLD: 0.27 K/UL (ref 0–1)
MONOCYTES # BLD: 0.92 K/UL (ref 0–1)
MONOCYTES NFR BLD: 1.8 % (ref 5–13)
MONOCYTES NFR BLD: 5 % (ref 5–13)
NEUTS SEG # BLD: 13.87 K/UL (ref 1.8–8)
NEUTS SEG # BLD: 15.94 K/UL (ref 1.8–8)
NEUTS SEG NFR BLD: 86.6 % (ref 32–75)
NEUTS SEG NFR BLD: 93.1 % (ref 32–75)
NITRITE UR QL STRIP.AUTO: NEGATIVE
NRBC # BLD: 0 K/UL (ref 0–0.01)
NRBC # BLD: 0 K/UL (ref 0–0.01)
NRBC BLD-RTO: 0 PER 100 WBC
NRBC BLD-RTO: 0 PER 100 WBC
O2/TOTAL GAS SETTING VFR VENT: 2 %
PCO2 BLDA: 34 MMHG (ref 35–45)
PCO2 BLDV: 31.8 MMHG (ref 41–51)
PH BLDA: 7.34 (ref 7.35–7.45)
PH BLDV: 7.39 (ref 7.32–7.42)
PH UR STRIP: 6 (ref 5–8)
PHOSPHATE SERPL-MCNC: 2.7 MG/DL (ref 2.6–4.7)
PLATELET # BLD AUTO: 205 K/UL (ref 150–400)
PLATELET # BLD AUTO: 285 K/UL (ref 150–400)
PMV BLD AUTO: 10.2 FL (ref 8.9–12.9)
PO2 BLDA: 79 MMHG (ref 80–100)
PO2 BLDV: 70 MMHG (ref 25–40)
POTASSIUM SERPL-SCNC: 3.4 MMOL/L (ref 3.5–5.1)
POTASSIUM SERPL-SCNC: 3.5 MMOL/L (ref 3.5–5.1)
POTASSIUM SERPL-SCNC: 4.2 MMOL/L (ref 3.5–5.1)
PROCALCITONIN SERPL-MCNC: 0.16 NG/ML
PROCALCITONIN SERPL-MCNC: 0.26 NG/ML
PROT SERPL-MCNC: 7.2 G/DL (ref 6.4–8.2)
PROT SERPL-MCNC: 7.8 G/DL (ref 6.4–8.2)
PROT UR STRIP-MCNC: 300 MG/DL
PROTHROMBIN TIME: 10.9 SEC (ref 9–11.1)
RBC # BLD AUTO: 4.67 M/UL (ref 3.8–5.2)
RBC # BLD AUTO: 4.72 M/UL (ref 3.8–5.2)
RBC #/AREA URNS HPF: ABNORMAL /HPF (ref 0–5)
RBC MORPH BLD: ABNORMAL
SAO2 % BLD: 95 % (ref 92–97)
SAO2 % BLDV: 93.9 % (ref 65–88)
SAO2% DEVICE SAO2% SENSOR NAME: ABNORMAL
SARS-COV-2 RNA RESP QL NAA+PROBE: NOT DETECTED
SERVICE CMNT-IMP: ABNORMAL
SODIUM SERPL-SCNC: 135 MMOL/L (ref 136–145)
SODIUM SERPL-SCNC: 138 MMOL/L (ref 136–145)
SODIUM SERPL-SCNC: 139 MMOL/L (ref 136–145)
SOURCE: NORMAL
SP GR UR REFRACTOMETRY: 1.02
SPECIMEN HOLD: NORMAL
SPECIMEN SITE: ABNORMAL
SPECIMEN TYPE: ABNORMAL
THERAPEUTIC RANGE: NORMAL SECS (ref 58–77)
URINE CULTURE IF INDICATED: ABNORMAL
UROBILINOGEN UR QL STRIP.AUTO: 0.2 EU/DL (ref 0.2–1)
WBC # BLD AUTO: 14.9 K/UL (ref 3.6–11)
WBC # BLD AUTO: 18.4 K/UL (ref 3.6–11)
WBC URNS QL MICRO: ABNORMAL /HPF (ref 0–4)

## 2025-01-14 PROCEDURE — 2700000000 HC OXYGEN THERAPY PER DAY

## 2025-01-14 PROCEDURE — 6360000004 HC RX CONTRAST MEDICATION: Performed by: INTERNAL MEDICINE

## 2025-01-14 PROCEDURE — 87636 SARSCOV2 & INF A&B AMP PRB: CPT

## 2025-01-14 PROCEDURE — 6370000000 HC RX 637 (ALT 250 FOR IP): Performed by: NURSE PRACTITIONER

## 2025-01-14 PROCEDURE — 85610 PROTHROMBIN TIME: CPT

## 2025-01-14 PROCEDURE — 83605 ASSAY OF LACTIC ACID: CPT

## 2025-01-14 PROCEDURE — 2500000003 HC RX 250 WO HCPCS: Performed by: INTERNAL MEDICINE

## 2025-01-14 PROCEDURE — 71275 CT ANGIOGRAPHY CHEST: CPT

## 2025-01-14 PROCEDURE — 6360000002 HC RX W HCPCS: Performed by: INTERNAL MEDICINE

## 2025-01-14 PROCEDURE — 84145 PROCALCITONIN (PCT): CPT

## 2025-01-14 PROCEDURE — 2580000003 HC RX 258: Performed by: INTERNAL MEDICINE

## 2025-01-14 PROCEDURE — 51798 US URINE CAPACITY MEASURE: CPT

## 2025-01-14 PROCEDURE — 80053 COMPREHEN METABOLIC PANEL: CPT

## 2025-01-14 PROCEDURE — 87040 BLOOD CULTURE FOR BACTERIA: CPT

## 2025-01-14 PROCEDURE — 2500000003 HC RX 250 WO HCPCS: Performed by: NURSE PRACTITIONER

## 2025-01-14 PROCEDURE — 85652 RBC SED RATE AUTOMATED: CPT

## 2025-01-14 PROCEDURE — 2580000003 HC RX 258: Performed by: NURSE PRACTITIONER

## 2025-01-14 PROCEDURE — 85730 THROMBOPLASTIN TIME PARTIAL: CPT

## 2025-01-14 PROCEDURE — 74177 CT ABD & PELVIS W/CONTRAST: CPT

## 2025-01-14 PROCEDURE — 82803 BLOOD GASES ANY COMBINATION: CPT

## 2025-01-14 PROCEDURE — 86140 C-REACTIVE PROTEIN: CPT

## 2025-01-14 PROCEDURE — 83735 ASSAY OF MAGNESIUM: CPT

## 2025-01-14 PROCEDURE — 2580000003 HC RX 258: Performed by: STUDENT IN AN ORGANIZED HEALTH CARE EDUCATION/TRAINING PROGRAM

## 2025-01-14 PROCEDURE — 51701 INSERT BLADDER CATHETER: CPT

## 2025-01-14 PROCEDURE — 6360000002 HC RX W HCPCS: Performed by: STUDENT IN AN ORGANIZED HEALTH CARE EDUCATION/TRAINING PROGRAM

## 2025-01-14 PROCEDURE — 6360000002 HC RX W HCPCS: Performed by: NURSE PRACTITIONER

## 2025-01-14 PROCEDURE — 36600 WITHDRAWAL OF ARTERIAL BLOOD: CPT

## 2025-01-14 PROCEDURE — 85379 FIBRIN DEGRADATION QUANT: CPT

## 2025-01-14 PROCEDURE — 85025 COMPLETE CBC W/AUTO DIFF WBC: CPT

## 2025-01-14 PROCEDURE — 81001 URINALYSIS AUTO W/SCOPE: CPT

## 2025-01-14 PROCEDURE — 82550 ASSAY OF CK (CPK): CPT

## 2025-01-14 PROCEDURE — 70551 MRI BRAIN STEM W/O DYE: CPT

## 2025-01-14 PROCEDURE — 84443 ASSAY THYROID STIM HORMONE: CPT

## 2025-01-14 PROCEDURE — 80076 HEPATIC FUNCTION PANEL: CPT

## 2025-01-14 PROCEDURE — 6370000000 HC RX 637 (ALT 250 FOR IP): Performed by: INTERNAL MEDICINE

## 2025-01-14 PROCEDURE — 80048 BASIC METABOLIC PNL TOTAL CA: CPT

## 2025-01-14 PROCEDURE — 6370000000 HC RX 637 (ALT 250 FOR IP): Performed by: STUDENT IN AN ORGANIZED HEALTH CARE EDUCATION/TRAINING PROGRAM

## 2025-01-14 PROCEDURE — 83036 HEMOGLOBIN GLYCOSYLATED A1C: CPT

## 2025-01-14 PROCEDURE — 84100 ASSAY OF PHOSPHORUS: CPT

## 2025-01-14 PROCEDURE — 36415 COLL VENOUS BLD VENIPUNCTURE: CPT

## 2025-01-14 PROCEDURE — 82140 ASSAY OF AMMONIA: CPT

## 2025-01-14 RX ORDER — GLUCAGON 1 MG/ML
1 KIT INJECTION PRN
Status: DISCONTINUED | OUTPATIENT
Start: 2025-01-14 | End: 2025-01-23 | Stop reason: HOSPADM

## 2025-01-14 RX ORDER — IOPAMIDOL 755 MG/ML
100 INJECTION, SOLUTION INTRAVASCULAR
Status: COMPLETED | OUTPATIENT
Start: 2025-01-14 | End: 2025-01-14

## 2025-01-14 RX ORDER — INSULIN GLARGINE 100 [IU]/ML
10 INJECTION, SOLUTION SUBCUTANEOUS DAILY
Status: DISCONTINUED | OUTPATIENT
Start: 2025-01-14 | End: 2025-01-14

## 2025-01-14 RX ORDER — DEXTROSE MONOHYDRATE 100 MG/ML
INJECTION, SOLUTION INTRAVENOUS CONTINUOUS PRN
Status: DISCONTINUED | OUTPATIENT
Start: 2025-01-14 | End: 2025-01-23 | Stop reason: HOSPADM

## 2025-01-14 RX ORDER — VANCOMYCIN 1.75 G/350ML
1250 INJECTION, SOLUTION INTRAVENOUS EVERY 12 HOURS
Status: DISCONTINUED | OUTPATIENT
Start: 2025-01-14 | End: 2025-01-14

## 2025-01-14 RX ORDER — GUAIFENESIN 200 MG/10ML
200 LIQUID ORAL EVERY 4 HOURS PRN
Status: DISCONTINUED | OUTPATIENT
Start: 2025-01-14 | End: 2025-01-23 | Stop reason: HOSPADM

## 2025-01-14 RX ORDER — SODIUM CHLORIDE, SODIUM LACTATE, POTASSIUM CHLORIDE, CALCIUM CHLORIDE 600; 310; 30; 20 MG/100ML; MG/100ML; MG/100ML; MG/100ML
INJECTION, SOLUTION INTRAVENOUS CONTINUOUS
Status: DISCONTINUED | OUTPATIENT
Start: 2025-01-14 | End: 2025-01-16

## 2025-01-14 RX ORDER — ACETAMINOPHEN 325 MG/1
650 TABLET ORAL EVERY 4 HOURS PRN
Status: DISCONTINUED | OUTPATIENT
Start: 2025-01-14 | End: 2025-01-23 | Stop reason: HOSPADM

## 2025-01-14 RX ORDER — 0.9 % SODIUM CHLORIDE 0.9 %
30 INTRAVENOUS SOLUTION INTRAVENOUS ONCE
Status: COMPLETED | OUTPATIENT
Start: 2025-01-14 | End: 2025-01-14

## 2025-01-14 RX ORDER — POTASSIUM CHLORIDE 7.45 MG/ML
10 INJECTION INTRAVENOUS
Status: COMPLETED | OUTPATIENT
Start: 2025-01-14 | End: 2025-01-15

## 2025-01-14 RX ORDER — GUAIFENESIN 600 MG/1
600 TABLET, EXTENDED RELEASE ORAL 2 TIMES DAILY
Status: DISCONTINUED | OUTPATIENT
Start: 2025-01-14 | End: 2025-01-23 | Stop reason: HOSPADM

## 2025-01-14 RX ORDER — ACETAMINOPHEN 650 MG/1
650 SUPPOSITORY RECTAL EVERY 4 HOURS PRN
Status: DISCONTINUED | OUTPATIENT
Start: 2025-01-14 | End: 2025-01-23 | Stop reason: HOSPADM

## 2025-01-14 RX ORDER — SODIUM CHLORIDE 9 MG/ML
INJECTION, SOLUTION INTRAVENOUS CONTINUOUS
Status: DISCONTINUED | OUTPATIENT
Start: 2025-01-14 | End: 2025-01-14

## 2025-01-14 RX ORDER — METOPROLOL TARTRATE 1 MG/ML
5 INJECTION, SOLUTION INTRAVENOUS EVERY 6 HOURS
Status: DISCONTINUED | OUTPATIENT
Start: 2025-01-14 | End: 2025-01-14 | Stop reason: ALTCHOICE

## 2025-01-14 RX ORDER — INSULIN LISPRO 100 [IU]/ML
0-8 INJECTION, SOLUTION INTRAVENOUS; SUBCUTANEOUS EVERY 4 HOURS
Status: DISCONTINUED | OUTPATIENT
Start: 2025-01-14 | End: 2025-01-14

## 2025-01-14 RX ORDER — DEXAMETHASONE SODIUM PHOSPHATE 10 MG/ML
10 INJECTION, SOLUTION INTRAMUSCULAR; INTRAVENOUS EVERY 6 HOURS
Status: DISCONTINUED | OUTPATIENT
Start: 2025-01-14 | End: 2025-01-16

## 2025-01-14 RX ORDER — ESMOLOL HYDROCHLORIDE 10 MG/ML
25-300 INJECTION, SOLUTION INTRAVENOUS CONTINUOUS
Status: DISCONTINUED | OUTPATIENT
Start: 2025-01-14 | End: 2025-01-14 | Stop reason: ALTCHOICE

## 2025-01-14 RX ADMIN — POTASSIUM CHLORIDE 10 MEQ: 7.46 INJECTION, SOLUTION INTRAVENOUS at 23:00

## 2025-01-14 RX ADMIN — DEXAMETHASONE SODIUM PHOSPHATE 10 MG: 10 INJECTION, SOLUTION INTRAMUSCULAR; INTRAVENOUS at 23:13

## 2025-01-14 RX ADMIN — AMPICILLIN SODIUM 2000 MG: 2 INJECTION, POWDER, FOR SOLUTION INTRAMUSCULAR; INTRAVENOUS at 23:22

## 2025-01-14 RX ADMIN — ACETAMINOPHEN 650 MG: 650 SUPPOSITORY RECTAL at 04:03

## 2025-01-14 RX ADMIN — PIPERACILLIN AND TAZOBACTAM 3375 MG: 3; .375 INJECTION, POWDER, LYOPHILIZED, FOR SOLUTION INTRAVENOUS at 16:37

## 2025-01-14 RX ADMIN — SODIUM CHLORIDE 2205 ML: 0.9 INJECTION, SOLUTION INTRAVENOUS at 06:30

## 2025-01-14 RX ADMIN — SODIUM CHLORIDE, POTASSIUM CHLORIDE, SODIUM LACTATE AND CALCIUM CHLORIDE: 600; 310; 30; 20 INJECTION, SOLUTION INTRAVENOUS at 23:16

## 2025-01-14 RX ADMIN — WATER 2000 MG: 1 INJECTION INTRAMUSCULAR; INTRAVENOUS; SUBCUTANEOUS at 23:01

## 2025-01-14 RX ADMIN — SODIUM CHLORIDE: 9 INJECTION, SOLUTION INTRAVENOUS at 16:36

## 2025-01-14 RX ADMIN — INSULIN LISPRO 4 UNITS: 100 INJECTION, SOLUTION INTRAVENOUS; SUBCUTANEOUS at 18:13

## 2025-01-14 RX ADMIN — Medication 1 AMPULE: at 23:13

## 2025-01-14 RX ADMIN — SODIUM CHLORIDE 5 MG/HR: 9 INJECTION, SOLUTION INTRAVENOUS at 14:28

## 2025-01-14 RX ADMIN — INSULIN GLARGINE 10 UNITS: 100 INJECTION, SOLUTION SUBCUTANEOUS at 14:28

## 2025-01-14 RX ADMIN — IOPAMIDOL 100 ML: 755 INJECTION, SOLUTION INTRAVENOUS at 15:18

## 2025-01-14 RX ADMIN — VANCOMYCIN HYDROCHLORIDE 2000 MG: 10 INJECTION, POWDER, LYOPHILIZED, FOR SOLUTION INTRAVENOUS at 10:29

## 2025-01-14 RX ADMIN — ACETAMINOPHEN 650 MG: 650 SUPPOSITORY RECTAL at 20:11

## 2025-01-14 RX ADMIN — LABETALOL HYDROCHLORIDE 5 MG: 5 INJECTION, SOLUTION INTRAVENOUS at 06:36

## 2025-01-14 RX ADMIN — SODIUM CHLORIDE: 9 INJECTION, SOLUTION INTRAVENOUS at 22:41

## 2025-01-14 RX ADMIN — ENOXAPARIN SODIUM 40 MG: 100 INJECTION SUBCUTANEOUS at 10:29

## 2025-01-14 RX ADMIN — SODIUM CHLORIDE, PRESERVATIVE FREE 10 ML: 5 INJECTION INTRAVENOUS at 10:34

## 2025-01-14 RX ADMIN — SODIUM CHLORIDE, PRESERVATIVE FREE 10 ML: 5 INJECTION INTRAVENOUS at 22:45

## 2025-01-14 RX ADMIN — SODIUM CHLORIDE 7.5 MG/HR: 9 INJECTION, SOLUTION INTRAVENOUS at 18:59

## 2025-01-14 RX ADMIN — SODIUM CHLORIDE: 9 INJECTION, SOLUTION INTRAVENOUS at 08:09

## 2025-01-14 RX ADMIN — VANCOMYCIN HYDROCHLORIDE 750 MG: 750 INJECTION, POWDER, LYOPHILIZED, FOR SOLUTION INTRAVENOUS at 22:52

## 2025-01-14 RX ADMIN — PIPERACILLIN AND TAZOBACTAM 4500 MG: 4; .5 INJECTION, POWDER, LYOPHILIZED, FOR SOLUTION INTRAVENOUS; PARENTERAL at 08:10

## 2025-01-14 RX ADMIN — SODIUM CHLORIDE 6.2 UNITS/HR: 9 INJECTION, SOLUTION INTRAVENOUS at 23:11

## 2025-01-14 RX ADMIN — METOPROLOL TARTRATE 5 MG: 5 INJECTION INTRAVENOUS at 10:30

## 2025-01-14 RX ADMIN — INSULIN HUMAN 9 UNITS: 100 INJECTION, SOLUTION PARENTERAL at 23:20

## 2025-01-14 NOTE — SIGNIFICANT EVENT
Nocturnist/cross cover provider called to room at approximately 1903 as RRT was called for  stroke like symptoms, reported change in mentation, confusion new onset, and not following directions, with reported transient slurred speech.     Patient Vitals for the past 8 hrs:   BP Temp Temp src Pulse Resp SpO2 Height Weight   01/13/25 1426 (!) 191/72 98 °F (36.7 °C) Oral 61 17 97 % -- --   01/13/25 1400 (!) 191/72 98 °F (36.7 °C) Oral 63 -- 97 % 1.575 m (5' 2\") 73.5 kg (162 lb)     No intake or output data in the 24 hours ending 01/13/25 1916      BACKGROUND/ SITUATION:  Attending provider following patient: Dr Meek- Cardiologist. Hospitalist team presently not following this patient, Hospitalist came to RRT per hospital protocol as part of the Rapid Response Team overnight.    84 y.o. female h/o  has a past medical history of Basal cell cancer (2012), CAD (coronary artery disease), Cancer (HCC) (1996), Diabetes (HCC), Diverticulosis, Hypercholesterolemia, Hypertension, Ill-defined condition, Nausea & vomiting, Obesity, and Sleep apnea (1997).   admitted 1/13/2025 for Nonspecific abnormal function study, cardiovascular [R94.30].  See prior cardiology attending/procedure notes for complete details of course of treatment.     FINDINGS:  Agitation, not following directions, not complaining of pain, repeatedly attempting to get out of the bed, no answering questions, appears to be moving all 4 extremities, attempting to repeatedly remove her right wrist cath s/p procedure device, attempting to repeatedly get oob.  and daughter at the bedside- states pt was normal but unaware of her procedure earlier, also d/w primary daytime nurse at bedside states pt was a+ox4, developed some slurred speech, attempting to get oob, not redirectable or following directions. BP reading 180s/110s upon initial bedside, but unable to get accurate reading at present due to pt moving about so much in the bed and repeatedly

## 2025-01-14 NOTE — CARDIO/PULMONARY
Chart reviewed: Patient is 84 y.o. female admitted with Nonspecific abnormal function study, cardiovascular [R94.30]    Cardiac cath 1/13/25 without intervention:  \"Mild-to-moderate disease in the mid LAD as described above. Overall, no significant obstructive disease.\"

## 2025-01-15 ENCOUNTER — APPOINTMENT (OUTPATIENT)
Facility: HOSPITAL | Age: 85
DRG: 871 | End: 2025-01-15
Attending: STUDENT IN AN ORGANIZED HEALTH CARE EDUCATION/TRAINING PROGRAM
Payer: MEDICARE

## 2025-01-15 PROBLEM — G93.40 ACUTE ENCEPHALOPATHY: Status: ACTIVE | Noted: 2025-01-15

## 2025-01-15 PROBLEM — R94.30 NONSPECIFIC ABNORMAL FUNCTION STUDY, CARDIOVASCULAR: Status: ACTIVE | Noted: 2025-01-15

## 2025-01-15 PROBLEM — R50.9 FEVER: Status: ACTIVE | Noted: 2025-01-15

## 2025-01-15 PROBLEM — G93.41 METABOLIC ENCEPHALOPATHY: Status: ACTIVE | Noted: 2025-01-15

## 2025-01-15 PROBLEM — R56.9 SEIZURE-LIKE ACTIVITY (HCC): Status: ACTIVE | Noted: 2025-01-15

## 2025-01-15 PROBLEM — D72.829 LEUKOCYTOSIS: Status: ACTIVE | Noted: 2025-01-15

## 2025-01-15 PROBLEM — R40.4 UNRESPONSIVE EPISODE: Status: ACTIVE | Noted: 2025-01-15

## 2025-01-15 LAB
ANION GAP SERPL CALC-SCNC: 7 MMOL/L (ref 2–12)
APPEARANCE CSF: ABNORMAL
ARTERIAL PATENCY WRIST A: YES
BASE DEFICIT BLDA-SCNC: 2.2 MMOL/L
BASOPHILS # BLD: 0.03 K/UL (ref 0–0.1)
BASOPHILS NFR BLD: 0.2 % (ref 0–1)
BDY SITE: NORMAL
BUN SERPL-MCNC: 31 MG/DL (ref 6–20)
BUN/CREAT SERPL: 31 (ref 12–20)
C GATTII+NEOFOR DNA CSF QL NAA+NON-PROBE: NOT DETECTED
CALCIUM SERPL-MCNC: 9.2 MG/DL (ref 8.5–10.1)
CHLORIDE SERPL-SCNC: 113 MMOL/L (ref 97–108)
CMV DNA CSF QL NAA+NON-PROBE: NOT DETECTED
CO2 SERPL-SCNC: 23 MMOL/L (ref 21–32)
COLOR CSF: ABNORMAL
COLOR SPUN CSF: ABNORMAL
CREAT SERPL-MCNC: 1 MG/DL (ref 0.55–1.02)
DIFFERENTIAL METHOD BLD: ABNORMAL
E COLI K1 DNA CSF QL NAA+NON-PROBE: NOT DETECTED
ECHO BSA: 1.79 M2
ECHO LV EF PHYSICIAN: 65 %
ECHO LV FRACTIONAL SHORTENING: 43 % (ref 28–44)
ECHO LV INTERNAL DIMENSION DIASTOLE INDEX: 2.11 CM/M2
ECHO LV INTERNAL DIMENSION DIASTOLIC: 3.7 CM (ref 3.9–5.3)
ECHO LV INTERNAL DIMENSION SYSTOLIC INDEX: 1.2 CM/M2
ECHO LV INTERNAL DIMENSION SYSTOLIC: 2.1 CM
ECHO LV IVSD: 1.1 CM (ref 0.6–0.9)
ECHO LV MASS 2D: 129.3 G (ref 67–162)
ECHO LV MASS INDEX 2D: 73.9 G/M2 (ref 43–95)
ECHO LV POSTERIOR WALL DIASTOLIC: 1.1 CM (ref 0.6–0.9)
ECHO LV RELATIVE WALL THICKNESS RATIO: 0.59
ECHO LVOT AREA: 2.8 CM2
ECHO LVOT DIAM: 1.9 CM
EKG ATRIAL RATE: 105 BPM
EKG DIAGNOSIS: NORMAL
EKG P AXIS: 38 DEGREES
EKG P-R INTERVAL: 194 MS
EKG Q-T INTERVAL: 368 MS
EKG QRS DURATION: 92 MS
EKG QTC CALCULATION (BAZETT): 486 MS
EKG R AXIS: 61 DEGREES
EKG T AXIS: 7 DEGREES
EKG VENTRICULAR RATE: 105 BPM
EOSINOPHIL # BLD: 0 K/UL (ref 0–0.4)
EOSINOPHIL NFR BLD: 0 % (ref 0–7)
ERYTHROCYTE [DISTWIDTH] IN BLOOD BY AUTOMATED COUNT: 13.2 % (ref 11.5–14.5)
EST. AVERAGE GLUCOSE BLD GHB EST-MCNC: 160 MG/DL
EST. AVERAGE GLUCOSE BLD GHB EST-MCNC: 163 MG/DL
EV RNA CSF QL NAA+NON-PROBE: NOT DETECTED
GAS FLOW.O2 O2 DELIVERY SYS: 2 L/MIN
GLUCOSE BLD STRIP.AUTO-MCNC: 101 MG/DL (ref 65–117)
GLUCOSE BLD STRIP.AUTO-MCNC: 117 MG/DL (ref 65–117)
GLUCOSE BLD STRIP.AUTO-MCNC: 120 MG/DL (ref 65–117)
GLUCOSE BLD STRIP.AUTO-MCNC: 122 MG/DL (ref 65–117)
GLUCOSE BLD STRIP.AUTO-MCNC: 146 MG/DL (ref 65–117)
GLUCOSE BLD STRIP.AUTO-MCNC: 147 MG/DL (ref 65–117)
GLUCOSE BLD STRIP.AUTO-MCNC: 158 MG/DL (ref 65–117)
GLUCOSE BLD STRIP.AUTO-MCNC: 159 MG/DL (ref 65–117)
GLUCOSE BLD STRIP.AUTO-MCNC: 167 MG/DL (ref 65–117)
GLUCOSE BLD STRIP.AUTO-MCNC: 183 MG/DL (ref 65–117)
GLUCOSE BLD STRIP.AUTO-MCNC: 212 MG/DL (ref 65–117)
GLUCOSE BLD STRIP.AUTO-MCNC: 214 MG/DL (ref 65–117)
GLUCOSE BLD STRIP.AUTO-MCNC: 219 MG/DL (ref 65–117)
GLUCOSE BLD STRIP.AUTO-MCNC: 227 MG/DL (ref 65–117)
GLUCOSE BLD STRIP.AUTO-MCNC: 227 MG/DL (ref 65–117)
GLUCOSE BLD STRIP.AUTO-MCNC: 242 MG/DL (ref 65–117)
GLUCOSE BLD STRIP.AUTO-MCNC: 290 MG/DL (ref 65–117)
GLUCOSE BLD STRIP.AUTO-MCNC: 96 MG/DL (ref 65–117)
GLUCOSE CSF-MCNC: 105 MG/DL (ref 40–70)
GLUCOSE SERPL-MCNC: 225 MG/DL (ref 65–100)
GP B STREP DNA CSF QL NAA+NON-PROBE: NOT DETECTED
HAEM INFLU DNA CSF QL NAA+NON-PROBE: NOT DETECTED
HBA1C MFR BLD: 7.2 % (ref 4–5.6)
HBA1C MFR BLD: 7.3 % (ref 4–5.6)
HCO3 BLDA-SCNC: 22 MMOL/L (ref 22–26)
HCT VFR BLD AUTO: 41.2 % (ref 35–47)
HGB BLD-MCNC: 13.7 G/DL (ref 11.5–16)
HHV6 DNA CSF QL NAA+NON-PROBE: NOT DETECTED
HSV1 DNA CSF QL NAA+PROBE: NOT DETECTED
HSV2 DNA CSF QL NAA+NON-PROBE: NOT DETECTED
IMM GRANULOCYTES # BLD AUTO: 0.16 K/UL (ref 0–0.04)
IMM GRANULOCYTES NFR BLD AUTO: 1 % (ref 0–0.5)
L MONOCYTOG DNA CSF QL NAA+NON-PROBE: NOT DETECTED
LACTATE SERPL-SCNC: 1.8 MMOL/L (ref 0.4–2)
LYMPHOCYTES # BLD: 0.78 K/UL (ref 0.8–3.5)
LYMPHOCYTES NFR BLD: 5 % (ref 12–49)
LYMPHOCYTES NFR CSF MANUAL: 8 % (ref 28–96)
MAGNESIUM SERPL-MCNC: 1.9 MG/DL (ref 1.6–2.4)
MCH RBC QN AUTO: 29.9 PG (ref 26–34)
MCHC RBC AUTO-ENTMCNC: 33.3 G/DL (ref 30–36.5)
MCV RBC AUTO: 90 FL (ref 80–99)
MONOCYTES # BLD: 0.51 K/UL (ref 0–1)
MONOCYTES NFR BLD: 3.3 % (ref 5–13)
MONOCYTES NFR CSF MANUAL: 4 % (ref 16–56)
N MEN DNA CSF QL NAA+NON-PROBE: NOT DETECTED
NEUTROPHILS NFR CSF MANUAL: 88 % (ref 0–7)
NEUTS SEG # BLD: 14.12 K/UL (ref 1.8–8)
NEUTS SEG NFR BLD: 90.5 % (ref 32–75)
NRBC # BLD: 0 K/UL (ref 0–0.01)
NRBC BLD-RTO: 0 PER 100 WBC
PARECHOVIRUS A RNA CSF QL NAA+NON-PROBE: NOT DETECTED
PCO2 BLDA: 35 MMHG (ref 35–45)
PH BLDA: 7.41 (ref 7.35–7.45)
PHOSPHATE SERPL-MCNC: 1.2 MG/DL (ref 2.6–4.7)
PLATELET # BLD AUTO: 226 K/UL (ref 150–400)
PMV BLD AUTO: 10.3 FL (ref 8.9–12.9)
PO2 BLDA: 90 MMHG (ref 80–100)
POTASSIUM SERPL-SCNC: 3.3 MMOL/L (ref 3.5–5.1)
PROT CSF-MCNC: 79 MG/DL (ref 15–45)
PROT CSF-MCNC: 84 MG/DL (ref 15–45)
RBC # BLD AUTO: 4.58 M/UL (ref 3.8–5.2)
RBC # CSF: ABNORMAL /CU MM
RBC MORPH BLD: ABNORMAL
S PNEUM DNA CSF QL NAA+NON-PROBE: NOT DETECTED
SAO2 % BLD: 97 % (ref 92–97)
SAO2% DEVICE SAO2% SENSOR NAME: NORMAL
SERVICE CMNT-IMP: ABNORMAL
SERVICE CMNT-IMP: NORMAL
SODIUM SERPL-SCNC: 143 MMOL/L (ref 136–145)
SPECIMEN SITE: NORMAL
TSH SERPL DL<=0.05 MIU/L-ACNC: 0.83 UIU/ML (ref 0.45–4.5)
TUBE # CSF: ABNORMAL
VANCOMYCIN SERPL-MCNC: 18.1 UG/ML
VZV DNA CSF QL NAA+NON-PROBE: NOT DETECTED
WBC # BLD AUTO: 15.6 K/UL (ref 3.6–11)
WBC # CSF: 92 /CU MM (ref 0–5)

## 2025-01-15 PROCEDURE — 2500000003 HC RX 250 WO HCPCS: Performed by: NURSE PRACTITIONER

## 2025-01-15 PROCEDURE — 84157 ASSAY OF PROTEIN OTHER: CPT

## 2025-01-15 PROCEDURE — 83605 ASSAY OF LACTIC ACID: CPT

## 2025-01-15 PROCEDURE — 87205 SMEAR GRAM STAIN: CPT

## 2025-01-15 PROCEDURE — 87483 CNS DNA AMP PROBE TYPE 12-25: CPT

## 2025-01-15 PROCEDURE — 6360000002 HC RX W HCPCS: Performed by: NURSE PRACTITIONER

## 2025-01-15 PROCEDURE — 6370000000 HC RX 637 (ALT 250 FOR IP): Performed by: INTERNAL MEDICINE

## 2025-01-15 PROCEDURE — 36415 COLL VENOUS BLD VENIPUNCTURE: CPT

## 2025-01-15 PROCEDURE — 95706 EEG WO VID 2-12HR INTMT MNTR: CPT

## 2025-01-15 PROCEDURE — 86618 LYME DISEASE ANTIBODY: CPT

## 2025-01-15 PROCEDURE — 80202 ASSAY OF VANCOMYCIN: CPT

## 2025-01-15 PROCEDURE — 93325 DOPPLER ECHO COLOR FLOW MAPG: CPT

## 2025-01-15 PROCEDURE — 84100 ASSAY OF PHOSPHORUS: CPT

## 2025-01-15 PROCEDURE — 2500000003 HC RX 250 WO HCPCS: Performed by: INTERNAL MEDICINE

## 2025-01-15 PROCEDURE — 89050 BODY FLUID CELL COUNT: CPT

## 2025-01-15 PROCEDURE — 009U3ZZ DRAINAGE OF SPINAL CANAL, PERCUTANEOUS APPROACH: ICD-10-PCS | Performed by: HOSPITALIST

## 2025-01-15 PROCEDURE — 82945 GLUCOSE OTHER FLUID: CPT

## 2025-01-15 PROCEDURE — 2000000000 HC ICU R&B

## 2025-01-15 PROCEDURE — 83735 ASSAY OF MAGNESIUM: CPT

## 2025-01-15 PROCEDURE — 85025 COMPLETE CBC W/AUTO DIFF WBC: CPT

## 2025-01-15 PROCEDURE — 80048 BASIC METABOLIC PNL TOTAL CA: CPT

## 2025-01-15 PROCEDURE — 83036 HEMOGLOBIN GLYCOSYLATED A1C: CPT

## 2025-01-15 PROCEDURE — 6360000002 HC RX W HCPCS: Performed by: INTERNAL MEDICINE

## 2025-01-15 PROCEDURE — 2700000000 HC OXYGEN THERAPY PER DAY

## 2025-01-15 PROCEDURE — 51798 US URINE CAPACITY MEASURE: CPT

## 2025-01-15 PROCEDURE — 86788 WEST NILE VIRUS AB IGM: CPT

## 2025-01-15 PROCEDURE — 6360000002 HC RX W HCPCS: Performed by: HOSPITALIST

## 2025-01-15 PROCEDURE — 36600 WITHDRAWAL OF ARTERIAL BLOOD: CPT

## 2025-01-15 PROCEDURE — 95717 EEG PHYS/QHP 2-12 HR W/O VID: CPT | Performed by: PSYCHIATRY & NEUROLOGY

## 2025-01-15 PROCEDURE — 86592 SYPHILIS TEST NON-TREP QUAL: CPT

## 2025-01-15 PROCEDURE — 87070 CULTURE OTHR SPECIMN AEROBIC: CPT

## 2025-01-15 PROCEDURE — XX20X89 MONITORING OF BRAIN ELECTRICAL ACTIVITY, COMPUTER-AIDED DETECTION AND NOTIFICATION, NEW TECHNOLOGY GROUP 9: ICD-10-PCS | Performed by: PSYCHIATRY & NEUROLOGY

## 2025-01-15 PROCEDURE — 82803 BLOOD GASES ANY COMBINATION: CPT

## 2025-01-15 PROCEDURE — 2580000003 HC RX 258: Performed by: INTERNAL MEDICINE

## 2025-01-15 PROCEDURE — 82962 GLUCOSE BLOOD TEST: CPT

## 2025-01-15 PROCEDURE — 99223 1ST HOSP IP/OBS HIGH 75: CPT | Performed by: PSYCHIATRY & NEUROLOGY

## 2025-01-15 PROCEDURE — 6370000000 HC RX 637 (ALT 250 FOR IP): Performed by: NURSE PRACTITIONER

## 2025-01-15 PROCEDURE — 87255 GENET VIRUS ISOLATE HSV: CPT

## 2025-01-15 PROCEDURE — 99222 1ST HOSP IP/OBS MODERATE 55: CPT | Performed by: NURSE PRACTITIONER

## 2025-01-15 PROCEDURE — 2580000003 HC RX 258: Performed by: NURSE PRACTITIONER

## 2025-01-15 PROCEDURE — 86789 WEST NILE VIRUS ANTIBODY: CPT

## 2025-01-15 PROCEDURE — 51702 INSERT TEMP BLADDER CATH: CPT

## 2025-01-15 RX ORDER — POTASSIUM CHLORIDE 7.45 MG/ML
10 INJECTION INTRAVENOUS ONCE
Status: COMPLETED | OUTPATIENT
Start: 2025-01-15 | End: 2025-01-15

## 2025-01-15 RX ORDER — PROPOFOL 10 MG/ML
5-50 INJECTION, EMULSION INTRAVENOUS CONTINUOUS
Status: DISCONTINUED | OUTPATIENT
Start: 2025-01-15 | End: 2025-01-15

## 2025-01-15 RX ORDER — FENTANYL CITRATE 50 UG/ML
25 INJECTION, SOLUTION INTRAMUSCULAR; INTRAVENOUS
Status: COMPLETED | OUTPATIENT
Start: 2025-01-15 | End: 2025-01-15

## 2025-01-15 RX ORDER — FENTANYL CITRATE-0.9 % NACL/PF 20 MCG/2ML
25 SYRINGE (ML) INTRAVENOUS EVERY 30 MIN PRN
Status: DISCONTINUED | OUTPATIENT
Start: 2025-01-15 | End: 2025-01-15

## 2025-01-15 RX ORDER — FENTANYL CITRATE-0.9 % NACL/PF 10 MCG/ML
25-200 PLASTIC BAG, INJECTION (ML) INTRAVENOUS CONTINUOUS
Status: DISCONTINUED | OUTPATIENT
Start: 2025-01-15 | End: 2025-01-15

## 2025-01-15 RX ORDER — NOREPINEPHRINE BITARTRATE 0.06 MG/ML
.5-2 INJECTION, SOLUTION INTRAVENOUS CONTINUOUS
Status: DISCONTINUED | OUTPATIENT
Start: 2025-01-15 | End: 2025-01-15

## 2025-01-15 RX ADMIN — SODIUM CHLORIDE, PRESERVATIVE FREE 10 ML: 5 INJECTION INTRAVENOUS at 21:06

## 2025-01-15 RX ADMIN — DEXAMETHASONE SODIUM PHOSPHATE 10 MG: 10 INJECTION, SOLUTION INTRAMUSCULAR; INTRAVENOUS at 04:23

## 2025-01-15 RX ADMIN — AMPICILLIN SODIUM 2000 MG: 2 INJECTION, POWDER, FOR SOLUTION INTRAMUSCULAR; INTRAVENOUS at 16:56

## 2025-01-15 RX ADMIN — SODIUM CHLORIDE, POTASSIUM CHLORIDE, SODIUM LACTATE AND CALCIUM CHLORIDE: 600; 310; 30; 20 INJECTION, SOLUTION INTRAVENOUS at 21:12

## 2025-01-15 RX ADMIN — WATER 2000 MG: 1 INJECTION INTRAMUSCULAR; INTRAVENOUS; SUBCUTANEOUS at 23:02

## 2025-01-15 RX ADMIN — FENTANYL CITRATE 25 MCG: 50 INJECTION INTRAMUSCULAR; INTRAVENOUS at 13:57

## 2025-01-15 RX ADMIN — AMPICILLIN SODIUM 2000 MG: 2 INJECTION, POWDER, FOR SOLUTION INTRAMUSCULAR; INTRAVENOUS at 11:41

## 2025-01-15 RX ADMIN — POTASSIUM CHLORIDE 10 MEQ: 7.46 INJECTION, SOLUTION INTRAVENOUS at 00:02

## 2025-01-15 RX ADMIN — Medication 1 AMPULE: at 21:06

## 2025-01-15 RX ADMIN — SODIUM CHLORIDE, POTASSIUM CHLORIDE, SODIUM LACTATE AND CALCIUM CHLORIDE: 600; 310; 30; 20 INJECTION, SOLUTION INTRAVENOUS at 10:07

## 2025-01-15 RX ADMIN — LABETALOL HYDROCHLORIDE 5 MG: 5 INJECTION, SOLUTION INTRAVENOUS at 20:06

## 2025-01-15 RX ADMIN — POTASSIUM CHLORIDE 10 MEQ: 7.46 INJECTION, SOLUTION INTRAVENOUS at 01:01

## 2025-01-15 RX ADMIN — POTASSIUM CHLORIDE 10 MEQ: 7.46 INJECTION, SOLUTION INTRAVENOUS at 06:00

## 2025-01-15 RX ADMIN — DEXAMETHASONE SODIUM PHOSPHATE 10 MG: 10 INJECTION, SOLUTION INTRAMUSCULAR; INTRAVENOUS at 23:02

## 2025-01-15 RX ADMIN — POTASSIUM PHOSPHATE, MONOBASIC POTASSIUM PHOSPHATE, DIBASIC 30 MMOL: 224; 236 INJECTION, SOLUTION, CONCENTRATE INTRAVENOUS at 10:16

## 2025-01-15 RX ADMIN — WATER 2000 MG: 1 INJECTION INTRAMUSCULAR; INTRAVENOUS; SUBCUTANEOUS at 11:34

## 2025-01-15 RX ADMIN — AMPICILLIN SODIUM 2000 MG: 2 INJECTION, POWDER, FOR SOLUTION INTRAMUSCULAR; INTRAVENOUS at 04:21

## 2025-01-15 RX ADMIN — SODIUM CHLORIDE 3.8 UNITS/HR: 9 INJECTION, SOLUTION INTRAVENOUS at 10:06

## 2025-01-15 RX ADMIN — ENOXAPARIN SODIUM 40 MG: 100 INJECTION SUBCUTANEOUS at 08:36

## 2025-01-15 RX ADMIN — SODIUM CHLORIDE, PRESERVATIVE FREE 10 ML: 5 INJECTION INTRAVENOUS at 08:38

## 2025-01-15 RX ADMIN — AMPICILLIN SODIUM 2000 MG: 2 INJECTION, POWDER, FOR SOLUTION INTRAMUSCULAR; INTRAVENOUS at 23:07

## 2025-01-15 RX ADMIN — VANCOMYCIN HYDROCHLORIDE 750 MG: 750 INJECTION, POWDER, LYOPHILIZED, FOR SOLUTION INTRAVENOUS at 17:02

## 2025-01-15 RX ADMIN — ACYCLOVIR SODIUM 750 MG: 50 INJECTION, SOLUTION INTRAVENOUS at 00:21

## 2025-01-15 RX ADMIN — DEXAMETHASONE SODIUM PHOSPHATE 10 MG: 10 INJECTION, SOLUTION INTRAMUSCULAR; INTRAVENOUS at 16:57

## 2025-01-15 RX ADMIN — ACYCLOVIR SODIUM 750 MG: 50 INJECTION, SOLUTION INTRAVENOUS at 23:39

## 2025-01-15 RX ADMIN — ACYCLOVIR SODIUM 750 MG: 50 INJECTION, SOLUTION INTRAVENOUS at 11:35

## 2025-01-15 RX ADMIN — Medication 1 AMPULE: at 08:37

## 2025-01-15 RX ADMIN — DEXAMETHASONE SODIUM PHOSPHATE 10 MG: 10 INJECTION, SOLUTION INTRAMUSCULAR; INTRAVENOUS at 11:25

## 2025-01-15 ASSESSMENT — PAIN SCALES - GENERAL: PAINLEVEL_OUTOF10: 0

## 2025-01-15 NOTE — SIGNIFICANT EVENT
RAPID RESPONSE TEAM NOTE:    2120: While rounding, noted patient's GCS to be charted as 6. Patient was admitted for elective left heart cath on 1/13 which found 40-50% LAD stenosis, no interventions performed. Patient developed significant AMS overnight on 1/13 and a code stroke was called. CT Head imaging showed stable mass lesion anterior to the left cerebellum compared with previous Head CT. Over the course of the day, patient has become increasingly obtunded.    Assessment:  Upon arrival to bedside, patient is obtunded, does not open eyes to any stimuli. Patient does withdraw extremities from painful stimuli. Patient has extremely weak cough/gag when stimulated with suctioning. Pupils equal and reactive. GCS: 7    Vital signs as follows: Temp: 98.7, HR: 120, BP: 128/65 (84), RR: 32, SpO2: 95% on 2L NC    Interventions:  Called Shafer, NP to bedside, received orders for:    - CBC, BMP, and Blood Culture  - VBG  - NS infusion  - Ceribell  - ICU consult    ICU NP ROSANNE Bermudez at bedside, received orders to transfer patient to ICU.    Outcome:  Patient transferred to CCU room 2510 for further care. Family at bedside.    Please call with any questions or concerns  Tanya Maxwell RN  RRT [phone]

## 2025-01-15 NOTE — INTERDISCIPLINARY ROUNDS
Critical care interdisciplinary rounds today.  Following members present: Case Management, , Clinical Lead, Diabetes Team, Nursing, Nutrition, Pharmacy, and Physician. Family invited to participate.  Plan of care discussed.  See clinical pathway for plan of care and interventions and desired outcomes.    LP planned for today.

## 2025-01-15 NOTE — CONSULTS
Hospitalist Progress Note    NAME:   Katey Sterling   : 1940   MRN: [id number]     Date/Time: 2025 1:26 PM  Patient PCP: Catalino Gaines MD    Interim history: Patient was not arousable on my evaluation.  On examination, there is concern that she might have lower abdominal pain.  Unable to get rest of review of system.  Plan of care communicated to the family at bedside in detail and all questions were answered    Assessment / Plan:    Severe sepsis , of unknown source of region  Leukocytosis  Fever  Tachycardia  Acute hypoxic respiratory failure of unknown cause  Acute metabolic encephalopathy with agitation and behaviors  Chronic obstructive sleep apnea, uses CPAP at home    Temperature 103.1  Heart rate in 30s  Blood pressure around 110  On new oxygen room on 2 L nasal cannula  WBC 14.9 K  Pro-Luiz 0.16  Lactic acid 1.8    Exam with abdominal pain:   CT head: 26 x 21 x 16 mm mass lesion anterior to the cerebellum on the left with  effacement of the left cerebellum but no associated vasogenic edema is  consistent with a meningioma  Chest x-ray: Normal  Urinalysis without infection      Plan :     Ordered CT abdominal pelvis, CT chest for further evaluation of infection  MRI brain pending  Follow-up blood cultures  Continue vancomycin and Zosyn  Ordered MRSA, full respiratory virus panel, Legionella  Saturation above 92%  Continue on nasal cannula  General Surgery is on board  Status post cardiac cath on   Cardiology on the board  Cephalopathy postprocedural, component of anesthesia could also be playing a role along with sepsis  Pending neurology evaluation  Will order the ABG  ID consult        Uncontrolled hypertension  Blood pressure ranging between 200 systolic to 190.  Patient confused and is not communicating  Discontinued metoprolol 5 IV every 6 hours  Started on nicardipine drip  Discussed with pharmacy      Type 2 diabetes  Uncontrolled hyperglycemia    Started on glargine 10 
     CRITICAL CARE NOTE      Name: Katey Sterling   : 1940   MRN: [id number]   Date: 2025      REASON FOR ICU ADMISSION: Toxic metabolic encephalopathy    PRINCIPAL ICU DIAGNOSIS   Toxic metabolic encephalopathy  Severe sepsis of unclear etiology  S/p LHC  Hyperglycemia  Acute kidney injury, hypokalemia    BRIEF PATIENT SUMMARY   85 y/o female PMHx of HTN, HLD, T2DM, THANG on CPAP, remote hx of hx of basal cell cancer, breast cancer (), and recent dx of meningioma in the cerebellum region  (2024, being evaluated by Dr. Perez for possible resection), admitted () for scheduled LHC for pre- op clearance for possible meningioma resection.  LHC demonstrated 40 to 50% LAD stenosis, no intervention.  Initially postprocedure Awake/oriented, with subsequent acute onset of confusion, agitation and slurred speech.  CTH demonstrated known mass lesion consistent with meningioma, no vasogenic edema. Overnight worsening mentation, + Febrile (103), started on broad spectrum antibiotics.  MRI brain without-no significant change in left cerebellar pontine angle/retrosigmoid meningioma.  CTA chest-negative.CT A/P-no acute findings, pancreatic head region cyst/cystic mass,colonic diverticulosis, moderate rectal stool distention. Evening 18/14. + obtunded, VBG Ph 7.39/31.8 ICU consulted d/t concern for airway protection    Initial ICU evaluation: obtunded, trace withdrawal to deep tactile/noxious stimuli, roving eye movement.   Start empiric antibiotics/antiviral coverage for meningitis, obtain LP in a.m.  Rapid EEG-no evidence of seizure thus far  Would benefit from MRI brain with and without (prior was performed without)  Optimize electrolytes and hemodynamics, Currently protecting airway, high risk for requiring intubation have discussed CODE STATUS with  who advises full code.    COMPREHENSIVE ASSESSMENT & PLAN:SYSTEM BASED     Acute encephalopathy of unclear etiology: Toxic/metabolic vs  seizure vs 
    Hospitalist Consultation Note    NAME:  Katey Sterling   :   1940   MRN:   [id number]     ATTENDING: Bib Trammell MD  PCP:  Catalino Gaines MD    Date/Time:  2025 8:37 PM      Recommendations/Plan:       ASSESSMENT:    Acute metabolic encephalopathy with agitation and behaviors- teleneuro and ct head w/u done w/o acute cva findings or recommendations per teleneuro.   Chronic T2DM controlled with diet and oral/injectable weekly med at home  3. Mild Acute on chronic Htn now 130s/100s, asymptomatic, sleeping  4. Chronic Hld-   5. Chronic Alex uses cpap chronically at home  6. Brain tumor getting w/u with cath etc for surgery for debulking- dx about 3-4 mos ago per - initial sx were visual deficits one more than other but reported both per daughter, states also vertigo started. Right eye worse visual per . No h/o dementia/alzheimers.  6. H/o basal cell cancer  7. H/o CAD s/p cardiac cath with dr trammell today 25  8. H/o breast cancer     RECOMMENDATIONS:    abg neg acute findings, cxr results pending. Ativan 0.5mg iv x1 was given, 2nd dose not given, remaining lab w/u pending.  EKG results pending.  Am labs cbc, cmp, pt, A1c, flp. U/a w/ reflex pending, nurse may do via straight cath if needed. Consider additional meds for pt safety overnight if needed for behaviors, 2206 pt having behaviors becoming combative per nurse, asked nurse gave the ativan 2nd dose earlier as wasn't given yet, ordered sitter for pt safety per nurse request.  2. Chronic ozempic weekly, metformin 1000mg bid- on hold as had cardiac cath per cards- will add hypoglycemia protocol and achs accuchecks- consider to add ssi for supportive care while in the hospital- defer for now as d/w the nurse.   3. cozaar new per cards to start 100mg tonight- not yet received- prn labetalol w/parameters ordered per d/w nurse as concerned pt may not be able to swallow pills safely tonight  4. Chronic atrovastatin 
Infectious Disease Consult    Today's Date: 1/15/2025   Admit Date: 1/13/2025    Date of Consultation:  January 15, 2025  Reason for consult:FUO  Referring Physician: MD Susu    HPI:  Patient is a 84 y.o. female with medical history of hypertension, hyperlipidemia, type II DM, DJD with prior bilateral knee replacements, THANG (on CPAP at night), and meningioma who underwent for LHC on 1/13 who developed AMS with behavior changes post procedure. Cardiac cath revealed EF 40-50% with mid LAD stenosis    Post cardiac cath, t-max 103, wbc wbc 15.6 with 90.5% neutrophils, tachycardia, tachypnea, and hypertensive. Pt was admitted to ICU. CTA chest revealed no PE, no acute findings. CT of abd/pel Pancreatic head region cyst/cystic mass. Pt was started on empiric IV acyclovir, ampicillin, rocephin, and vancomycin with concerns of CNS infection. Pt was evaluated by neurology team.    Pt was recently diagnosed with cerebellar meningioma, was following up with Dr. Moya who recommend non urgent surgical intervention.    Pt did not participate with ROS questionnaires and exam. Pt's family at bedside.     ID team was consulted for evaluation and treatment recommendations regarding FUO.     Impression:   Sepsis; fever, leukocytosis, tachycardia  FUO  Leukocytosis; currently on steroid (dexamethasone 10 mg q 6 hour)  Acute metabolic encephalopathy  - T-max 103.1, wbc 15.6 with 90.5% neutrophils    Blood cx (1/14) no growth so far    U/A (1/14) wbc 0-4    COVID/influenza (-)    CRP <0.29, sed rate 14, procal 0.26    Cerebellar meningioma    - was evaluated by neurosurgery team, planning for non urgent surgical intervention    Hypertension  Type II DM (A1C 7.3)    Plan:     - continue with IV acyclovir, ampicillin, rocephin, and vancomycin   Pharmacy to dose per creatinine clearance.    Planning for LP per intensive team; will follow lab work (cell count, protein, glucose, cx, meningitis pathogen panel) upon completion of 
(OMNIPEN) 2,000 mg in sodium chloride 0.9 % 100 mL IVPB (mini-bag)  2,000 mg IntraVENous Q6H    dexAMETHasone (PF) (DECADRON) injection 10 mg  10 mg IntraVENous Q6H    acyclovir (ZOVIRAX) 750 mg in sodium chloride 0.9 % 250 mL IVPB  10 mg/kg IntraVENous Q12H    lactated ringers infusion   IntraVENous Continuous    alcohol 62% (NOZIN) nasal  1 ampule  1 ampule Nasal BID    atorvastatin (LIPITOR) tablet 20 mg  20 mg Oral Daily    losartan (COZAAR) tablet 100 mg  100 mg Oral Daily    [Held by provider] meclizine (ANTIVERT) tablet 25 mg  25 mg Oral TID PRN    nitroGLYCERIN (NITROSTAT) SL tablet 0.4 mg  0.4 mg SubLINGual Q5 Min PRN    sodium chloride flush 0.9 % injection 5-40 mL  5-40 mL IntraVENous 2 times per day    sodium chloride flush 0.9 % injection 5-40 mL  5-40 mL IntraVENous PRN    0.9 % sodium chloride infusion   IntraVENous PRN    aspirin EC tablet 81 mg  81 mg Oral Daily    enoxaparin (LOVENOX) injection 40 mg  40 mg SubCUTAneous Daily    labetalol (NORMODYNE;TRANDATE) injection 5 mg  5 mg IntraVENous Q6H PRN    ondansetron (ZOFRAN) injection 4 mg  4 mg IntraVENous Q6H PRN         Review of Systems:    [x]Unable to obtain  ROS due to  [x]mental status change  []sedated   []intubated    Objective:     BP (!) 160/94   Pulse (!) 105   Temp (!) 100.7 °F (38.2 °C) (Axillary)   Resp 24   Ht 1.575 m (5' 2\")   Wt 73.5 kg (162 lb)   SpO2 96%   BMI 29.63 kg/m²     Physical Exam:    General:  appears well nourished in no acute distress  Neck: no carotid bruits  Lungs: clear to auscultation  Heart:  no murmurs, regular rate  Lower extremity: peripheral pulses palpable and no edema  Skin: intact    Neurological exam:    The patient is arousable.  She did tell me her name.  Her speech was soft and slightly garbled but was comprehensible.  She was able to follow simple one-step commands such as stick out her tongue and squeeze my hands.  She does also close her eyes to commands    Cranial nerves:   II-XII

## 2025-01-16 PROBLEM — E11.65 TYPE 2 DIABETES MELLITUS WITH HYPERGLYCEMIA, WITHOUT LONG-TERM CURRENT USE OF INSULIN (HCC): Status: ACTIVE | Noted: 2025-01-16

## 2025-01-16 PROBLEM — G96.89 CNS INFECTION: Status: ACTIVE | Noted: 2025-01-16

## 2025-01-16 PROBLEM — B99.9 CNS INFECTION: Status: ACTIVE | Noted: 2025-01-16

## 2025-01-16 LAB
ANION GAP SERPL CALC-SCNC: 4 MMOL/L (ref 2–12)
BASOPHILS # BLD: 0.02 K/UL (ref 0–0.1)
BASOPHILS NFR BLD: 0.1 % (ref 0–1)
BUN SERPL-MCNC: 29 MG/DL (ref 6–20)
BUN/CREAT SERPL: 53 (ref 12–20)
CALCIUM SERPL-MCNC: 9.5 MG/DL (ref 8.5–10.1)
CHLORIDE SERPL-SCNC: 120 MMOL/L (ref 97–108)
CO2 SERPL-SCNC: 25 MMOL/L (ref 21–32)
CREAT SERPL-MCNC: 0.55 MG/DL (ref 0.55–1.02)
DIFFERENTIAL METHOD BLD: ABNORMAL
EOSINOPHIL # BLD: 0 K/UL (ref 0–0.4)
EOSINOPHIL NFR BLD: 0 % (ref 0–7)
ERYTHROCYTE [DISTWIDTH] IN BLOOD BY AUTOMATED COUNT: 13.5 % (ref 11.5–14.5)
GLUCOSE BLD STRIP.AUTO-MCNC: 104 MG/DL (ref 65–117)
GLUCOSE BLD STRIP.AUTO-MCNC: 129 MG/DL (ref 65–117)
GLUCOSE BLD STRIP.AUTO-MCNC: 129 MG/DL (ref 65–117)
GLUCOSE BLD STRIP.AUTO-MCNC: 139 MG/DL (ref 65–117)
GLUCOSE BLD STRIP.AUTO-MCNC: 143 MG/DL (ref 65–117)
GLUCOSE BLD STRIP.AUTO-MCNC: 164 MG/DL (ref 65–117)
GLUCOSE BLD STRIP.AUTO-MCNC: 165 MG/DL (ref 65–117)
GLUCOSE BLD STRIP.AUTO-MCNC: 170 MG/DL (ref 65–117)
GLUCOSE BLD STRIP.AUTO-MCNC: 175 MG/DL (ref 65–117)
GLUCOSE BLD STRIP.AUTO-MCNC: 190 MG/DL (ref 65–117)
GLUCOSE BLD STRIP.AUTO-MCNC: 196 MG/DL (ref 65–117)
GLUCOSE BLD STRIP.AUTO-MCNC: 203 MG/DL (ref 65–117)
GLUCOSE BLD STRIP.AUTO-MCNC: 210 MG/DL (ref 65–117)
GLUCOSE BLD STRIP.AUTO-MCNC: 243 MG/DL (ref 65–117)
GLUCOSE BLD STRIP.AUTO-MCNC: 96 MG/DL (ref 65–117)
GLUCOSE BLD STRIP.AUTO-MCNC: 99 MG/DL (ref 65–117)
GLUCOSE SERPL-MCNC: 152 MG/DL (ref 65–100)
HCT VFR BLD AUTO: 35.7 % (ref 35–47)
HGB BLD-MCNC: 11.8 G/DL (ref 11.5–16)
IMM GRANULOCYTES # BLD AUTO: 0.18 K/UL (ref 0–0.04)
IMM GRANULOCYTES NFR BLD AUTO: 0.9 % (ref 0–0.5)
LYME ANTIBODY: NEGATIVE
LYMPHOCYTES # BLD: 0.73 K/UL (ref 0.8–3.5)
LYMPHOCYTES NFR BLD: 3.6 % (ref 12–49)
MAGNESIUM SERPL-MCNC: 1.8 MG/DL (ref 1.6–2.4)
MCH RBC QN AUTO: 29.6 PG (ref 26–34)
MCHC RBC AUTO-ENTMCNC: 33.1 G/DL (ref 30–36.5)
MCV RBC AUTO: 89.7 FL (ref 80–99)
MONOCYTES # BLD: 1.04 K/UL (ref 0–1)
MONOCYTES NFR BLD: 5.1 % (ref 5–13)
NEUTS SEG # BLD: 18.33 K/UL (ref 1.8–8)
NEUTS SEG NFR BLD: 90.3 % (ref 32–75)
NRBC # BLD: 0 K/UL (ref 0–0.01)
NRBC BLD-RTO: 0 PER 100 WBC
PHOSPHATE SERPL-MCNC: 3.2 MG/DL (ref 2.6–4.7)
PLATELET # BLD AUTO: 189 K/UL (ref 150–400)
PMV BLD AUTO: 10.2 FL (ref 8.9–12.9)
POTASSIUM SERPL-SCNC: 3.6 MMOL/L (ref 3.5–5.1)
RBC # BLD AUTO: 3.98 M/UL (ref 3.8–5.2)
RBC MORPH BLD: ABNORMAL
REAGIN AB CSF QL: NON REACTIVE
SERVICE CMNT-IMP: ABNORMAL
SERVICE CMNT-IMP: NORMAL
SODIUM SERPL-SCNC: 149 MMOL/L (ref 136–145)
WBC # BLD AUTO: 20.3 K/UL (ref 3.6–11)
WNV IGG SPEC QL IA: NEGATIVE
WNV IGM CSF QL IA: NEGATIVE

## 2025-01-16 PROCEDURE — 99232 SBSQ HOSP IP/OBS MODERATE 35: CPT | Performed by: NURSE PRACTITIONER

## 2025-01-16 PROCEDURE — 2500000003 HC RX 250 WO HCPCS: Performed by: HOSPITALIST

## 2025-01-16 PROCEDURE — 84100 ASSAY OF PHOSPHORUS: CPT

## 2025-01-16 PROCEDURE — 6360000002 HC RX W HCPCS: Performed by: NURSE PRACTITIONER

## 2025-01-16 PROCEDURE — 82962 GLUCOSE BLOOD TEST: CPT

## 2025-01-16 PROCEDURE — 6370000000 HC RX 637 (ALT 250 FOR IP)

## 2025-01-16 PROCEDURE — 2500000003 HC RX 250 WO HCPCS: Performed by: INTERNAL MEDICINE

## 2025-01-16 PROCEDURE — 99221 1ST HOSP IP/OBS SF/LOW 40: CPT

## 2025-01-16 PROCEDURE — 2500000003 HC RX 250 WO HCPCS: Performed by: NURSE PRACTITIONER

## 2025-01-16 PROCEDURE — 80048 BASIC METABOLIC PNL TOTAL CA: CPT

## 2025-01-16 PROCEDURE — 2580000003 HC RX 258: Performed by: HOSPITALIST

## 2025-01-16 PROCEDURE — 83735 ASSAY OF MAGNESIUM: CPT

## 2025-01-16 PROCEDURE — 99232 SBSQ HOSP IP/OBS MODERATE 35: CPT | Performed by: PSYCHIATRY & NEUROLOGY

## 2025-01-16 PROCEDURE — 2060000000 HC ICU INTERMEDIATE R&B

## 2025-01-16 PROCEDURE — 2700000000 HC OXYGEN THERAPY PER DAY

## 2025-01-16 PROCEDURE — 6370000000 HC RX 637 (ALT 250 FOR IP): Performed by: INTERNAL MEDICINE

## 2025-01-16 PROCEDURE — 6360000002 HC RX W HCPCS: Performed by: INTERNAL MEDICINE

## 2025-01-16 PROCEDURE — 6360000002 HC RX W HCPCS: Performed by: HOSPITALIST

## 2025-01-16 PROCEDURE — 6370000000 HC RX 637 (ALT 250 FOR IP): Performed by: NURSE PRACTITIONER

## 2025-01-16 PROCEDURE — 85025 COMPLETE CBC W/AUTO DIFF WBC: CPT

## 2025-01-16 PROCEDURE — 2580000003 HC RX 258: Performed by: INTERNAL MEDICINE

## 2025-01-16 PROCEDURE — 2580000003 HC RX 258: Performed by: NURSE PRACTITIONER

## 2025-01-16 PROCEDURE — 6370000000 HC RX 637 (ALT 250 FOR IP): Performed by: HOSPITALIST

## 2025-01-16 PROCEDURE — 36415 COLL VENOUS BLD VENIPUNCTURE: CPT

## 2025-01-16 RX ORDER — DEXAMETHASONE SODIUM PHOSPHATE 10 MG/ML
10 INJECTION, SOLUTION INTRAMUSCULAR; INTRAVENOUS EVERY 12 HOURS
Status: DISCONTINUED | OUTPATIENT
Start: 2025-01-16 | End: 2025-01-17

## 2025-01-16 RX ORDER — INSULIN GLARGINE 100 [IU]/ML
21 INJECTION, SOLUTION SUBCUTANEOUS ONCE
Status: COMPLETED | OUTPATIENT
Start: 2025-01-16 | End: 2025-01-16

## 2025-01-16 RX ORDER — LABETALOL HYDROCHLORIDE 5 MG/ML
10 INJECTION, SOLUTION INTRAVENOUS ONCE
Status: COMPLETED | OUTPATIENT
Start: 2025-01-16 | End: 2025-01-16

## 2025-01-16 RX ORDER — NIFEDIPINE 30 MG/1
60 TABLET, EXTENDED RELEASE ORAL DAILY
Status: DISCONTINUED | OUTPATIENT
Start: 2025-01-16 | End: 2025-01-23 | Stop reason: HOSPADM

## 2025-01-16 RX ORDER — INSULIN LISPRO 100 [IU]/ML
0-8 INJECTION, SOLUTION INTRAVENOUS; SUBCUTANEOUS
Status: DISCONTINUED | OUTPATIENT
Start: 2025-01-16 | End: 2025-01-23 | Stop reason: HOSPADM

## 2025-01-16 RX ADMIN — VANCOMYCIN HYDROCHLORIDE 1000 MG: 1 INJECTION, POWDER, LYOPHILIZED, FOR SOLUTION INTRAVENOUS at 20:30

## 2025-01-16 RX ADMIN — SODIUM CHLORIDE, POTASSIUM CHLORIDE, SODIUM LACTATE AND CALCIUM CHLORIDE: 600; 310; 30; 20 INJECTION, SOLUTION INTRAVENOUS at 08:38

## 2025-01-16 RX ADMIN — Medication 1 AMPULE: at 08:55

## 2025-01-16 RX ADMIN — DEXAMETHASONE SODIUM PHOSPHATE 10 MG: 10 INJECTION, SOLUTION INTRAMUSCULAR; INTRAVENOUS at 05:04

## 2025-01-16 RX ADMIN — LABETALOL HYDROCHLORIDE 5 MG: 5 INJECTION, SOLUTION INTRAVENOUS at 05:04

## 2025-01-16 RX ADMIN — ENOXAPARIN SODIUM 40 MG: 100 INJECTION SUBCUTANEOUS at 08:35

## 2025-01-16 RX ADMIN — SODIUM CHLORIDE, PRESERVATIVE FREE 10 ML: 5 INJECTION INTRAVENOUS at 08:35

## 2025-01-16 RX ADMIN — LABETALOL HYDROCHLORIDE 5 MG: 5 INJECTION, SOLUTION INTRAVENOUS at 11:07

## 2025-01-16 RX ADMIN — LOSARTAN POTASSIUM 100 MG: 100 TABLET, FILM COATED ORAL at 08:33

## 2025-01-16 RX ADMIN — INSULIN GLARGINE 21 UNITS: 100 INJECTION, SOLUTION SUBCUTANEOUS at 11:14

## 2025-01-16 RX ADMIN — LABETALOL HYDROCHLORIDE 10 MG: 5 INJECTION INTRAVENOUS at 21:23

## 2025-01-16 RX ADMIN — VANCOMYCIN HYDROCHLORIDE 750 MG: 750 INJECTION, POWDER, LYOPHILIZED, FOR SOLUTION INTRAVENOUS at 09:34

## 2025-01-16 RX ADMIN — Medication 1 AMPULE: at 20:19

## 2025-01-16 RX ADMIN — SODIUM CHLORIDE, PRESERVATIVE FREE 10 ML: 5 INJECTION INTRAVENOUS at 20:19

## 2025-01-16 RX ADMIN — ACYCLOVIR SODIUM 750 MG: 50 INJECTION, SOLUTION INTRAVENOUS at 13:02

## 2025-01-16 RX ADMIN — INSULIN LISPRO 2 UNITS: 100 INJECTION, SOLUTION INTRAVENOUS; SUBCUTANEOUS at 20:19

## 2025-01-16 RX ADMIN — ASPIRIN 81 MG: 81 TABLET, COATED ORAL at 08:33

## 2025-01-16 RX ADMIN — GUAIFENESIN 600 MG: 600 TABLET ORAL at 08:33

## 2025-01-16 RX ADMIN — WATER 2000 MG: 1 INJECTION INTRAMUSCULAR; INTRAVENOUS; SUBCUTANEOUS at 22:12

## 2025-01-16 RX ADMIN — ATORVASTATIN CALCIUM 20 MG: 20 TABLET, FILM COATED ORAL at 08:34

## 2025-01-16 RX ADMIN — GUAIFENESIN 600 MG: 600 TABLET ORAL at 20:19

## 2025-01-16 RX ADMIN — AMPICILLIN SODIUM 2000 MG: 2 INJECTION, POWDER, FOR SOLUTION INTRAMUSCULAR; INTRAVENOUS at 05:08

## 2025-01-16 RX ADMIN — WATER 2000 MG: 1 INJECTION INTRAMUSCULAR; INTRAVENOUS; SUBCUTANEOUS at 11:08

## 2025-01-16 RX ADMIN — NIFEDIPINE 60 MG: 30 TABLET, FILM COATED, EXTENDED RELEASE ORAL at 18:20

## 2025-01-16 RX ADMIN — INSULIN LISPRO 2 UNITS: 100 INJECTION, SOLUTION INTRAVENOUS; SUBCUTANEOUS at 18:20

## 2025-01-16 RX ADMIN — SODIUM CHLORIDE, PRESERVATIVE FREE 10 ML: 5 INJECTION INTRAVENOUS at 22:09

## 2025-01-16 RX ADMIN — VASOPRESSIN: 20 INJECTION, SOLUTION INTRAVENOUS at 20:45

## 2025-01-16 RX ADMIN — SODIUM CHLORIDE 0.7 UNITS/HR: 9 INJECTION, SOLUTION INTRAVENOUS at 13:06

## 2025-01-16 RX ADMIN — LABETALOL HYDROCHLORIDE 5 MG: 5 INJECTION, SOLUTION INTRAVENOUS at 17:32

## 2025-01-16 RX ADMIN — INSULIN HUMAN 18 UNITS: 100 INJECTION, SUSPENSION SUBCUTANEOUS at 14:17

## 2025-01-16 RX ADMIN — DEXAMETHASONE SODIUM PHOSPHATE 10 MG: 10 INJECTION, SOLUTION INTRAMUSCULAR; INTRAVENOUS at 14:15

## 2025-01-16 RX ADMIN — LABETALOL HYDROCHLORIDE 10 MG: 5 INJECTION INTRAVENOUS at 14:15

## 2025-01-16 RX ADMIN — VASOPRESSIN: 20 INJECTION, SOLUTION INTRAVENOUS at 12:58

## 2025-01-16 ASSESSMENT — PAIN SCALES - GENERAL
PAINLEVEL_OUTOF10: 0

## 2025-01-16 NOTE — CARE COORDINATION
Care Management Initial Assessment       RUR: 13%  Readmission? No  1st IM letter given? Yes - Given by patient access on 1/13/25  1st  letter given: No--N/A           01/16/25 1533   Service Assessment   Patient Orientation Alert and Oriented;Person;Place;Situation;Self   Cognition Alert   History Provided By Patient;Spouse   Primary Caregiver Self   Support Systems Spouse/Significant Other;Children   Patient's Healthcare Decision Maker is: Named in Scanned ACP Document   PCP Verified by CM Yes   Last Visit to PCP   (Patient saw her pcp in November 2024.)   Prior Functional Level Independent in ADLs/IADLs   Current Functional Level Assistance with the following:;Bathing;Dressing;Toileting;Mobility;Feeding   Can patient return to prior living arrangement Other (see comment)  ( wants to see how the patient does with therapy.)   Family able to assist with home care needs: Other (comment)  ( wants to see how the patient does with therapy.)   Would you like for me to discuss the discharge plan with any other family members/significant others, and if so, who? Yes  ()   Financial Resources Medicare  (She also has a medicare supplement.)   Community Resources None   Social/Functional History   Lives With Spouse   Type of Home House  (Two story home.)   Home Equipment Cane  (She uses cpap at night.)   Active  Yes   Discharge Planning   Type of Residence Other (Comment)  ( wants to see how the patient progresses.)   Current Services Prior To Admission C-pap  (Uses a cpap at night.)   Patient expects to be discharged to: Other (comment)  ( wants to see how the patient progresses.)           Patient has two adopted children--one daughter who lives in [place], [place] and a son who lives in [facility].  Discussed dcp with the patient and her spouse that she will more likely need inpatient rehab prior to going home.  She wants to see how she does with therapy. The

## 2025-01-17 LAB
ANION GAP SERPL CALC-SCNC: 8 MMOL/L (ref 2–12)
BASOPHILS # BLD: 0.03 K/UL (ref 0–0.1)
BASOPHILS NFR BLD: 0.1 % (ref 0–1)
BUN SERPL-MCNC: 23 MG/DL (ref 6–20)
BUN/CREAT SERPL: 40 (ref 12–20)
CALCIUM SERPL-MCNC: 9.2 MG/DL (ref 8.5–10.1)
CHLORIDE SERPL-SCNC: 108 MMOL/L (ref 97–108)
CO2 SERPL-SCNC: 24 MMOL/L (ref 21–32)
CREAT SERPL-MCNC: 0.58 MG/DL (ref 0.55–1.02)
DIFFERENTIAL METHOD BLD: ABNORMAL
EOSINOPHIL # BLD: 0 K/UL (ref 0–0.4)
EOSINOPHIL NFR BLD: 0 % (ref 0–7)
ERYTHROCYTE [DISTWIDTH] IN BLOOD BY AUTOMATED COUNT: 13.5 % (ref 11.5–14.5)
GLUCOSE BLD STRIP.AUTO-MCNC: 208 MG/DL (ref 65–117)
GLUCOSE BLD STRIP.AUTO-MCNC: 217 MG/DL (ref 65–117)
GLUCOSE BLD STRIP.AUTO-MCNC: 231 MG/DL (ref 65–117)
GLUCOSE BLD STRIP.AUTO-MCNC: 266 MG/DL (ref 65–117)
GLUCOSE BLD STRIP.AUTO-MCNC: 295 MG/DL (ref 65–117)
GLUCOSE SERPL-MCNC: 227 MG/DL (ref 65–100)
HCT VFR BLD AUTO: 37.3 % (ref 35–47)
HGB BLD-MCNC: 12.6 G/DL (ref 11.5–16)
HSV SPEC CULT: NEGATIVE
IMM GRANULOCYTES # BLD AUTO: 0.33 K/UL (ref 0–0.04)
IMM GRANULOCYTES NFR BLD AUTO: 1.6 % (ref 0–0.5)
LYMPHOCYTES # BLD: 0.98 K/UL (ref 0.8–3.5)
LYMPHOCYTES NFR BLD: 4.7 % (ref 12–49)
MAGNESIUM SERPL-MCNC: 1.9 MG/DL (ref 1.6–2.4)
MCH RBC QN AUTO: 29.9 PG (ref 26–34)
MCHC RBC AUTO-ENTMCNC: 33.8 G/DL (ref 30–36.5)
MCV RBC AUTO: 88.6 FL (ref 80–99)
MONOCYTES # BLD: 1.77 K/UL (ref 0–1)
MONOCYTES NFR BLD: 8.6 % (ref 5–13)
NEUTS SEG # BLD: 17.53 K/UL (ref 1.8–8)
NEUTS SEG NFR BLD: 85 % (ref 32–75)
NRBC # BLD: 0 K/UL (ref 0–0.01)
NRBC BLD-RTO: 0 PER 100 WBC
PHOSPHATE SERPL-MCNC: 2.3 MG/DL (ref 2.6–4.7)
PLATELET # BLD AUTO: 189 K/UL (ref 150–400)
PMV BLD AUTO: 10.7 FL (ref 8.9–12.9)
POTASSIUM SERPL-SCNC: 3.1 MMOL/L (ref 3.5–5.1)
RBC # BLD AUTO: 4.21 M/UL (ref 3.8–5.2)
SERVICE CMNT-IMP: ABNORMAL
SODIUM SERPL-SCNC: 140 MMOL/L (ref 136–145)
SPECIMEN SOURCE: NORMAL
VANCOMYCIN SERPL-MCNC: 12.5 UG/ML
WBC # BLD AUTO: 20.6 K/UL (ref 3.6–11)

## 2025-01-17 PROCEDURE — 36415 COLL VENOUS BLD VENIPUNCTURE: CPT

## 2025-01-17 PROCEDURE — 6360000002 HC RX W HCPCS: Performed by: STUDENT IN AN ORGANIZED HEALTH CARE EDUCATION/TRAINING PROGRAM

## 2025-01-17 PROCEDURE — 6370000000 HC RX 637 (ALT 250 FOR IP): Performed by: HOSPITALIST

## 2025-01-17 PROCEDURE — 2580000003 HC RX 258: Performed by: INTERNAL MEDICINE

## 2025-01-17 PROCEDURE — 6370000000 HC RX 637 (ALT 250 FOR IP): Performed by: INTERNAL MEDICINE

## 2025-01-17 PROCEDURE — 97535 SELF CARE MNGMENT TRAINING: CPT

## 2025-01-17 PROCEDURE — 84100 ASSAY OF PHOSPHORUS: CPT

## 2025-01-17 PROCEDURE — 97165 OT EVAL LOW COMPLEX 30 MIN: CPT

## 2025-01-17 PROCEDURE — 97161 PT EVAL LOW COMPLEX 20 MIN: CPT

## 2025-01-17 PROCEDURE — 6370000000 HC RX 637 (ALT 250 FOR IP): Performed by: NURSE PRACTITIONER

## 2025-01-17 PROCEDURE — 99232 SBSQ HOSP IP/OBS MODERATE 35: CPT | Performed by: INTERNAL MEDICINE

## 2025-01-17 PROCEDURE — 97530 THERAPEUTIC ACTIVITIES: CPT

## 2025-01-17 PROCEDURE — 2500000003 HC RX 250 WO HCPCS: Performed by: NURSE PRACTITIONER

## 2025-01-17 PROCEDURE — 6360000002 HC RX W HCPCS: Performed by: NURSE PRACTITIONER

## 2025-01-17 PROCEDURE — 2580000003 HC RX 258: Performed by: HOSPITALIST

## 2025-01-17 PROCEDURE — 6360000002 HC RX W HCPCS: Performed by: HOSPITALIST

## 2025-01-17 PROCEDURE — 80202 ASSAY OF VANCOMYCIN: CPT

## 2025-01-17 PROCEDURE — 6370000000 HC RX 637 (ALT 250 FOR IP)

## 2025-01-17 PROCEDURE — 83735 ASSAY OF MAGNESIUM: CPT

## 2025-01-17 PROCEDURE — 2580000003 HC RX 258: Performed by: NURSE PRACTITIONER

## 2025-01-17 PROCEDURE — 85025 COMPLETE CBC W/AUTO DIFF WBC: CPT

## 2025-01-17 PROCEDURE — 99231 SBSQ HOSP IP/OBS SF/LOW 25: CPT

## 2025-01-17 PROCEDURE — 6370000000 HC RX 637 (ALT 250 FOR IP): Performed by: STUDENT IN AN ORGANIZED HEALTH CARE EDUCATION/TRAINING PROGRAM

## 2025-01-17 PROCEDURE — 2500000003 HC RX 250 WO HCPCS: Performed by: HOSPITALIST

## 2025-01-17 PROCEDURE — 2500000003 HC RX 250 WO HCPCS: Performed by: INTERNAL MEDICINE

## 2025-01-17 PROCEDURE — 80048 BASIC METABOLIC PNL TOTAL CA: CPT

## 2025-01-17 PROCEDURE — 2060000000 HC ICU INTERMEDIATE R&B

## 2025-01-17 PROCEDURE — 82962 GLUCOSE BLOOD TEST: CPT

## 2025-01-17 RX ORDER — VANCOMYCIN 1.75 G/350ML
1250 INJECTION, SOLUTION INTRAVENOUS EVERY 12 HOURS
Status: DISCONTINUED | OUTPATIENT
Start: 2025-01-17 | End: 2025-01-20

## 2025-01-17 RX ORDER — LABETALOL HYDROCHLORIDE 5 MG/ML
10 INJECTION, SOLUTION INTRAVENOUS EVERY 6 HOURS PRN
Status: DISCONTINUED | OUTPATIENT
Start: 2025-01-17 | End: 2025-01-23 | Stop reason: HOSPADM

## 2025-01-17 RX ORDER — HYDRALAZINE HYDROCHLORIDE 20 MG/ML
10 INJECTION INTRAMUSCULAR; INTRAVENOUS EVERY 6 HOURS PRN
Status: DISCONTINUED | OUTPATIENT
Start: 2025-01-17 | End: 2025-01-23 | Stop reason: HOSPADM

## 2025-01-17 RX ORDER — POTASSIUM CHLORIDE 1500 MG/1
40 TABLET, EXTENDED RELEASE ORAL ONCE
Status: COMPLETED | OUTPATIENT
Start: 2025-01-17 | End: 2025-01-17

## 2025-01-17 RX ORDER — DEXAMETHASONE SODIUM PHOSPHATE 10 MG/ML
10 INJECTION, SOLUTION INTRAMUSCULAR; INTRAVENOUS EVERY 12 HOURS
Status: DISCONTINUED | OUTPATIENT
Start: 2025-01-18 | End: 2025-01-19

## 2025-01-17 RX ORDER — INSULIN GLARGINE 100 [IU]/ML
22 INJECTION, SOLUTION SUBCUTANEOUS DAILY
Status: DISCONTINUED | OUTPATIENT
Start: 2025-01-17 | End: 2025-01-23 | Stop reason: HOSPADM

## 2025-01-17 RX ADMIN — VASOPRESSIN: 20 INJECTION, SOLUTION INTRAVENOUS at 05:13

## 2025-01-17 RX ADMIN — WATER 2000 MG: 1 INJECTION INTRAMUSCULAR; INTRAVENOUS; SUBCUTANEOUS at 10:34

## 2025-01-17 RX ADMIN — DEXAMETHASONE SODIUM PHOSPHATE 10 MG: 10 INJECTION, SOLUTION INTRAMUSCULAR; INTRAVENOUS at 03:20

## 2025-01-17 RX ADMIN — INSULIN HUMAN 22 UNITS: 100 INJECTION, SUSPENSION SUBCUTANEOUS at 14:28

## 2025-01-17 RX ADMIN — VANCOMYCIN 1250 MG: 1.75 INJECTION, SOLUTION INTRAVENOUS at 20:49

## 2025-01-17 RX ADMIN — ACYCLOVIR SODIUM 750 MG: 50 INJECTION, SOLUTION INTRAVENOUS at 01:08

## 2025-01-17 RX ADMIN — Medication 1 AMPULE: at 10:35

## 2025-01-17 RX ADMIN — INSULIN HUMAN 18 UNITS: 100 INJECTION, SUSPENSION SUBCUTANEOUS at 03:19

## 2025-01-17 RX ADMIN — DEXAMETHASONE SODIUM PHOSPHATE 10 MG: 10 INJECTION, SOLUTION INTRAMUSCULAR; INTRAVENOUS at 14:37

## 2025-01-17 RX ADMIN — ACYCLOVIR SODIUM 750 MG: 50 INJECTION, SOLUTION INTRAVENOUS at 23:23

## 2025-01-17 RX ADMIN — ATORVASTATIN CALCIUM 20 MG: 20 TABLET, FILM COATED ORAL at 10:20

## 2025-01-17 RX ADMIN — SODIUM CHLORIDE, PRESERVATIVE FREE 10 ML: 5 INJECTION INTRAVENOUS at 20:43

## 2025-01-17 RX ADMIN — LOSARTAN POTASSIUM 100 MG: 100 TABLET, FILM COATED ORAL at 10:20

## 2025-01-17 RX ADMIN — ASPIRIN 81 MG: 81 TABLET, COATED ORAL at 10:20

## 2025-01-17 RX ADMIN — POTASSIUM CHLORIDE 40 MEQ: 1500 TABLET, EXTENDED RELEASE ORAL at 10:20

## 2025-01-17 RX ADMIN — INSULIN LISPRO 4 UNITS: 100 INJECTION, SOLUTION INTRAVENOUS; SUBCUTANEOUS at 12:49

## 2025-01-17 RX ADMIN — SODIUM CHLORIDE, PRESERVATIVE FREE 10 ML: 5 INJECTION INTRAVENOUS at 10:46

## 2025-01-17 RX ADMIN — GUAIFENESIN 600 MG: 600 TABLET ORAL at 20:43

## 2025-01-17 RX ADMIN — WATER 2000 MG: 1 INJECTION INTRAMUSCULAR; INTRAVENOUS; SUBCUTANEOUS at 22:26

## 2025-01-17 RX ADMIN — ACYCLOVIR SODIUM 750 MG: 50 INJECTION, SOLUTION INTRAVENOUS at 12:40

## 2025-01-17 RX ADMIN — VANCOMYCIN HYDROCHLORIDE 1000 MG: 1 INJECTION, POWDER, LYOPHILIZED, FOR SOLUTION INTRAVENOUS at 10:40

## 2025-01-17 RX ADMIN — INSULIN LISPRO 2 UNITS: 100 INJECTION, SOLUTION INTRAVENOUS; SUBCUTANEOUS at 10:41

## 2025-01-17 RX ADMIN — NIFEDIPINE 60 MG: 30 TABLET, FILM COATED, EXTENDED RELEASE ORAL at 10:20

## 2025-01-17 RX ADMIN — INSULIN GLARGINE 22 UNITS: 100 INJECTION, SOLUTION SUBCUTANEOUS at 10:35

## 2025-01-17 RX ADMIN — GUAIFENESIN 600 MG: 600 TABLET ORAL at 10:20

## 2025-01-17 RX ADMIN — ENOXAPARIN SODIUM 40 MG: 100 INJECTION SUBCUTANEOUS at 10:35

## 2025-01-17 RX ADMIN — INSULIN LISPRO 4 UNITS: 100 INJECTION, SOLUTION INTRAVENOUS; SUBCUTANEOUS at 17:16

## 2025-01-17 RX ADMIN — INSULIN LISPRO 2 UNITS: 100 INJECTION, SOLUTION INTRAVENOUS; SUBCUTANEOUS at 20:43

## 2025-01-17 RX ADMIN — SODIUM CHLORIDE 50 ML: 9 INJECTION, SOLUTION INTRAVENOUS at 01:07

## 2025-01-17 RX ADMIN — Medication 1 AMPULE: at 22:33

## 2025-01-17 ASSESSMENT — PAIN SCALES - GENERAL
PAINLEVEL_OUTOF10: 0

## 2025-01-18 LAB
ANION GAP SERPL CALC-SCNC: 7 MMOL/L (ref 2–12)
BASOPHILS # BLD: 0.04 K/UL (ref 0–0.1)
BASOPHILS NFR BLD: 0.2 % (ref 0–1)
BUN SERPL-MCNC: 24 MG/DL (ref 6–20)
BUN/CREAT SERPL: 37 (ref 12–20)
CALCIUM SERPL-MCNC: 9.3 MG/DL (ref 8.5–10.1)
CHLORIDE SERPL-SCNC: 107 MMOL/L (ref 97–108)
CO2 SERPL-SCNC: 24 MMOL/L (ref 21–32)
CREAT SERPL-MCNC: 0.65 MG/DL (ref 0.55–1.02)
DIFFERENTIAL METHOD BLD: ABNORMAL
EOSINOPHIL # BLD: 0 K/UL (ref 0–0.4)
EOSINOPHIL NFR BLD: 0 % (ref 0–7)
ERYTHROCYTE [DISTWIDTH] IN BLOOD BY AUTOMATED COUNT: 13.4 % (ref 11.5–14.5)
GLUCOSE BLD STRIP.AUTO-MCNC: 206 MG/DL (ref 65–117)
GLUCOSE BLD STRIP.AUTO-MCNC: 210 MG/DL (ref 65–117)
GLUCOSE BLD STRIP.AUTO-MCNC: 223 MG/DL (ref 65–117)
GLUCOSE BLD STRIP.AUTO-MCNC: 269 MG/DL (ref 65–117)
GLUCOSE SERPL-MCNC: 202 MG/DL (ref 65–100)
HCT VFR BLD AUTO: 38.6 % (ref 35–47)
HGB BLD-MCNC: 13.1 G/DL (ref 11.5–16)
IMM GRANULOCYTES # BLD AUTO: 0.24 K/UL (ref 0–0.04)
IMM GRANULOCYTES NFR BLD AUTO: 1.4 % (ref 0–0.5)
LYMPHOCYTES # BLD: 0.88 K/UL (ref 0.8–3.5)
LYMPHOCYTES NFR BLD: 5.2 % (ref 12–49)
MAGNESIUM SERPL-MCNC: 2 MG/DL (ref 1.6–2.4)
MCH RBC QN AUTO: 29.6 PG (ref 26–34)
MCHC RBC AUTO-ENTMCNC: 33.9 G/DL (ref 30–36.5)
MCV RBC AUTO: 87.1 FL (ref 80–99)
MONOCYTES # BLD: 1.53 K/UL (ref 0–1)
MONOCYTES NFR BLD: 9.1 % (ref 5–13)
NEUTS SEG # BLD: 14.15 K/UL (ref 1.8–8)
NEUTS SEG NFR BLD: 84.1 % (ref 32–75)
NRBC # BLD: 0 K/UL (ref 0–0.01)
NRBC BLD-RTO: 0 PER 100 WBC
PHOSPHATE SERPL-MCNC: 2.3 MG/DL (ref 2.6–4.7)
PLATELET # BLD AUTO: 194 K/UL (ref 150–400)
PMV BLD AUTO: 10.4 FL (ref 8.9–12.9)
POTASSIUM SERPL-SCNC: 3.1 MMOL/L (ref 3.5–5.1)
RBC # BLD AUTO: 4.43 M/UL (ref 3.8–5.2)
SERVICE CMNT-IMP: ABNORMAL
SODIUM SERPL-SCNC: 138 MMOL/L (ref 136–145)
WBC # BLD AUTO: 16.8 K/UL (ref 3.6–11)

## 2025-01-18 PROCEDURE — 80048 BASIC METABOLIC PNL TOTAL CA: CPT

## 2025-01-18 PROCEDURE — 2500000003 HC RX 250 WO HCPCS: Performed by: NURSE PRACTITIONER

## 2025-01-18 PROCEDURE — 6370000000 HC RX 637 (ALT 250 FOR IP): Performed by: NURSE PRACTITIONER

## 2025-01-18 PROCEDURE — 6360000002 HC RX W HCPCS: Performed by: NURSE PRACTITIONER

## 2025-01-18 PROCEDURE — 83735 ASSAY OF MAGNESIUM: CPT

## 2025-01-18 PROCEDURE — 6360000002 HC RX W HCPCS

## 2025-01-18 PROCEDURE — 2060000000 HC ICU INTERMEDIATE R&B

## 2025-01-18 PROCEDURE — 6370000000 HC RX 637 (ALT 250 FOR IP): Performed by: HOSPITALIST

## 2025-01-18 PROCEDURE — 36415 COLL VENOUS BLD VENIPUNCTURE: CPT

## 2025-01-18 PROCEDURE — 6360000002 HC RX W HCPCS: Performed by: STUDENT IN AN ORGANIZED HEALTH CARE EDUCATION/TRAINING PROGRAM

## 2025-01-18 PROCEDURE — 6370000000 HC RX 637 (ALT 250 FOR IP)

## 2025-01-18 PROCEDURE — 82962 GLUCOSE BLOOD TEST: CPT

## 2025-01-18 PROCEDURE — 85025 COMPLETE CBC W/AUTO DIFF WBC: CPT

## 2025-01-18 PROCEDURE — 84100 ASSAY OF PHOSPHORUS: CPT

## 2025-01-18 PROCEDURE — 6370000000 HC RX 637 (ALT 250 FOR IP): Performed by: STUDENT IN AN ORGANIZED HEALTH CARE EDUCATION/TRAINING PROGRAM

## 2025-01-18 PROCEDURE — 2500000003 HC RX 250 WO HCPCS: Performed by: INTERNAL MEDICINE

## 2025-01-18 PROCEDURE — 6370000000 HC RX 637 (ALT 250 FOR IP): Performed by: INTERNAL MEDICINE

## 2025-01-18 RX ORDER — POTASSIUM CHLORIDE 1500 MG/1
40 TABLET, EXTENDED RELEASE ORAL 2 TIMES DAILY
Status: COMPLETED | OUTPATIENT
Start: 2025-01-18 | End: 2025-01-18

## 2025-01-18 RX ADMIN — SODIUM CHLORIDE, PRESERVATIVE FREE 10 ML: 5 INJECTION INTRAVENOUS at 10:10

## 2025-01-18 RX ADMIN — VANCOMYCIN 1250 MG: 1.75 INJECTION, SOLUTION INTRAVENOUS at 10:14

## 2025-01-18 RX ADMIN — INSULIN HUMAN 22 UNITS: 100 INJECTION, SUSPENSION SUBCUTANEOUS at 14:32

## 2025-01-18 RX ADMIN — Medication 1 AMPULE: at 20:38

## 2025-01-18 RX ADMIN — NIFEDIPINE 60 MG: 30 TABLET, FILM COATED, EXTENDED RELEASE ORAL at 09:23

## 2025-01-18 RX ADMIN — INSULIN GLARGINE 22 UNITS: 100 INJECTION, SOLUTION SUBCUTANEOUS at 09:23

## 2025-01-18 RX ADMIN — POTASSIUM CHLORIDE 40 MEQ: 1500 TABLET, EXTENDED RELEASE ORAL at 10:10

## 2025-01-18 RX ADMIN — INSULIN LISPRO 2 UNITS: 100 INJECTION, SOLUTION INTRAVENOUS; SUBCUTANEOUS at 09:22

## 2025-01-18 RX ADMIN — INSULIN LISPRO 2 UNITS: 100 INJECTION, SOLUTION INTRAVENOUS; SUBCUTANEOUS at 17:20

## 2025-01-18 RX ADMIN — DEXAMETHASONE SODIUM PHOSPHATE 10 MG: 10 INJECTION, SOLUTION INTRAMUSCULAR; INTRAVENOUS at 02:42

## 2025-01-18 RX ADMIN — GUAIFENESIN 600 MG: 600 TABLET ORAL at 09:23

## 2025-01-18 RX ADMIN — VANCOMYCIN 1250 MG: 1.75 INJECTION, SOLUTION INTRAVENOUS at 20:47

## 2025-01-18 RX ADMIN — ENOXAPARIN SODIUM 40 MG: 100 INJECTION SUBCUTANEOUS at 09:22

## 2025-01-18 RX ADMIN — WATER 2000 MG: 1 INJECTION INTRAMUSCULAR; INTRAVENOUS; SUBCUTANEOUS at 22:36

## 2025-01-18 RX ADMIN — SODIUM CHLORIDE, PRESERVATIVE FREE 10 ML: 5 INJECTION INTRAVENOUS at 20:38

## 2025-01-18 RX ADMIN — ATORVASTATIN CALCIUM 20 MG: 20 TABLET, FILM COATED ORAL at 09:23

## 2025-01-18 RX ADMIN — POTASSIUM CHLORIDE 40 MEQ: 1500 TABLET, EXTENDED RELEASE ORAL at 20:38

## 2025-01-18 RX ADMIN — INSULIN HUMAN 22 UNITS: 100 INJECTION, SUSPENSION SUBCUTANEOUS at 02:42

## 2025-01-18 RX ADMIN — INSULIN LISPRO 4 UNITS: 100 INJECTION, SOLUTION INTRAVENOUS; SUBCUTANEOUS at 20:44

## 2025-01-18 RX ADMIN — INSULIN LISPRO 2 UNITS: 100 INJECTION, SOLUTION INTRAVENOUS; SUBCUTANEOUS at 12:34

## 2025-01-18 RX ADMIN — WATER 2000 MG: 1 INJECTION INTRAMUSCULAR; INTRAVENOUS; SUBCUTANEOUS at 12:34

## 2025-01-18 RX ADMIN — DEXAMETHASONE SODIUM PHOSPHATE 10 MG: 10 INJECTION, SOLUTION INTRAMUSCULAR; INTRAVENOUS at 14:32

## 2025-01-18 RX ADMIN — LOSARTAN POTASSIUM 100 MG: 100 TABLET, FILM COATED ORAL at 09:22

## 2025-01-18 RX ADMIN — ASPIRIN 81 MG: 81 TABLET, COATED ORAL at 09:23

## 2025-01-18 RX ADMIN — GUAIFENESIN 600 MG: 600 TABLET ORAL at 20:38

## 2025-01-18 ASSESSMENT — PAIN SCALES - GENERAL
PAINLEVEL_OUTOF10: 0

## 2025-01-19 LAB
ANION GAP SERPL CALC-SCNC: 6 MMOL/L (ref 2–12)
BACTERIA SPEC CULT: NORMAL
BACTERIA SPEC CULT: NORMAL
BASOPHILS # BLD: 0.04 K/UL (ref 0–0.1)
BASOPHILS NFR BLD: 0.3 % (ref 0–1)
BUN SERPL-MCNC: 21 MG/DL (ref 6–20)
BUN/CREAT SERPL: 33 (ref 12–20)
CALCIUM SERPL-MCNC: 9.4 MG/DL (ref 8.5–10.1)
CHLORIDE SERPL-SCNC: 109 MMOL/L (ref 97–108)
CO2 SERPL-SCNC: 24 MMOL/L (ref 21–32)
CREAT SERPL-MCNC: 0.64 MG/DL (ref 0.55–1.02)
DIFFERENTIAL METHOD BLD: ABNORMAL
EOSINOPHIL # BLD: 0.01 K/UL (ref 0–0.4)
EOSINOPHIL NFR BLD: 0.1 % (ref 0–7)
ERYTHROCYTE [DISTWIDTH] IN BLOOD BY AUTOMATED COUNT: 13.4 % (ref 11.5–14.5)
GLUCOSE BLD STRIP.AUTO-MCNC: 187 MG/DL (ref 65–117)
GLUCOSE BLD STRIP.AUTO-MCNC: 194 MG/DL (ref 65–117)
GLUCOSE BLD STRIP.AUTO-MCNC: 199 MG/DL (ref 65–117)
GLUCOSE BLD STRIP.AUTO-MCNC: 207 MG/DL (ref 65–117)
GLUCOSE SERPL-MCNC: 211 MG/DL (ref 65–100)
HCT VFR BLD AUTO: 38 % (ref 35–47)
HGB BLD-MCNC: 12.7 G/DL (ref 11.5–16)
IMM GRANULOCYTES # BLD AUTO: 0.28 K/UL (ref 0–0.04)
IMM GRANULOCYTES NFR BLD AUTO: 1.8 % (ref 0–0.5)
LYMPHOCYTES # BLD: 1.1 K/UL (ref 0.8–3.5)
LYMPHOCYTES NFR BLD: 6.9 % (ref 12–49)
MAGNESIUM SERPL-MCNC: 2 MG/DL (ref 1.6–2.4)
MCH RBC QN AUTO: 29.4 PG (ref 26–34)
MCHC RBC AUTO-ENTMCNC: 33.4 G/DL (ref 30–36.5)
MCV RBC AUTO: 88 FL (ref 80–99)
MONOCYTES # BLD: 1.48 K/UL (ref 0–1)
MONOCYTES NFR BLD: 9.3 % (ref 5–13)
NEUTS SEG # BLD: 12.98 K/UL (ref 1.8–8)
NEUTS SEG NFR BLD: 81.6 % (ref 32–75)
NRBC # BLD: 0 K/UL (ref 0–0.01)
NRBC BLD-RTO: 0 PER 100 WBC
PLATELET # BLD AUTO: 187 K/UL (ref 150–400)
PMV BLD AUTO: 10.5 FL (ref 8.9–12.9)
POTASSIUM SERPL-SCNC: 3.4 MMOL/L (ref 3.5–5.1)
RBC # BLD AUTO: 4.32 M/UL (ref 3.8–5.2)
SERVICE CMNT-IMP: ABNORMAL
SERVICE CMNT-IMP: NORMAL
SERVICE CMNT-IMP: NORMAL
SODIUM SERPL-SCNC: 139 MMOL/L (ref 136–145)
VANCOMYCIN SERPL-MCNC: 14.9 UG/ML
WBC # BLD AUTO: 15.9 K/UL (ref 3.6–11)

## 2025-01-19 PROCEDURE — 6370000000 HC RX 637 (ALT 250 FOR IP)

## 2025-01-19 PROCEDURE — 80202 ASSAY OF VANCOMYCIN: CPT

## 2025-01-19 PROCEDURE — 6360000002 HC RX W HCPCS: Performed by: STUDENT IN AN ORGANIZED HEALTH CARE EDUCATION/TRAINING PROGRAM

## 2025-01-19 PROCEDURE — 2500000003 HC RX 250 WO HCPCS: Performed by: INTERNAL MEDICINE

## 2025-01-19 PROCEDURE — 6370000000 HC RX 637 (ALT 250 FOR IP): Performed by: INTERNAL MEDICINE

## 2025-01-19 PROCEDURE — 36415 COLL VENOUS BLD VENIPUNCTURE: CPT

## 2025-01-19 PROCEDURE — 80048 BASIC METABOLIC PNL TOTAL CA: CPT

## 2025-01-19 PROCEDURE — 82962 GLUCOSE BLOOD TEST: CPT

## 2025-01-19 PROCEDURE — 85025 COMPLETE CBC W/AUTO DIFF WBC: CPT

## 2025-01-19 PROCEDURE — 6360000002 HC RX W HCPCS: Performed by: NURSE PRACTITIONER

## 2025-01-19 PROCEDURE — 83735 ASSAY OF MAGNESIUM: CPT

## 2025-01-19 PROCEDURE — 2060000000 HC ICU INTERMEDIATE R&B

## 2025-01-19 PROCEDURE — 6370000000 HC RX 637 (ALT 250 FOR IP): Performed by: NURSE PRACTITIONER

## 2025-01-19 PROCEDURE — 6370000000 HC RX 637 (ALT 250 FOR IP): Performed by: STUDENT IN AN ORGANIZED HEALTH CARE EDUCATION/TRAINING PROGRAM

## 2025-01-19 PROCEDURE — 6360000002 HC RX W HCPCS

## 2025-01-19 PROCEDURE — 2500000003 HC RX 250 WO HCPCS: Performed by: NURSE PRACTITIONER

## 2025-01-19 PROCEDURE — 6370000000 HC RX 637 (ALT 250 FOR IP): Performed by: HOSPITALIST

## 2025-01-19 RX ORDER — DEXAMETHASONE SODIUM PHOSPHATE 10 MG/ML
8 INJECTION, SOLUTION INTRAMUSCULAR; INTRAVENOUS EVERY 12 HOURS
Status: DISCONTINUED | OUTPATIENT
Start: 2025-01-20 | End: 2025-01-20

## 2025-01-19 RX ORDER — METOPROLOL TARTRATE 25 MG/1
12.5 TABLET, FILM COATED ORAL 2 TIMES DAILY
Status: DISCONTINUED | OUTPATIENT
Start: 2025-01-19 | End: 2025-01-23 | Stop reason: HOSPADM

## 2025-01-19 RX ORDER — POTASSIUM CHLORIDE 1500 MG/1
40 TABLET, EXTENDED RELEASE ORAL ONCE
Status: COMPLETED | OUTPATIENT
Start: 2025-01-19 | End: 2025-01-19

## 2025-01-19 RX ADMIN — VANCOMYCIN 1250 MG: 1.75 INJECTION, SOLUTION INTRAVENOUS at 20:26

## 2025-01-19 RX ADMIN — INSULIN HUMAN 22 UNITS: 100 INJECTION, SUSPENSION SUBCUTANEOUS at 01:54

## 2025-01-19 RX ADMIN — INSULIN LISPRO 2 UNITS: 100 INJECTION, SOLUTION INTRAVENOUS; SUBCUTANEOUS at 20:32

## 2025-01-19 RX ADMIN — WATER 2000 MG: 1 INJECTION INTRAMUSCULAR; INTRAVENOUS; SUBCUTANEOUS at 22:56

## 2025-01-19 RX ADMIN — WATER 2000 MG: 1 INJECTION INTRAMUSCULAR; INTRAVENOUS; SUBCUTANEOUS at 12:22

## 2025-01-19 RX ADMIN — SODIUM CHLORIDE, PRESERVATIVE FREE 10 ML: 5 INJECTION INTRAVENOUS at 20:28

## 2025-01-19 RX ADMIN — VANCOMYCIN 1250 MG: 1.75 INJECTION, SOLUTION INTRAVENOUS at 10:26

## 2025-01-19 RX ADMIN — SODIUM CHLORIDE, PRESERVATIVE FREE 10 ML: 5 INJECTION INTRAVENOUS at 09:25

## 2025-01-19 RX ADMIN — INSULIN LISPRO 2 UNITS: 100 INJECTION, SOLUTION INTRAVENOUS; SUBCUTANEOUS at 12:22

## 2025-01-19 RX ADMIN — DEXAMETHASONE SODIUM PHOSPHATE 10 MG: 10 INJECTION, SOLUTION INTRAMUSCULAR; INTRAVENOUS at 01:53

## 2025-01-19 RX ADMIN — LOSARTAN POTASSIUM 100 MG: 100 TABLET, FILM COATED ORAL at 09:20

## 2025-01-19 RX ADMIN — Medication 1 AMPULE: at 09:19

## 2025-01-19 RX ADMIN — ENOXAPARIN SODIUM 40 MG: 100 INJECTION SUBCUTANEOUS at 09:20

## 2025-01-19 RX ADMIN — POTASSIUM CHLORIDE 40 MEQ: 1500 TABLET, EXTENDED RELEASE ORAL at 10:20

## 2025-01-19 RX ADMIN — INSULIN LISPRO 2 UNITS: 100 INJECTION, SOLUTION INTRAVENOUS; SUBCUTANEOUS at 09:25

## 2025-01-19 RX ADMIN — METOPROLOL TARTRATE 12.5 MG: 25 TABLET, FILM COATED ORAL at 10:19

## 2025-01-19 RX ADMIN — ASPIRIN 81 MG: 81 TABLET, COATED ORAL at 09:20

## 2025-01-19 RX ADMIN — ATORVASTATIN CALCIUM 20 MG: 20 TABLET, FILM COATED ORAL at 09:20

## 2025-01-19 RX ADMIN — GUAIFENESIN 600 MG: 600 TABLET ORAL at 20:27

## 2025-01-19 RX ADMIN — GUAIFENESIN 600 MG: 600 TABLET ORAL at 09:20

## 2025-01-19 RX ADMIN — Medication 1 AMPULE: at 20:27

## 2025-01-19 RX ADMIN — INSULIN HUMAN 22 UNITS: 100 INJECTION, SUSPENSION SUBCUTANEOUS at 13:49

## 2025-01-19 RX ADMIN — INSULIN LISPRO 2 UNITS: 100 INJECTION, SOLUTION INTRAVENOUS; SUBCUTANEOUS at 17:35

## 2025-01-19 RX ADMIN — METOPROLOL TARTRATE 12.5 MG: 25 TABLET, FILM COATED ORAL at 20:27

## 2025-01-19 RX ADMIN — INSULIN GLARGINE 22 UNITS: 100 INJECTION, SOLUTION SUBCUTANEOUS at 09:26

## 2025-01-19 RX ADMIN — DEXAMETHASONE SODIUM PHOSPHATE 10 MG: 10 INJECTION, SOLUTION INTRAMUSCULAR; INTRAVENOUS at 13:50

## 2025-01-19 RX ADMIN — NIFEDIPINE 60 MG: 30 TABLET, FILM COATED, EXTENDED RELEASE ORAL at 09:20

## 2025-01-19 ASSESSMENT — PAIN SCALES - GENERAL
PAINLEVEL_OUTOF10: 0

## 2025-01-20 LAB
ANION GAP SERPL CALC-SCNC: 4 MMOL/L (ref 2–12)
BACTERIA SPEC CULT: NORMAL
BASOPHILS # BLD: 0.07 K/UL (ref 0–0.1)
BASOPHILS NFR BLD: 0.4 % (ref 0–1)
BUN SERPL-MCNC: 21 MG/DL (ref 6–20)
BUN/CREAT SERPL: 40 (ref 12–20)
CALCIUM SERPL-MCNC: 9.4 MG/DL (ref 8.5–10.1)
CHLORIDE SERPL-SCNC: 110 MMOL/L (ref 97–108)
CO2 SERPL-SCNC: 26 MMOL/L (ref 21–32)
CREAT SERPL-MCNC: 0.53 MG/DL (ref 0.55–1.02)
DIFFERENTIAL METHOD BLD: ABNORMAL
EOSINOPHIL # BLD: 0.02 K/UL (ref 0–0.4)
EOSINOPHIL NFR BLD: 0.1 % (ref 0–7)
ERYTHROCYTE [DISTWIDTH] IN BLOOD BY AUTOMATED COUNT: 13.5 % (ref 11.5–14.5)
GLUCOSE BLD STRIP.AUTO-MCNC: 158 MG/DL (ref 65–117)
GLUCOSE BLD STRIP.AUTO-MCNC: 271 MG/DL (ref 65–117)
GLUCOSE BLD STRIP.AUTO-MCNC: 312 MG/DL (ref 65–117)
GLUCOSE BLD STRIP.AUTO-MCNC: 317 MG/DL (ref 65–117)
GLUCOSE BLD STRIP.AUTO-MCNC: 336 MG/DL (ref 65–117)
GLUCOSE SERPL-MCNC: 126 MG/DL (ref 65–100)
HCT VFR BLD AUTO: 39.5 % (ref 35–47)
HGB BLD-MCNC: 12.9 G/DL (ref 11.5–16)
IMM GRANULOCYTES # BLD AUTO: 0.35 K/UL (ref 0–0.04)
IMM GRANULOCYTES NFR BLD AUTO: 2 % (ref 0–0.5)
LYMPHOCYTES # BLD: 1.37 K/UL (ref 0.8–3.5)
LYMPHOCYTES NFR BLD: 7.9 % (ref 12–49)
MAGNESIUM SERPL-MCNC: 2 MG/DL (ref 1.6–2.4)
MCH RBC QN AUTO: 29.7 PG (ref 26–34)
MCHC RBC AUTO-ENTMCNC: 32.7 G/DL (ref 30–36.5)
MCV RBC AUTO: 90.8 FL (ref 80–99)
MONOCYTES # BLD: 1.45 K/UL (ref 0–1)
MONOCYTES NFR BLD: 8.4 % (ref 5–13)
NEUTS SEG # BLD: 14.05 K/UL (ref 1.8–8)
NEUTS SEG NFR BLD: 81.2 % (ref 32–75)
NRBC # BLD: 0 K/UL (ref 0–0.01)
NRBC BLD-RTO: 0 PER 100 WBC
PLATELET # BLD AUTO: 238 K/UL (ref 150–400)
PMV BLD AUTO: 10.8 FL (ref 8.9–12.9)
POTASSIUM SERPL-SCNC: 3.5 MMOL/L (ref 3.5–5.1)
RBC # BLD AUTO: 4.35 M/UL (ref 3.8–5.2)
SERVICE CMNT-IMP: ABNORMAL
SERVICE CMNT-IMP: NORMAL
SODIUM SERPL-SCNC: 140 MMOL/L (ref 136–145)
WBC # BLD AUTO: 17.3 K/UL (ref 3.6–11)

## 2025-01-20 PROCEDURE — 6370000000 HC RX 637 (ALT 250 FOR IP): Performed by: STUDENT IN AN ORGANIZED HEALTH CARE EDUCATION/TRAINING PROGRAM

## 2025-01-20 PROCEDURE — 80048 BASIC METABOLIC PNL TOTAL CA: CPT

## 2025-01-20 PROCEDURE — 6370000000 HC RX 637 (ALT 250 FOR IP): Performed by: INTERNAL MEDICINE

## 2025-01-20 PROCEDURE — 6370000000 HC RX 637 (ALT 250 FOR IP): Performed by: NURSE PRACTITIONER

## 2025-01-20 PROCEDURE — 6370000000 HC RX 637 (ALT 250 FOR IP)

## 2025-01-20 PROCEDURE — 6360000002 HC RX W HCPCS: Performed by: INTERNAL MEDICINE

## 2025-01-20 PROCEDURE — 97530 THERAPEUTIC ACTIVITIES: CPT

## 2025-01-20 PROCEDURE — 2500000003 HC RX 250 WO HCPCS: Performed by: INTERNAL MEDICINE

## 2025-01-20 PROCEDURE — 6370000000 HC RX 637 (ALT 250 FOR IP): Performed by: HOSPITALIST

## 2025-01-20 PROCEDURE — 83735 ASSAY OF MAGNESIUM: CPT

## 2025-01-20 PROCEDURE — 82962 GLUCOSE BLOOD TEST: CPT

## 2025-01-20 PROCEDURE — 97535 SELF CARE MNGMENT TRAINING: CPT

## 2025-01-20 PROCEDURE — 36415 COLL VENOUS BLD VENIPUNCTURE: CPT

## 2025-01-20 PROCEDURE — 97116 GAIT TRAINING THERAPY: CPT

## 2025-01-20 PROCEDURE — 6360000002 HC RX W HCPCS: Performed by: STUDENT IN AN ORGANIZED HEALTH CARE EDUCATION/TRAINING PROGRAM

## 2025-01-20 PROCEDURE — 99233 SBSQ HOSP IP/OBS HIGH 50: CPT | Performed by: INTERNAL MEDICINE

## 2025-01-20 PROCEDURE — 2060000000 HC ICU INTERMEDIATE R&B

## 2025-01-20 PROCEDURE — 85025 COMPLETE CBC W/AUTO DIFF WBC: CPT

## 2025-01-20 PROCEDURE — 99231 SBSQ HOSP IP/OBS SF/LOW 25: CPT

## 2025-01-20 PROCEDURE — 6360000002 HC RX W HCPCS: Performed by: NURSE PRACTITIONER

## 2025-01-20 RX ORDER — VANCOMYCIN 1.75 G/350ML
1250 INJECTION, SOLUTION INTRAVENOUS EVERY 12 HOURS
Status: COMPLETED | OUTPATIENT
Start: 2025-01-20 | End: 2025-01-21

## 2025-01-20 RX ORDER — DEXAMETHASONE SODIUM PHOSPHATE 10 MG/ML
6 INJECTION, SOLUTION INTRAMUSCULAR; INTRAVENOUS EVERY 12 HOURS
Status: DISCONTINUED | OUTPATIENT
Start: 2025-01-20 | End: 2025-01-21

## 2025-01-20 RX ADMIN — ENOXAPARIN SODIUM 40 MG: 100 INJECTION SUBCUTANEOUS at 10:01

## 2025-01-20 RX ADMIN — INSULIN HUMAN 22 UNITS: 100 INJECTION, SUSPENSION SUBCUTANEOUS at 14:13

## 2025-01-20 RX ADMIN — VANCOMYCIN 1250 MG: 1.75 INJECTION, SOLUTION INTRAVENOUS at 20:56

## 2025-01-20 RX ADMIN — SODIUM CHLORIDE, PRESERVATIVE FREE 10 ML: 5 INJECTION INTRAVENOUS at 20:50

## 2025-01-20 RX ADMIN — VANCOMYCIN 1250 MG: 1.75 INJECTION, SOLUTION INTRAVENOUS at 10:06

## 2025-01-20 RX ADMIN — NIFEDIPINE 60 MG: 30 TABLET, FILM COATED, EXTENDED RELEASE ORAL at 09:55

## 2025-01-20 RX ADMIN — METOPROLOL TARTRATE 12.5 MG: 25 TABLET, FILM COATED ORAL at 09:56

## 2025-01-20 RX ADMIN — DEXAMETHASONE SODIUM PHOSPHATE 8 MG: 10 INJECTION, SOLUTION INTRAMUSCULAR; INTRAVENOUS at 02:44

## 2025-01-20 RX ADMIN — SODIUM CHLORIDE, PRESERVATIVE FREE 10 ML: 5 INJECTION INTRAVENOUS at 10:01

## 2025-01-20 RX ADMIN — GUAIFENESIN 600 MG: 600 TABLET ORAL at 20:52

## 2025-01-20 RX ADMIN — DEXAMETHASONE SODIUM PHOSPHATE 6 MG: 10 INJECTION, SOLUTION INTRAMUSCULAR; INTRAVENOUS at 14:14

## 2025-01-20 RX ADMIN — INSULIN GLARGINE 22 UNITS: 100 INJECTION, SOLUTION SUBCUTANEOUS at 10:00

## 2025-01-20 RX ADMIN — INSULIN LISPRO 6 UNITS: 100 INJECTION, SOLUTION INTRAVENOUS; SUBCUTANEOUS at 14:14

## 2025-01-20 RX ADMIN — ATORVASTATIN CALCIUM 20 MG: 20 TABLET, FILM COATED ORAL at 09:56

## 2025-01-20 RX ADMIN — INSULIN LISPRO 4 UNITS: 100 INJECTION, SOLUTION INTRAVENOUS; SUBCUTANEOUS at 20:50

## 2025-01-20 RX ADMIN — METOPROLOL TARTRATE 12.5 MG: 25 TABLET, FILM COATED ORAL at 20:51

## 2025-01-20 RX ADMIN — INSULIN LISPRO 4 UNITS: 100 INJECTION, SOLUTION INTRAVENOUS; SUBCUTANEOUS at 18:25

## 2025-01-20 RX ADMIN — Medication 1 AMPULE: at 12:41

## 2025-01-20 RX ADMIN — LOSARTAN POTASSIUM 100 MG: 100 TABLET, FILM COATED ORAL at 12:42

## 2025-01-20 RX ADMIN — ASPIRIN 81 MG: 81 TABLET, COATED ORAL at 09:55

## 2025-01-20 RX ADMIN — INSULIN HUMAN 22 UNITS: 100 INJECTION, SUSPENSION SUBCUTANEOUS at 02:44

## 2025-01-20 RX ADMIN — GUAIFENESIN 600 MG: 600 TABLET ORAL at 09:55

## 2025-01-20 RX ADMIN — Medication 1 AMPULE: at 20:52

## 2025-01-20 ASSESSMENT — PAIN SCALES - GENERAL
PAINLEVEL_OUTOF10: 0

## 2025-01-21 PROBLEM — R50.82 POSTPROCEDURAL FEVER: Status: ACTIVE | Noted: 2025-01-15

## 2025-01-21 PROBLEM — N17.9 AKI (ACUTE KIDNEY INJURY) (HCC): Status: ACTIVE | Noted: 2025-01-21

## 2025-01-21 PROBLEM — R41.82 ALTERED MENTAL STATUS: Status: ACTIVE | Noted: 2025-01-21

## 2025-01-21 PROBLEM — J96.01 ACUTE HYPOXIC RESPIRATORY FAILURE: Status: ACTIVE | Noted: 2025-01-21

## 2025-01-21 LAB
ANION GAP SERPL CALC-SCNC: 8 MMOL/L (ref 2–12)
BASOPHILS # BLD: 0 K/UL (ref 0–0.1)
BASOPHILS NFR BLD: 0 % (ref 0–1)
BUN SERPL-MCNC: 25 MG/DL (ref 6–20)
BUN/CREAT SERPL: 44 (ref 12–20)
CALCIUM SERPL-MCNC: 9.7 MG/DL (ref 8.5–10.1)
CHLORIDE SERPL-SCNC: 108 MMOL/L (ref 97–108)
CO2 SERPL-SCNC: 23 MMOL/L (ref 21–32)
CREAT SERPL-MCNC: 0.57 MG/DL (ref 0.55–1.02)
DIFFERENTIAL METHOD BLD: ABNORMAL
EOSINOPHIL # BLD: 0 K/UL (ref 0–0.4)
EOSINOPHIL NFR BLD: 0 % (ref 0–7)
ERYTHROCYTE [DISTWIDTH] IN BLOOD BY AUTOMATED COUNT: 13.6 % (ref 11.5–14.5)
GLUCOSE BLD STRIP.AUTO-MCNC: 164 MG/DL (ref 65–117)
GLUCOSE BLD STRIP.AUTO-MCNC: 269 MG/DL (ref 65–117)
GLUCOSE BLD STRIP.AUTO-MCNC: 269 MG/DL (ref 65–117)
GLUCOSE BLD STRIP.AUTO-MCNC: 288 MG/DL (ref 65–117)
GLUCOSE SERPL-MCNC: 169 MG/DL (ref 65–100)
HCT VFR BLD AUTO: 32.8 % (ref 35–47)
HGB BLD-MCNC: 11 G/DL (ref 11.5–16)
IMM GRANULOCYTES # BLD AUTO: 0 K/UL (ref 0–0.04)
IMM GRANULOCYTES NFR BLD AUTO: 0 % (ref 0–0.5)
LYMPHOCYTES # BLD: 1.15 K/UL (ref 0.8–3.5)
LYMPHOCYTES NFR BLD: 6 % (ref 12–49)
MAGNESIUM SERPL-MCNC: 1.9 MG/DL (ref 1.6–2.4)
MCH RBC QN AUTO: 30.4 PG (ref 26–34)
MCHC RBC AUTO-ENTMCNC: 33.5 G/DL (ref 30–36.5)
MCV RBC AUTO: 90.6 FL (ref 80–99)
MONOCYTES # BLD: 1.92 K/UL (ref 0–1)
MONOCYTES NFR BLD: 10 % (ref 5–13)
NEUTS SEG # BLD: 16.13 K/UL (ref 1.8–8)
NEUTS SEG NFR BLD: 84 % (ref 32–75)
NRBC # BLD: 0 K/UL (ref 0–0.01)
NRBC BLD-RTO: 0 PER 100 WBC
PLATELET # BLD AUTO: 251 K/UL (ref 150–400)
PMV BLD AUTO: 10.8 FL (ref 8.9–12.9)
POTASSIUM SERPL-SCNC: 3.2 MMOL/L (ref 3.5–5.1)
RBC # BLD AUTO: 3.62 M/UL (ref 3.8–5.2)
RBC MORPH BLD: ABNORMAL
RBC MORPH BLD: ABNORMAL
SERVICE CMNT-IMP: ABNORMAL
SODIUM SERPL-SCNC: 139 MMOL/L (ref 136–145)
WBC # BLD AUTO: 19.2 K/UL (ref 3.6–11)

## 2025-01-21 PROCEDURE — 6360000002 HC RX W HCPCS: Performed by: NURSE PRACTITIONER

## 2025-01-21 PROCEDURE — 2500000003 HC RX 250 WO HCPCS: Performed by: INTERNAL MEDICINE

## 2025-01-21 PROCEDURE — 97535 SELF CARE MNGMENT TRAINING: CPT

## 2025-01-21 PROCEDURE — 36415 COLL VENOUS BLD VENIPUNCTURE: CPT

## 2025-01-21 PROCEDURE — 6370000000 HC RX 637 (ALT 250 FOR IP): Performed by: INTERNAL MEDICINE

## 2025-01-21 PROCEDURE — 6370000000 HC RX 637 (ALT 250 FOR IP): Performed by: NURSE PRACTITIONER

## 2025-01-21 PROCEDURE — 6360000002 HC RX W HCPCS: Performed by: INTERNAL MEDICINE

## 2025-01-21 PROCEDURE — 83735 ASSAY OF MAGNESIUM: CPT

## 2025-01-21 PROCEDURE — 80048 BASIC METABOLIC PNL TOTAL CA: CPT

## 2025-01-21 PROCEDURE — 99231 SBSQ HOSP IP/OBS SF/LOW 25: CPT

## 2025-01-21 PROCEDURE — 6370000000 HC RX 637 (ALT 250 FOR IP): Performed by: STUDENT IN AN ORGANIZED HEALTH CARE EDUCATION/TRAINING PROGRAM

## 2025-01-21 PROCEDURE — 85025 COMPLETE CBC W/AUTO DIFF WBC: CPT

## 2025-01-21 PROCEDURE — 6360000002 HC RX W HCPCS: Performed by: STUDENT IN AN ORGANIZED HEALTH CARE EDUCATION/TRAINING PROGRAM

## 2025-01-21 PROCEDURE — 82962 GLUCOSE BLOOD TEST: CPT

## 2025-01-21 PROCEDURE — 2060000000 HC ICU INTERMEDIATE R&B

## 2025-01-21 PROCEDURE — 6370000000 HC RX 637 (ALT 250 FOR IP): Performed by: HOSPITALIST

## 2025-01-21 PROCEDURE — 6370000000 HC RX 637 (ALT 250 FOR IP)

## 2025-01-21 RX ORDER — POTASSIUM CHLORIDE 750 MG/1
40 TABLET, EXTENDED RELEASE ORAL ONCE
Status: COMPLETED | OUTPATIENT
Start: 2025-01-21 | End: 2025-01-21

## 2025-01-21 RX ORDER — INSULIN LISPRO 100 [IU]/ML
2 INJECTION, SOLUTION INTRAVENOUS; SUBCUTANEOUS
Status: DISCONTINUED | OUTPATIENT
Start: 2025-01-21 | End: 2025-01-23 | Stop reason: HOSPADM

## 2025-01-21 RX ORDER — PREDNISONE 20 MG/1
40 TABLET ORAL DAILY
Status: DISCONTINUED | OUTPATIENT
Start: 2025-01-22 | End: 2025-01-23 | Stop reason: HOSPADM

## 2025-01-21 RX ADMIN — VANCOMYCIN 1250 MG: 1.75 INJECTION, SOLUTION INTRAVENOUS at 09:15

## 2025-01-21 RX ADMIN — INSULIN LISPRO 4 UNITS: 100 INJECTION, SOLUTION INTRAVENOUS; SUBCUTANEOUS at 12:41

## 2025-01-21 RX ADMIN — INSULIN HUMAN 22 UNITS: 100 INJECTION, SUSPENSION SUBCUTANEOUS at 13:44

## 2025-01-21 RX ADMIN — INSULIN LISPRO 4 UNITS: 100 INJECTION, SOLUTION INTRAVENOUS; SUBCUTANEOUS at 20:49

## 2025-01-21 RX ADMIN — GUAIFENESIN 600 MG: 600 TABLET ORAL at 20:39

## 2025-01-21 RX ADMIN — INSULIN GLARGINE 22 UNITS: 100 INJECTION, SOLUTION SUBCUTANEOUS at 08:53

## 2025-01-21 RX ADMIN — INSULIN LISPRO 4 UNITS: 100 INJECTION, SOLUTION INTRAVENOUS; SUBCUTANEOUS at 17:06

## 2025-01-21 RX ADMIN — ASPIRIN 81 MG: 81 TABLET, COATED ORAL at 08:53

## 2025-01-21 RX ADMIN — LOSARTAN POTASSIUM 100 MG: 100 TABLET, FILM COATED ORAL at 08:52

## 2025-01-21 RX ADMIN — METOPROLOL TARTRATE 12.5 MG: 25 TABLET, FILM COATED ORAL at 08:53

## 2025-01-21 RX ADMIN — SODIUM CHLORIDE, PRESERVATIVE FREE 10 ML: 5 INJECTION INTRAVENOUS at 20:41

## 2025-01-21 RX ADMIN — DEXAMETHASONE SODIUM PHOSPHATE 6 MG: 10 INJECTION, SOLUTION INTRAMUSCULAR; INTRAVENOUS at 01:29

## 2025-01-21 RX ADMIN — INSULIN LISPRO 2 UNITS: 100 INJECTION, SOLUTION INTRAVENOUS; SUBCUTANEOUS at 17:07

## 2025-01-21 RX ADMIN — NIFEDIPINE 60 MG: 30 TABLET, FILM COATED, EXTENDED RELEASE ORAL at 08:53

## 2025-01-21 RX ADMIN — Medication 1 AMPULE: at 08:54

## 2025-01-21 RX ADMIN — DEXAMETHASONE SODIUM PHOSPHATE 6 MG: 10 INJECTION, SOLUTION INTRAMUSCULAR; INTRAVENOUS at 13:44

## 2025-01-21 RX ADMIN — METOPROLOL TARTRATE 12.5 MG: 25 TABLET, FILM COATED ORAL at 20:40

## 2025-01-21 RX ADMIN — ATORVASTATIN CALCIUM 20 MG: 20 TABLET, FILM COATED ORAL at 08:52

## 2025-01-21 RX ADMIN — VANCOMYCIN 1250 MG: 1.75 INJECTION, SOLUTION INTRAVENOUS at 20:45

## 2025-01-21 RX ADMIN — INSULIN HUMAN 22 UNITS: 100 INJECTION, SUSPENSION SUBCUTANEOUS at 01:28

## 2025-01-21 RX ADMIN — SODIUM CHLORIDE, PRESERVATIVE FREE 10 ML: 5 INJECTION INTRAVENOUS at 08:54

## 2025-01-21 RX ADMIN — POTASSIUM CHLORIDE 40 MEQ: 750 TABLET, EXTENDED RELEASE ORAL at 10:24

## 2025-01-21 RX ADMIN — ENOXAPARIN SODIUM 40 MG: 100 INJECTION SUBCUTANEOUS at 08:53

## 2025-01-21 RX ADMIN — GUAIFENESIN 600 MG: 600 TABLET ORAL at 08:53

## 2025-01-21 ASSESSMENT — PAIN SCALES - GENERAL
PAINLEVEL_OUTOF10: 0

## 2025-01-21 NOTE — CARE COORDINATION
Transition of Care Plan:    RUR: 14%  Prior Level of Functioning: independent   Disposition: home with home health - pending referrals   CHLOE: 1/22  If SNF or IPR: Date FOC offered: patient declined   Follow up appointments: PCP, speciality   DME needed: RW - sent to [employer]  Transportation at discharge: family   IM/IMM Medicare/ letter given: to be provided   Caregiver Contact:  Ed Sterling [phone]  Discharge Caregiver contacted prior to discharge? To be contacted   Care Conference needed? Not at this time  Barriers to discharge: medical stability         CM spoke with patient/ regarding dcp. Both declining IPR and SNF placement, prefers to dc home with home health services. Voiced no preference in HH agency. Referrals sent to [facility], waiting for response. Referral for RW sent to [facility] and pending. MD Vaughan notified via perfect serve of pt/ decision on HH rather than rehab.         SANDI De, CM  [id number]

## 2025-01-22 LAB
ANION GAP SERPL CALC-SCNC: 6 MMOL/L (ref 2–12)
BACTERIA SPEC CULT: NORMAL
BASOPHILS # BLD: 0 K/UL (ref 0–0.1)
BASOPHILS NFR BLD: 0 % (ref 0–1)
BUN SERPL-MCNC: 25 MG/DL (ref 6–20)
BUN/CREAT SERPL: 36 (ref 12–20)
CALCIUM SERPL-MCNC: 9.6 MG/DL (ref 8.5–10.1)
CHLORIDE SERPL-SCNC: 108 MMOL/L (ref 97–108)
CO2 SERPL-SCNC: 24 MMOL/L (ref 21–32)
CREAT SERPL-MCNC: 0.7 MG/DL (ref 0.55–1.02)
DIFFERENTIAL METHOD BLD: ABNORMAL
EOSINOPHIL # BLD: 0.33 K/UL (ref 0–0.4)
EOSINOPHIL NFR BLD: 2 % (ref 0–7)
ERYTHROCYTE [DISTWIDTH] IN BLOOD BY AUTOMATED COUNT: 13.7 % (ref 11.5–14.5)
GLUCOSE BLD STRIP.AUTO-MCNC: 159 MG/DL (ref 65–117)
GLUCOSE BLD STRIP.AUTO-MCNC: 201 MG/DL (ref 65–117)
GLUCOSE BLD STRIP.AUTO-MCNC: 239 MG/DL (ref 65–117)
GLUCOSE BLD STRIP.AUTO-MCNC: 243 MG/DL (ref 65–117)
GLUCOSE BLD STRIP.AUTO-MCNC: 277 MG/DL (ref 65–117)
GLUCOSE SERPL-MCNC: 213 MG/DL (ref 65–100)
GRAM STN SPEC: NORMAL
HCT VFR BLD AUTO: 32.1 % (ref 35–47)
HGB BLD-MCNC: 10.7 G/DL (ref 11.5–16)
IMM GRANULOCYTES # BLD AUTO: 0 K/UL (ref 0–0.04)
IMM GRANULOCYTES NFR BLD AUTO: 0 % (ref 0–0.5)
LYMPHOCYTES # BLD: 1.84 K/UL (ref 0.8–3.5)
LYMPHOCYTES NFR BLD: 11 % (ref 12–49)
MAGNESIUM SERPL-MCNC: 1.9 MG/DL (ref 1.6–2.4)
MCH RBC QN AUTO: 30.4 PG (ref 26–34)
MCHC RBC AUTO-ENTMCNC: 33.3 G/DL (ref 30–36.5)
MCV RBC AUTO: 91.2 FL (ref 80–99)
MONOCYTES # BLD: 1.67 K/UL (ref 0–1)
MONOCYTES NFR BLD: 10 % (ref 5–13)
MYELOCYTES NFR BLD MANUAL: 2 %
NEUTS SEG # BLD: 12.53 K/UL (ref 1.8–8)
NEUTS SEG NFR BLD: 75 % (ref 32–75)
NRBC # BLD: 0 K/UL (ref 0–0.01)
NRBC BLD-RTO: 0 PER 100 WBC
PLATELET # BLD AUTO: 260 K/UL (ref 150–400)
PMV BLD AUTO: 10.8 FL (ref 8.9–12.9)
POTASSIUM SERPL-SCNC: 3.8 MMOL/L (ref 3.5–5.1)
RBC # BLD AUTO: 3.52 M/UL (ref 3.8–5.2)
RBC MORPH BLD: ABNORMAL
SERVICE CMNT-IMP: ABNORMAL
SERVICE CMNT-IMP: NORMAL
SODIUM SERPL-SCNC: 138 MMOL/L (ref 136–145)
WBC # BLD AUTO: 16.7 K/UL (ref 3.6–11)

## 2025-01-22 PROCEDURE — 80048 BASIC METABOLIC PNL TOTAL CA: CPT

## 2025-01-22 PROCEDURE — 6370000000 HC RX 637 (ALT 250 FOR IP): Performed by: NURSE PRACTITIONER

## 2025-01-22 PROCEDURE — 6370000000 HC RX 637 (ALT 250 FOR IP)

## 2025-01-22 PROCEDURE — 83735 ASSAY OF MAGNESIUM: CPT

## 2025-01-22 PROCEDURE — 85025 COMPLETE CBC W/AUTO DIFF WBC: CPT

## 2025-01-22 PROCEDURE — 36415 COLL VENOUS BLD VENIPUNCTURE: CPT

## 2025-01-22 PROCEDURE — 97535 SELF CARE MNGMENT TRAINING: CPT

## 2025-01-22 PROCEDURE — 6370000000 HC RX 637 (ALT 250 FOR IP): Performed by: STUDENT IN AN ORGANIZED HEALTH CARE EDUCATION/TRAINING PROGRAM

## 2025-01-22 PROCEDURE — 6370000000 HC RX 637 (ALT 250 FOR IP): Performed by: INTERNAL MEDICINE

## 2025-01-22 PROCEDURE — 6360000002 HC RX W HCPCS: Performed by: NURSE PRACTITIONER

## 2025-01-22 PROCEDURE — 82962 GLUCOSE BLOOD TEST: CPT

## 2025-01-22 PROCEDURE — 2500000003 HC RX 250 WO HCPCS: Performed by: INTERNAL MEDICINE

## 2025-01-22 PROCEDURE — 6370000000 HC RX 637 (ALT 250 FOR IP): Performed by: HOSPITALIST

## 2025-01-22 PROCEDURE — 2060000000 HC ICU INTERMEDIATE R&B

## 2025-01-22 RX ORDER — INSULIN HUMAN 100 [IU]/ML
INJECTION, SUSPENSION SUBCUTANEOUS
Qty: 5 ADJUSTABLE DOSE PRE-FILLED PEN SYRINGE | Refills: 0 | Status: CANCELLED | OUTPATIENT
Start: 2025-01-22 | End: 2025-02-06

## 2025-01-22 RX ADMIN — INSULIN LISPRO 2 UNITS: 100 INJECTION, SOLUTION INTRAVENOUS; SUBCUTANEOUS at 20:57

## 2025-01-22 RX ADMIN — LOSARTAN POTASSIUM 100 MG: 100 TABLET, FILM COATED ORAL at 09:44

## 2025-01-22 RX ADMIN — GUAIFENESIN 600 MG: 600 TABLET ORAL at 20:57

## 2025-01-22 RX ADMIN — INSULIN LISPRO 2 UNITS: 100 INJECTION, SOLUTION INTRAVENOUS; SUBCUTANEOUS at 11:44

## 2025-01-22 RX ADMIN — INSULIN LISPRO 2 UNITS: 100 INJECTION, SOLUTION INTRAVENOUS; SUBCUTANEOUS at 09:46

## 2025-01-22 RX ADMIN — INSULIN HUMAN 14 UNITS: 100 INJECTION, SUSPENSION SUBCUTANEOUS at 09:45

## 2025-01-22 RX ADMIN — NIFEDIPINE 60 MG: 30 TABLET, FILM COATED, EXTENDED RELEASE ORAL at 09:44

## 2025-01-22 RX ADMIN — SODIUM CHLORIDE, PRESERVATIVE FREE 10 ML: 5 INJECTION INTRAVENOUS at 09:47

## 2025-01-22 RX ADMIN — INSULIN LISPRO 4 UNITS: 100 INJECTION, SOLUTION INTRAVENOUS; SUBCUTANEOUS at 16:27

## 2025-01-22 RX ADMIN — ATORVASTATIN CALCIUM 20 MG: 20 TABLET, FILM COATED ORAL at 09:45

## 2025-01-22 RX ADMIN — ENOXAPARIN SODIUM 40 MG: 100 INJECTION SUBCUTANEOUS at 09:43

## 2025-01-22 RX ADMIN — SODIUM CHLORIDE, PRESERVATIVE FREE 10 ML: 5 INJECTION INTRAVENOUS at 20:59

## 2025-01-22 RX ADMIN — INSULIN GLARGINE 22 UNITS: 100 INJECTION, SOLUTION SUBCUTANEOUS at 09:48

## 2025-01-22 RX ADMIN — METOPROLOL TARTRATE 12.5 MG: 25 TABLET, FILM COATED ORAL at 09:46

## 2025-01-22 RX ADMIN — PREDNISONE 40 MG: 20 TABLET ORAL at 09:44

## 2025-01-22 RX ADMIN — INSULIN LISPRO 2 UNITS: 100 INJECTION, SOLUTION INTRAVENOUS; SUBCUTANEOUS at 16:27

## 2025-01-22 RX ADMIN — ASPIRIN 81 MG: 81 TABLET, COATED ORAL at 09:44

## 2025-01-22 RX ADMIN — METOPROLOL TARTRATE 12.5 MG: 25 TABLET, FILM COATED ORAL at 20:55

## 2025-01-22 ASSESSMENT — PAIN SCALES - GENERAL
PAINLEVEL_OUTOF10: 0

## 2025-01-22 NOTE — CARE COORDINATION
Chart reviewed for referrals sent.   [facility] at [phone], has accepted patient for home health. Confirmation of acceptance sent by CM.  [facility] accepted and notes indicate that they have delivered the rolling walker to the patient.    CM to follow up.    Erum Weiss, RN  Care Manager  [id number]

## 2025-01-22 NOTE — CARE COORDINATION
Transition of Care Plan:     RUR: 12%  Prior Level of Functioning: independent   Disposition: SNF - referrals pending   CHLOE: 1/23  If SNF or IPR: Date FOC offered: patient declined   Follow up appointments: PCP, speciality   DME needed: RW - sent to [employer]  Transportation at discharge: family   IM/IMM Medicare/ letter given: to be provided   Caregiver Contact:  Ed Sterling [phone]  Discharge Caregiver contacted prior to discharge? To be contacted   Care Conference needed? Not at this time  Barriers to discharge: medical stability       CM informed by MD that patient  is requesting to speak with CM again regarding dcp. CM spoke with  who is requesting patient now go to rehab prior to returning home.  requesting referral be sent to [facility] and [facility]. CM informed  [facility] does not have swing beds for rehab but can send referral to [facility] in [place].  agreeable. Referrals sent to [facility] and [facility].  does not want referrals sent to any other facilities. CM stressed the need for a back up plan if these two facilities decline.  to discuss with pt/daughter.       SANDI De, CM  [id number]

## 2025-01-23 VITALS
BODY MASS INDEX: 31.68 KG/M2 | RESPIRATION RATE: 19 BRPM | SYSTOLIC BLOOD PRESSURE: 109 MMHG | HEIGHT: 62 IN | DIASTOLIC BLOOD PRESSURE: 40 MMHG | WEIGHT: 172.18 LBS | HEART RATE: 68 BPM | TEMPERATURE: 98.2 F | OXYGEN SATURATION: 98 %

## 2025-01-23 LAB
GLUCOSE BLD STRIP.AUTO-MCNC: 181 MG/DL (ref 65–117)
GLUCOSE BLD STRIP.AUTO-MCNC: 206 MG/DL (ref 65–117)
SERVICE CMNT-IMP: ABNORMAL
SERVICE CMNT-IMP: ABNORMAL

## 2025-01-23 PROCEDURE — 6370000000 HC RX 637 (ALT 250 FOR IP): Performed by: HOSPITALIST

## 2025-01-23 PROCEDURE — 6370000000 HC RX 637 (ALT 250 FOR IP): Performed by: INTERNAL MEDICINE

## 2025-01-23 PROCEDURE — 6370000000 HC RX 637 (ALT 250 FOR IP): Performed by: STUDENT IN AN ORGANIZED HEALTH CARE EDUCATION/TRAINING PROGRAM

## 2025-01-23 PROCEDURE — 97116 GAIT TRAINING THERAPY: CPT

## 2025-01-23 PROCEDURE — 2500000003 HC RX 250 WO HCPCS: Performed by: INTERNAL MEDICINE

## 2025-01-23 PROCEDURE — 82962 GLUCOSE BLOOD TEST: CPT

## 2025-01-23 PROCEDURE — 6370000000 HC RX 637 (ALT 250 FOR IP)

## 2025-01-23 PROCEDURE — 6360000002 HC RX W HCPCS: Performed by: NURSE PRACTITIONER

## 2025-01-23 PROCEDURE — 6370000000 HC RX 637 (ALT 250 FOR IP): Performed by: NURSE PRACTITIONER

## 2025-01-23 PROCEDURE — 97530 THERAPEUTIC ACTIVITIES: CPT

## 2025-01-23 RX ORDER — NIFEDIPINE 30 MG/1
30 TABLET, EXTENDED RELEASE ORAL DAILY
Qty: 30 TABLET | Refills: 1 | Status: ON HOLD | OUTPATIENT
Start: 2025-01-24

## 2025-01-23 RX ORDER — PREDNISONE 20 MG/1
40 TABLET ORAL DAILY
Qty: 6 TABLET | Refills: 0 | Status: ON HOLD | OUTPATIENT
Start: 2025-01-24 | End: 2025-01-27

## 2025-01-23 RX ORDER — METOPROLOL TARTRATE 25 MG/1
12.5 TABLET, FILM COATED ORAL 2 TIMES DAILY
Qty: 60 TABLET | Refills: 1 | Status: ON HOLD | OUTPATIENT
Start: 2025-01-23

## 2025-01-23 RX ADMIN — Medication 1 AMPULE: at 09:23

## 2025-01-23 RX ADMIN — ASPIRIN 81 MG: 81 TABLET, COATED ORAL at 09:21

## 2025-01-23 RX ADMIN — ENOXAPARIN SODIUM 40 MG: 100 INJECTION SUBCUTANEOUS at 09:21

## 2025-01-23 RX ADMIN — LOSARTAN POTASSIUM 100 MG: 100 TABLET, FILM COATED ORAL at 09:22

## 2025-01-23 RX ADMIN — GUAIFENESIN 600 MG: 600 TABLET ORAL at 09:21

## 2025-01-23 RX ADMIN — INSULIN HUMAN 14 UNITS: 100 INJECTION, SUSPENSION SUBCUTANEOUS at 09:20

## 2025-01-23 RX ADMIN — INSULIN LISPRO 2 UNITS: 100 INJECTION, SOLUTION INTRAVENOUS; SUBCUTANEOUS at 13:12

## 2025-01-23 RX ADMIN — INSULIN LISPRO 2 UNITS: 100 INJECTION, SOLUTION INTRAVENOUS; SUBCUTANEOUS at 09:20

## 2025-01-23 RX ADMIN — INSULIN LISPRO 2 UNITS: 100 INJECTION, SOLUTION INTRAVENOUS; SUBCUTANEOUS at 09:23

## 2025-01-23 RX ADMIN — NIFEDIPINE 60 MG: 30 TABLET, FILM COATED, EXTENDED RELEASE ORAL at 09:21

## 2025-01-23 RX ADMIN — INSULIN GLARGINE 22 UNITS: 100 INJECTION, SOLUTION SUBCUTANEOUS at 09:18

## 2025-01-23 RX ADMIN — METOPROLOL TARTRATE 12.5 MG: 25 TABLET, FILM COATED ORAL at 09:21

## 2025-01-23 RX ADMIN — ATORVASTATIN CALCIUM 20 MG: 20 TABLET, FILM COATED ORAL at 09:22

## 2025-01-23 RX ADMIN — SODIUM CHLORIDE, PRESERVATIVE FREE 10 ML: 5 INJECTION INTRAVENOUS at 09:23

## 2025-01-23 RX ADMIN — PREDNISONE 40 MG: 20 TABLET ORAL at 09:21

## 2025-01-23 RX ADMIN — INSULIN LISPRO 2 UNITS: 100 INJECTION, SOLUTION INTRAVENOUS; SUBCUTANEOUS at 13:13

## 2025-01-23 ASSESSMENT — PAIN SCALES - GENERAL
PAINLEVEL_OUTOF10: 0

## 2025-01-23 NOTE — PROGRESS NOTES
Hospitalist Progress Note    NAME:   Katey Sterling   : 1940   MRN: [id number]     Date/Time: 2025 4:39 PM  Patient PCP: Catalino Gaines MD    Estimated discharge date: -  Barriers: Clinical improvement, final ID recs/clearance, placement eventually      Assessment / Plan:    Acute encephalopathy, suspecting infectious CNS etiology. Currently on empiric coverage with Vanc, CTX, acyclovir, ampicillin. Much improved from admission in ICU  Severe sepsis without shock. 2/2 CNS etiology   History of left cerebellopontine angle/retrosigmoid meningioma   LP performed 1/15/25 and about 12 ml of pick colored CSF drained (uniformly pink colored in 4 vials collected)  CSF analysis showed               Initial pressure : about 18               RBC: 18K              WBC 92              Prot 80              Gram stain and cult pending               Meningitis/ encephaliatis panel negative   MRI vernon WO contrast showed no acute findings and no significant change in left cerebellopontine angle/retrosigmoid meningioma   Rapid EEG been negative for seizure   Plan  Continue empiric anti microbials and will follow ID & neuro recs for further evaluation and management -stopped acyclovir  Swallow eval, ok to feed and PT/OT recs [facility]  ID note symptoms started abruptly after sedation with cath, could possibly DC ABX Monday     Acute resp failure with hypoxemia, on 2L nasal canula  No significant disease on CT chest, negative for PE  Plan  Manage sepsis as above and wean oxygen      ADRIANNA, improved with fluids   Plan  Monitor and document daily urine out put with accurate Is and Os  Please document daily weight.   Avoid nephrotoxic agents.     Hypernatremia  Resolved   Encourage PO     Uncontrolled HTN  Sinus tachycardia  Plan  Metoprolol 12.5 mg po bid      Uncontrolled hyperglycemia, diabetic   Plan   SSI  Wean steroids-decreased dex to 8 mg IV q12 hrs, continue to wean  Consult inpatient diabetes team for 
      Hospitalist Progress Note    NAME:   Katey Sterling   : 1940   MRN: [id number]     Date/Time: 2025 2:53 PM  Patient PCP: Catalino Gaines MD    Estimated discharge date: -  Barriers: Clinical improvement, placement eventually      Assessment / Plan:    Acute encephalopathy, suspecting infectious CNS etiology. Currently on empiric coverage with Vanc, CTX, acyclovir, ampicillin. Much improved from admission in ICU  Severe sepsis without shock. 2/2 CNS etiology   History of left cerebellopontine angle/retrosigmoid meningioma   LP performed 1/15/25 and about 12 ml of pick colored CSF drained (uniformly pink colored in 4 vials collected)  CSF analysis showed               Initial pressure : about 18               RBC: 18K              WBC 92              Prot 80              Gram stain and cult pending               Meningitis/ encephaliatis panel negative   MRI vernon WO contrast showed no acute findings and no significant change in left cerebellopontine angle/retrosigmoid meningioma   Rapid EEG been negative for seizure   Plan  Continue empiric anti microbials and will follow ID & neuro recs for further evaluation and management -stopped acyclovir  Swallow eval, ok to feed and PT/OT recs [facility]  ID note symptoms started abruptly after sedation with cath, could possibly DC ABX Monday     Acute resp failure with hypoxemia, on 2L nasal canula  No significant disease on CT chest, negative for PE  Plan  Manage sepsis as above and wean oxygen      ADRIANNA, improved with fluids   Plan  Monitor and document daily urine out put with accurate Is and Os  Please document daily weight.   Avoid nephrotoxic agents.     Hypernatremia  Resolved   Encourage PO     Uncontrolled HTN  Plan  Resume home anti hypertensive as tolerated      Uncontrolled hyperglycemia, diabetic   Plan   SSI  Wean steroids-decrease dex tomorrow  Consult inpatient diabetes team for management on floor     CAD  S/p cath   Mild to 
      Hospitalist Progress Note    NAME:   Katey Sterling   : 1940   MRN: [id number]     Date/Time: 2025 2:54 PM  Patient PCP: Catalino Gaines MD    Estimated discharge date:   Barriers: Placement to rehab      Assessment / Plan:    Acute encephalopathy, suspecting infectious CNS etiology. Currently on empiric coverage with Vanc, CTX, acyclovir, ampicillin. Much improved from admission in ICU  Severe sepsis without shock. 2/2 CNS etiology   History of left cerebellopontine angle/retrosigmoid meningioma   LP performed 1/15/25 and about 12 ml of pick colored CSF drained (uniformly pink colored in 4 vials collected)  CSF analysis showed               Initial pressure : about 18               RBC: 18K              WBC 92              Prot 80              Gram stain and cult pending               Meningitis/ encephaliatis panel negative   MRI vernon WO contrast showed no acute findings and no significant change in left cerebellopontine angle/retrosigmoid meningioma   Rapid EEG been negative for seizure   Plan  Continue empiric anti microbials and will follow ID & neuro recs for further evaluation and management -stopped acyclovir  Swallow eval, ok to feed and PT/OT recs [facility]  ID plans last day of vancomycin : Completing vancomycin today.  ID recommendations noted.  Wean steroids.  Changed to prednisone.     Acute resp failure with hypoxemia, on 2L nasal canula  No significant disease on CT chest, negative for PE  Plan  Manage sepsis as above and wean oxygen   : She is currently on room air     ADRIANNA, improved with fluids   Plan  Monitor and document daily urine out put with accurate Is and Os  Please document daily weight.   Avoid nephrotoxic agents.  : Creatinine is improved     Hypernatremia  Resolved   Encourage PO     Uncontrolled HTN  Sinus tachycardia  Plan  Metoprolol 12.5 mg po bid      Uncontrolled hyperglycemia, diabetic   Plan   SSI  Wean steroids-decreased dex to 8 mg IV 
      Hospitalist Progress Note    NAME:   Katey Sterling   : 1940   MRN: [id number]     Date/Time: 2025 4:26 PM  Patient PCP: Catalino Gaines MD    Estimated discharge date: -  Barriers: Clinical improvement, placement eventually      Assessment / Plan:    Acute encephalopathy, suspecting infectious CNS etiology. Currently on empiric coverage with Vanc, CTX, acyclovir, ampicillin. Much improved from admission in ICU  Severe sepsis without shock. 2/2 CNS etiology   History of left cerebellopontine angle/retrosigmoid meningioma   LP performed 1/15/25 and about 12 ml of pick colored CSF drained (uniformly pink colored in 4 vials collected)  CSF analysis showed               Initial pressure : about 18               RBC: 18K              WBC 92              Prot 80              Gram stain and cult pending               Meningitis/ encephaliatis panel negative   MRI vernon WO contrast showed no acute findings and no significant change in left cerebellopontine angle/retrosigmoid meningioma   Rapid EEG been negative for seizure   Plan  Continue empiric anti microbials and will follow ID & neuro recs for further evaluation and management   Swallow eval, ok to feed and PT/OT  ID note symptoms started abruptly after sedation with cath, could possibly DC ABX Monday     Acute resp failure with hypoxemia, on 2L nasal canula  No significant disease on CT chest, negative for PE  Plan  Manage sepsis as above and wean oxygen      ADRIANNA, improved with fluids   Plan  Monitor and document daily urine out put with accurate Is and Os  Please document daily weight.   Avoid nephrotoxic agents.     Hypernatremia  Plan  Start quarter saline at 150 ml per hour for next 24 hours and reassess for needs tomorrow   Encourage PO     Uncontrolled HTN  Plan  Resume home anti hypertensive as tolerated      Uncontrolled hyperglycemia, diabetic   Plan   SSI  Wean steroids-decrease dex tomorrow  Consult inpatient diabetes team 
      Hospitalist Progress Note    NAME:   Katey Sterling   : 1940   MRN: [id number]     Date/Time: 2025 12:26 PM  Patient PCP: Catalino Gaines MD    Estimated discharge date: 48 hrs  Barriers: Clinical improvement      Assessment / Plan:    Acute encephalopathy, suspecting infectious CNS etiology. Currently on empiric coverage with Vanc, CTX, acyclovir, ampicillin. Much improved from admission in ICU  Severe sepsis without shock. 2/2 CNS etiology   History of left cerebellopontine angle/retrosigmoid meningioma   - LP performed 1/15/25 and about 12 ml of pick colored CSF drained (uniformly pink colored in 4 vials collected)  - CSF analysis showed               Initial pressure : about 18               RBC: 18K              WBC 92              Prot 80              Gram stain and cult pending               Meningitis/ encephaliatis panel negative   - MRI vernon WO contrast showed no acute findings and no significant change in left cerebellopontine angle/retrosigmoid meningioma   - Rapid EEG been negative for seizure   Plan  - Continue empiric anti microbials and will follow ID & neuro recs for further evaluation and management   - Swallow eval, ok to feed and PT/OT     Acute resp failure with hypoxemia, on 2L nasal canula  - No significant disease on CT chest, negative for PE  Plan  - manage sepsis as above and wean oxygen      ADRIANNA, improved with fluids   Plan  - Monitor and document daily urine out put with accurate Is and Os  - Please document daily weight.   - Avoid nephrotoxic agents.     Hypernatremia  Plan  Start quarter saline at 150 ml per hour for next 24 hours and reassess for needs tomorrow   Encourage PO     Uncontrolled HTN  Plan  Resume home anti hypertensive as tolerated      Uncontrolled hyperglycemia, diabetic   Plan  On Insulin drip  Adjust insulin for goal 140-180 mf per dl  Wean steroids  Consult inpatient diabetes team for management on floor     CAD  S/p cath   Mild to 
      Hospitalist Progress Note    NAME:   Katey Sterling   : 1940   MRN: [id number]     Date/Time: 2025 2:30 PM  Patient PCP: Catalino Gaines MD    Estimated discharge date:   Barriers:       Assessment / Plan:    Acute encephalopathy, suspecting infectious CNS etiology. Currently on empiric coverage with Vanc, CTX, acyclovir, ampicillin. Much improved from admission in ICU  Severe sepsis without shock. 2/2 CNS etiology   History of left cerebellopontine angle/retrosigmoid meningioma   LP performed 1/15/25 and about 12 ml of pick colored CSF drained (uniformly pink colored in 4 vials collected)  CSF analysis showed               Initial pressure : about 18               RBC: 18K              WBC 92              Prot 80              Gram stain and cult pending               Meningitis/ encephaliatis panel negative   MRI vernon WO contrast showed no acute findings and no significant change in left cerebellopontine angle/retrosigmoid meningioma   Rapid EEG been negative for seizure   Plan  Continue empiric anti microbials and will follow ID & neuro recs for further evaluation and management -stopped acyclovir  Swallow eval, ok to feed and PT/OT recs [facility]  ID plans last day of vancomycin : Completing vancomycin today.  ID recommendations noted.  Wean steroids.  Changed to prednisone.  : Status post completion of antibiotics.  No further fever.  Wean prednisone.  Family now wants to go to rehab.  Notified case management.     Acute resp failure with hypoxemia, on 2L nasal canula  No significant disease on CT chest, negative for PE  Plan  Manage sepsis as above and wean oxygen   : She is currently on room air     ADRIANNA, improved with fluids   Plan  Monitor and document daily urine out put with accurate Is and Os  Please document daily weight.   Avoid nephrotoxic agents.  : Creatinine is improved     Hypernatremia  Resolved   Encourage PO     Uncontrolled HTN  Sinus 
 Hospital follow-up PCP transitional care appointment has been scheduled with Dr. Catalino Gaines on 2/4/25 at 1050. This is the first available appt due to limited provider availability. PCP office does not offer alternate provider option for hospital follow up. [facility] placed [facility] information AVS for patient resource. Pending patient discharge. Marilyn Rodriguez, Care Management Assistant   
01/14/2025 @ 2100 Patient with order for noctunal NIV. However, pt has had AMS all shift and an episode of vomiting, so will not start tonight. Discussed with Shanda Short who was in agreement  
0700 - received report from night time RN.   0800 - patient assessment completed. She wakes to voice but has very delayed and hard to understand speech. Once awake she does have a weak hand grasp but does not follow commands in the LE's. Patient immediately goes back to sleep after interactions. She is currently on 2LNC. Lung sounds are deminished. Sinus on the monitor. Pulses are all palpable with +1 generalized nonpitting edema through out. Abd is soft and nontender. Bowel sounds are active. Received with x3 PIV's. Insulin and LR running, see MAR for titrations.   0930 - IDR's held. Plan to give KPHOS and do a LP at some point during the day.   1200 - reassessment completed. No changes noted.   1400 - Dr. Perrin at the bedside. LP performed. Opening pressure 17. Vitals taken per protocol.   1600 - reassessment completed. Patient becoming more alert. Still with very slurred speech and some delay.   1730 - patient bladder scanned with 650 ml. Per Dr. Perrin place a carlisle for retention. 650 ml drained.   1900 - report given to ARAVIND Howe.     
0700 Bedside and Verbal shift change report given to Nona RN (oncoming nurse) by Lissa RN (offgoing nurse). Report included the following information Nurse Handoff Report, Index, Adult Overview, Surgery Report, Intake/Output, MAR, and Cardiac Rhythm a fib .      0740 Shift assessment - see flowsheets    0945 Fantasma DE LA TORRE with cardiology at bedside with patient    1125 Reassessment completed - see flowsheets     1500 PT/OT at bedside. Per team, patient ambulated the [facility] hallway and tolerated activity well on room air     1514 Reassessment completed - see flowsheets    1900 Bedside and Verbal shift change report given to Heather RN (oncoming nurse) by Nona RN (offgoing nurse). Report included the following information Nurse Handoff Report, Index, Adult Overview, Surgery Report, Intake/Output, MAR, Recent Results, and Cardiac Rhythm NSR with PAC - a fib .    
0930 Pt assisted to chair with 1 person contact guard assist only. Pt moving very well    1000 Spoke with Dr. Briones regarding pt mobility and discharge plan. Will have PT re-eval for possible D/C home.    Shift Summary: Pt up in chair for meals today and ambulated in gannon x 2. Pt ambulated to bathroom x 2 and voided and had BM each time. Pt A&O x 3. Pt's  stated that pt had home CPAP which he thought might help with her mental status. CPAP brought to bedside and [employer] called to inspect. Dr. Briones advised of situation and agrees that pt should be using CPAP at night.    
1835 Patient arrived to IVCU from Cath Lab. Right radial, TR band at 13cc, CDI, no bleeding/hematoma. Immobilizer in place. VSS, NSR, RA, Afebrile. Patient on bedrest.      1850: Patient very agitated and anxious, crawling out of bed, stating she needs to go to the bathroom, attempted to utilize purewick and bedside commode.     1903: Patient now slurring speech and combative, unable to follow commands or answer questions appropriately, code stroke called. Unable to complete an NIH. Dr. Meek aware.     Dr. Short and Rapid Response team to the bedside. Stat CTA Head without contrast, IV ativan given for CT.    2000: Dr. Newby pagelore aware of code stroke and situation, BP in the 200's. consult hospitalist for medical management   
1910 Bedside and Verbal shift change report given to Abe RNs (oncoming nurse) by Vibha RN (offgoing nurse). Report included the following information Nurse Handoff Report, Intake/Output, MAR, Recent Results, Med Rec Status, and Cardiac Rhythm NSR .      2000 Shift assessment complete, see flowsheet for details. Patient is alert and oriented to self and situation but not to time and place, appears confused and will get sidetracked from conversation. Patient is able to follow commands but is [place]. NSR on the monitor, pulses palpable, room air with clear and diminished lung sounds.     2202 Report called to [facility], placed on tele box 34 and transported on monitored bed to 2158. Transferred with all patient belongings included hearing aids,  present.   
Bedside and Verbal shift change report given to Vibha (oncoming nurse) by Shyam (offgoing nurse). Report included the following information Nurse Handoff Report, Index, Intake/Output, MAR, Recent Results, Med Rec Status, Cardiac Rhythm NSR, and Neuro Assessment.     0800 Head to toe assessment done. Patient is AO X 2-3 , follows commands. Respirations even and unlabored on NC. She is on insulin and LR drip.    0833 Patient passed swallow screen test.   Morning pills given mixed with applesauce.Tolerated well.    1027 Interdisciplinary round done. Plan to discontinue carlisle catheter, switch LR to 1/4 NS drip for sodium of 149, place the diet order and transfer.     1107 PRN Labetalol given for systolic >180 mm hg.    1114 Insulin Lantus given. Plan to continue insulin drip for 2 hours.    1200 Re assessment done. No changes noted.    1307 Insulin drip stopped.    1346 Taken off from NC. Tolerating well. Spo2 95%.    1406 Systolic . Dr. Perrin notified. Get an order for labetalol PIV.    1425 BP down to 155/75 after PIV Labetalol once.    1600  Re assessment done. /88. Dr. Art notified that she is not due for PRN Labetalol. Plan to add new po pill.    1732 PRN Labetalol given for BP in 190s.    Bedside and verbal report given to Mikayla/Leesa NAZARIO  
Cardiology Progress Note      1/14/2025 8:53 AM    Admit Date: 1/13/2025          Subjective: Events overnight noted. Remains confused . Fever overnight          BP (!) 178/86   Pulse (!) 117   Temp (!) 103.1 °F (39.5 °C) (Axillary)   Resp 22   Ht 1.575 m (5' 2\")   Wt 73.5 kg (162 lb)   SpO2 97%   BMI 29.63 kg/m²   No intake/output data recorded.        Objective:      Physical Exam:  VS as above   Chest coarse bilat bilat  Card RRR no gallop  Neuro- confused. No obvious focal motor abnormalities     Data Review:   Labs:    BUN 17  Creat 0.9  Hgb 14.1  WBC 14.9   Trop 21    Telemetry: ST R 110       Assessment:     S/p cardiac cath yest via RT radial  Post procedural encephalopathy , agitation. R/O CVA  Fever   No signif CAD by cath  Abnl nuclear stress , lateral ischemia nl EF   Htn  DM type 2   Dyslipidemia  Meningioma       Plan:  IV metoprolol for BP. Neuro eval and fever w/u. D/W family              
Cardiology Progress Note      1/15/2025 10:06 AM    Admit Date: 1/13/2025          Subjective:  Events noted. Recurrent fever last night with continued obtundation, lactic acid mildly elevated. MRI on brain negative for CVA          BP (!) 140/91   Pulse 99   Temp (!) 100.7 °F (38.2 °C) (Axillary)   Resp 24   Ht 1.575 m (5' 2\")   Wt 73.5 kg (162 lb)   SpO2 96%   BMI 29.63 kg/m²   01/13 1901 - 01/15 0700  In: 4655.1 [I.V.:1483.6]  Out: 506 [Urine:506]        Objective:      Physical Exam:  VS as above  Chest CTA  Card RRR no gallop   Neuro nonfocal, no changes     Data Review:   Labs:    BUN 31  Creat 1.0  Hgb 13.7  Lactic acid 2.9 yest     Telemetry: SR       Assessment:       S/p cardiac cath yest via RT radial  Post procedural encephalopathy , agitation. R/O CVA  Sepsis ? Source   No signif CAD by cath  Abnl nuclear stress , lateral ischemia nl EF   Htn  DM type 2   Dyslipidemia  Meningioma    Plan: Sepsis w/u in progress with LP later today Following. No new cardiac issues              
Cardiology Progress Note      1/16/2025 8:49 AM    Admit Date: 1/13/2025          Subjective:  Now awake and answering questions. No new cardiac issues          BP (!) 163/72   Pulse 80   Temp 98.6 °F (37 °C) (Axillary)   Resp 18   Ht 1.575 m (5' 2\")   Wt 73.5 kg (162 lb)   SpO2 97%   BMI 29.63 kg/m²   01/14 1901 - 01/16 0700  In: 8452.3 [I.V.:3880.8]  Out: 2881 [Urine:2881]        Objective:      Physical Exam:  VS as above  Chest CTA ant  Card RRR no gallop   Cath site ok    Data Review:   Labs:    BUN 29  Creat 0.55  Hgb 11.8  WBC 20.3     Telemetry: SR       Assessment:     S/p cardiac cath via RT radial  Post procedural encephalopathy , agitation- iproved. ? Infectious   Sepsis ? Source   No signif CAD by cath  Abnl nuclear stress , lateral ischemia nl EF   Htn  DM type 2   Dyslipidemia  Meningioma     Plan:  Cont current Rx. Nothing to add cardiac-wise              
Cardiology Progress Note      1/17/2025 8:50 AM    Admit Date: 1/13/2025          Subjective:  Continues to slowly improve. No new c/o          BP (!) 130/48   Pulse 65   Temp 98.2 °F (36.8 °C)   Resp 21   Ht 1.575 m (5' 2\")   Wt 76.4 kg (168 lb 6.9 oz)   SpO2 95%   BMI 30.81 kg/m²   01/15 1901 - 01/17 0700  In: 5475.3 [I.V.:3616.1]  Out: 3350 [Urine:3350]        Objective:      Physical Exam:  VS as above  Chest CTA  Card RRR no gallop   Cath site looks ok     Data Review:   Labs:      Creat 0.6  Hgb 12.6  WBC 20.6     Telemetry: SR       Assessment:     S/p cardiac cath via RT radial  Post procedural encephalopathy , agitation- improved. ? Infectious   Sepsis ? Source   No signif CAD by cath  Abnl nuclear stress , lateral ischemia nl EF   Htn  DM type 2   Dyslipidemia  Meningioma    Plan:   Cont current Rx. Increase activity as tolerated              
Cardiology Progress Note      1/18/2025 1:03 PM    Admit Date: 1/13/2025      Assessment:     S/p cardiac cath via RT radial on 1/13  Post procedural encephalopathy, agitation- improved. ? Infectious   Sepsis ? Source   No obstructive CAD by cath, mild-moderate mid-LAD disease  Abnl nuclear stress, lateral ischemia nl EF   Echo EF 65-70%  Htn  DM type 2   Dyslipidemia  Meningioma    Plan:   Continue current cardiac therapy with baby ASA, antihypertensives.  Based on the heart catheterization and echo, her cardiac risk for surgery is low.  I answered questions for the patient (hard of hearing) and family.   They worry about getting anesthesia in the future.  I don't think her transient altered mental status was due to a hyperthermic medication effect, nor seizure.  Cannot exclude image-negative stroke but this is unlikely.  Neuro does not feel this was an acute stroke.  Interestingly, she was febrile and toxic appearing but ESR was 2, not consistent with bacterial meningitis.  ID and neuro recs noted, appreciated.    She is in sinus rhythm with PAC's, low-grade tachycardia.  She has had PAC's in the past.  If BP remains elevated, could try low dose beta-blocker but I don't feel pressed to treat this asymptomatic and harmless finding.         Subjective:   No chest pain, palpitations, syncope, dizziness, worsening shortness of breath.         BP (!) 161/74   Pulse (!) 109   Temp 98 °F (36.7 °C) (Axillary)   Resp 20   Ht 1.575 m (5' 2\")   Wt 76.4 kg (168 lb 6.9 oz)   SpO2 97%   BMI 30.81 kg/m²   01/16 1901 - 01/18 0700  In: 680 [P.O.:680]  Out: 2950 [Urine:2950]        Objective:      Physical Exam:  VS as above  Chest CTA  Card RRR no gallop   Cath site looks ok     Data Review:   Labs:     Latest Reference Range & Units Most Recent   Sodium 136 - 145 mmol/L 138  1/18/25 03:04   Potassium 3.5 - 5.1 mmol/L 3.1 (L)  1/18/25 03:04   Chloride 97 - 108 mmol/L 107  1/18/25 03:04   CARBON DIOXIDE 21 - 32 mmol/L 
Cardiology Progress Note      1/19/2025 8:23 PM    Admit Date: 1/13/2025      Assessment:     S/p cardiac cath via RT radial on 1/13  Post procedural encephalopathy, agitation- improved. ? Infectious   Sepsis ? Source   No obstructive CAD by cath, mild-moderate mid-LAD disease  Abnl nuclear stress, lateral ischemia nl EF   Echo EF 65-70%  Htn  DM type 2   Dyslipidemia  Meningioma    Plan:   Continue current cardiac therapy with baby ASA, antihypertensives.  Based on the heart catheterization and echo, her cardiac risk for surgery is low.  I answered questions for the patient (hard of hearing) and family.   They worry about getting anesthesia in the future.  I don't think her transient altered mental status was due to a hyperthermic medication effect, nor seizure.  Cannot exclude image-negative stroke but this is unlikely.  Neuro does not feel this was an acute stroke.  Interestingly, she was febrile and toxic appearing but ESR was 2, not consistent with bacterial meningitis.  ID and neuro recs noted, appreciated.    She is in sinus rhythm with PAC's, low-grade tachycardia.  She has had PAC's in the past.  If BP remains elevated, could try low dose beta-blocker but I don't feel pressed to treat this asymptomatic and harmless finding.      1/19 update:  Dr. Meek back tomorrow.         Subjective:   No chest pain, palpitations, syncope, dizziness, worsening shortness of breath.         BP (!) 164/92   Pulse (!) 107   Temp 98.4 °F (36.9 °C) (Oral)   Resp 19   Ht 1.575 m (5' 2\")   Wt 77.2 kg (170 lb 3.1 oz)   SpO2 94%   BMI 31.13 kg/m²   01/18 0701 - 01/19 1900  In: 1839.3 [P.O.:1300]  Out: 1100 [Urine:1100]        Objective:      Physical Exam:  VS as above  Chest CTA  Card RRR no gallop   Cath site looks ok     Data Review:     LAB Review:     No results for input(s): \"CPK\", \"CKMB\" in the last 72 hours.    Invalid input(s): \"CPKMB\", \"CKNDX\", \"TROIQ\"  Lab Results   Component Value Date/Time    CHOL 146 
Cardiology Progress Note      1/20/2025 8:47 AM    Admit Date: 1/13/2025          Subjective:  Course over weekend noted. No new c/o          BP (!) 155/73   Pulse 85   Temp 97.1 °F (36.2 °C) (Oral)   Resp 19   Ht 1.575 m (5' 2\")   Wt 76.8 kg (169 lb 5 oz)   SpO2 95%   BMI 30.97 kg/m²   01/18 1901 - 01/20 0700  In: 2410.5 [P.O.:1620]  Out: 1900 [Urine:1900]        Objective:      Physical Exam:  VS as above  Chest CTA  Card RRR no gallop     Data Review:   Labs:    BUN 21  Creat 0.5  Hgb 12.9     Telemetry: SR       Assessment:       S/p cardiac cath via RT radial on 1/13  Post procedural encephalopathy, agitation- improved. ? Infectious   Sepsis ? Source   No obstructive CAD by cath, mild-moderate mid-LAD disease  Abnl nuclear stress, lateral ischemia nl EF   Echo EF 65-70%  Htn  DM type 2   Dyslipidemia  Meningioma    Plan:  Increase act. Looks close to d/c              
Cardiology Progress Note      1/21/2025 8:46 AM    Admit Date: 1/13/2025          Subjective: Still sleeping a lot but remains alert and responsive to questions. All CNS Cx negative          BP (!) 158/48   Pulse 86   Temp 97.5 °F (36.4 °C) (Oral)   Resp 20   Ht 1.575 m (5' 2\")   Wt 77.5 kg (170 lb 13.7 oz)   SpO2 95%   BMI 31.25 kg/m²   01/19 1901 - 01/21 0700  In: 1321.8 [P.O.:820]  Out: 1900 [Urine:1900]        Objective:      Physical Exam:  VS as above   Chest CTA  Card RRR no changes     Data Review:   Labs:      BUN 25  Creat 0.6  Hgb 11.0  WBC 19.2     Telemetry: SR       Assessment:     S/p cardiac cath via RT radial on 1/13  Post procedural encephalopathy, agitation- improved. ? Infectious   Sepsis ? Source   No obstructive CAD by cath, mild-moderate mid-LAD disease  Abnl nuclear stress, lateral ischemia nl EF   Echo EF 65-70%  Htn  DM type 2   Dyslipidemia  Meningioma     Plan:  Cont current Rx. To complete IV Abx. Agree with short term rehab placement              
Cardiology Progress Note      1/22/2025 9:56 AM    Admit Date: 1/13/2025          Subjective:  Ambulating. D/c plans noted          BP (!) 155/55   Pulse (!) 105   Temp 98.3 °F (36.8 °C) (Oral)   Resp 16   Ht 1.575 m (5' 2\")   Wt 78.1 kg (172 lb 2.9 oz)   SpO2 97%   BMI 31.49 kg/m²   01/20 1901 - 01/22 0700  In: 2296.5 [P.O.:1620]  Out: 1600 [Urine:1600]        Objective:      Physical Exam:  VS as above     Data Review:   Labs:    Hgb 10.7  Creat 0.7     Telemetry: SR       Assessment:          S/p cardiac cath via RT radial on 1/13  Post procedural encephalopathy, agitation- improved. ? Infectious   Sepsis ? Source   No obstructive CAD by cath, mild-moderate mid-LAD disease  Abnl nuclear stress, lateral ischemia nl EF   Echo EF 65-70%  Htn  DM type 2   Dyslipidemia  Meningioma       Plan:  Agreee with d/c plans. Cont medical Rx and treatment of cardiac risk factors. Can see me back in 6-8 weeks Home health arrangements noted              
Comprehensive Nutrition Assessment    Type and Reason for Visit:  LOS    Nutrition Recommendations/Plan:   Continue current diet  Monitor and record PO intakes and Bms in I/Os     Malnutrition Assessment:  Malnutrition Status:  No malnutrition (01/22/25 1431)    Context:  Acute Illness     Findings of the 6 clinical characteristics of malnutrition:  Energy Intake:  No decrease in energy intake  Weight Loss:  No weight loss     Body Fat Loss:  Unable to assess     Muscle Mass Loss:  Unable to assess    Fluid Accumulation:  No fluid accumulation     Strength:  Not Performed    Nutrition Assessment:    Admitted for encephalopathy. PMHx includes basal cell cancer, DM, diverticulosis, hypercholesterolemia, HTN. Screened for LOS. Pt with good intakes, no recent significant weight loss. Plan to continue diet.    Nutrition Related Findings:    Labs: Na 138, K 3.8, BUN 25, Creat 0.7, Gluc 239, Mag 1.9. Meds: atorvastatin, insulin glargine, insulin lispro, prednisone. No edema. BM 1/21. Wound Type: Surgical Incision       Current Nutrition Intake & Therapies:    Average Meal Intake: %  Average Supplements Intake: None Ordered  ADULT DIET; Dysphagia - Soft and Bite Sized; 3 carb choices (45 gm/meal)    Anthropometric Measures:  Height: 157.5 cm (5' 2.01\")  Ideal Body Weight (IBW): 110 lbs (50 kg)    Current Body Weight: 78.1 kg (172 lb 2.9 oz), 156.5 % IBW. Weight Source: Bed scale  Current BMI (kg/m2): 31.5  BMI Categories: Obese Class 1 (BMI 30.0-34.9)    Estimated Daily Nutrient Needs:  Energy Requirements Based On: Kcal/kg  Weight Used for Energy Requirements: Adjusted  Energy (kcal/day): 1425kcal (25kcal/kg)  Weight Used for Protein Requirements: Adjusted  Protein (g/day): 57g (1g/kg)  Method Used for Fluid Requirements: 1 ml/kcal  Fluid (ml/day): 1425mL    Nutrition Diagnosis:   No nutrition diagnosis at this time     Nutrition Interventions:   Food and/or Nutrient Delivery: Continue Current Diet  Nutrition 
Critical care interdisciplinary rounds today.  Following members present: Case Management, , Clinical Lead, Diabetes Team, Nursing, Nutrition, Pharmacy, and Physician. Family invited to participate.  Plan of care discussed.  See clinical pathway for plan of care and interventions and desired outcomes.     Cuello for retention overnight to be discontinued.  
End of Shift Note    Bedside shift change report given to ARAVIND Alcaraz (oncoming nurse) by Alea Panchal RN (offgoing nurse).  Report included the following information SBAR, Kardex, Intake/Output, MAR, and Recent Results    Shift worked:  7a-7p     Shift summary and any significant changes:    BM x2 today, pt completed all treatments as ordered. Hypertensive in afternoon. See Schneiders note.    Concerns for physician to address:    Zone phone for oncoming shift:       Activity:  Level of Assistance: Moderate assist, patient does 50-74%  Number times ambulated in hallways past shift: 0  Number of times OOB to chair past shift: 0    Cardiac:   Cardiac Monitoring: Yes      Cardiac Rhythm: Sinus rhythm, Sinus tachy    Access:  Current line(s): PIV     Genitourinary:   Urinary Status: External catheter    Respiratory:   O2 Device: None (Room air)  Chronic home O2 use?: NO  Incentive spirometer at bedside: NO    GI:  Last BM (including prior to admit): 01/17/25  Current diet:  ADULT DIET; Dysphagia - Soft and Bite Sized; 3 carb choices (45 gm/meal)  Passing flatus: YES    Pain Management:   Patient states pain is manageable on current regimen: YES    Skin:  Carlton Scale Score: 17  Interventions: Wound Offloading (Prevention Methods): Elevate heels    Patient Safety:  Fall Risk: Nursing Judgement-Fall Risk High(Add Comments): Yes  Fall Risk Interventions  Nursing Judgement-Fall Risk High(Add Comments): Yes  Toilet Every 2 Hours-In Advance of Need: Yes  Hourly Visual Checks: In bed, Eyes closed  Fall Visual Posted: Armband, Fall sign posted, Socks  Room Door Open: Yes  Alarm On: Bed  Patient Moved Closer to Nursing Station: No    Active Consults:   IP CONSULT TO HOSPITALIST  IP CONSULT TO PHARMACY  IP CONSULT TO HOSPITALIST  IP CONSULT TO INFECTIOUS DISEASES  IP CONSULT TO NEUROLOGY  IP CONSULT TO DIABETES MANAGEMENT    Length of Stay:  Expected LOS: 7  Actual LOS: 3    Alea Panchal RN                            
End of Shift Note    Bedside shift change report given to Katina NAZARIO (oncoming nurse) by Zo Francisco RN (offgoing nurse).  Report included the following information SBAR and Kardex    Shift worked:  4183-5064     Shift summary and any significant changes:    See below   Concerns for physician to address: Mentation has improved, Q2 neuro checks still needed?  Pt reports that she has not had adequate sleep in 2 days due to being woken up Q 2.  Orders are in for Q 6 blood sugars, but her insulin orders are for AC/HS.  Still need Q 6 blood sugars?     Zone phone for oncoming shift:          Activity:  Level of Assistance: Maximum assist, patient does 25-49%  Number times ambulated in hallways past shift: 0  Number of times OOB to chair past shift: 0    Cardiac:   Cardiac Monitoring: Yes      Cardiac Rhythm: Sinus rhythm    Access:  Current line(s): PIV     Genitourinary:   Urinary Status: External catheter    Respiratory:   O2 Device: None (Room air)  Chronic home O2 use?: NO  Incentive spirometer at bedside: YES    GI:  Last BM (including prior to admit): 01/16/25  Current diet:  ADULT DIET; Dysphagia - Soft and Bite Sized; 3 carb choices (45 gm/meal)  Passing flatus: YES    Pain Management:   Patient states pain is manageable on current regimen: YES    Skin:  Carlton Scale Score: 14  Interventions: Wound Offloading (Prevention Methods): Bed, pressure redistribution/air    Patient Safety:  Fall Risk: Nursing Judgement-Fall Risk High(Add Comments): Yes  Fall Risk Interventions  Nursing Judgement-Fall Risk High(Add Comments): Yes  Toilet Every 2 Hours-In Advance of Need: Yes  Hourly Visual Checks: Awake, In bed  Fall Visual Posted: Fall sign posted  Room Door Open: Yes  Alarm On: Bed  Patient Moved Closer to Nursing Station: No    Active Consults:   IP CONSULT TO HOSPITALIST  IP CONSULT TO PHARMACY  IP CONSULT TO HOSPITALIST  IP CONSULT TO INFECTIOUS DISEASES  IP CONSULT TO NEUROLOGY  IP CONSULT TO DIABETES 
End of Shift Note    Bedside shift change report given to RN (oncoming nurse) by Alea Panchal RN (offgoing nurse).  Report included the following information SBAR, Kardex, ED Summary, Procedure Summary, Intake/Output, and MAR    Shift worked:  7a-7p     Shift summary and any significant changes:    BM x2 today, patient is tolerating treatments well.   Concerns for physician to address:    Zone for oncoming shift:         Activity:  Level of Assistance: Moderate assist, patient does 50-74%  Number times ambulated in hallways past shift: 0  Number of times OOB to chair past shift: 0    Cardiac:   Cardiac Monitoring: Yes      Cardiac Rhythm: Sinus rhythm, Sinus tachy    Access:  Current line(s): PIV     Genitourinary:   Urinary Status: External catheter    Respiratory:   O2 Device: None (Room air)  Chronic home O2 use?: NO  Incentive spirometer at bedside: YES    GI:  Last BM (including prior to admit): 01/18/25  Current diet:  ADULT DIET; Dysphagia - Soft and Bite Sized; 3 carb choices (45 gm/meal)  Passing flatus: YES    Pain Management:   Patient states pain is manageable on current regimen: YES    Skin:  Carlton Scale Score: 17  Interventions: Wound Offloading (Prevention Methods): Elevate heels    Patient Safety:  Fall Risk: Nursing Judgement-Fall Risk High(Add Comments): Yes  Fall Risk Interventions  Nursing Judgement-Fall Risk High(Add Comments): Yes  Toilet Every 2 Hours-In Advance of Need: Yes  Hourly Visual Checks: In bed, Eyes closed  Fall Visual Posted: Armband, Fall sign posted, Socks  Room Door Open: Yes  Alarm On: Bed  Patient Moved Closer to Nursing Station: No    Active Consults:   IP CONSULT TO HOSPITALIST  IP CONSULT TO PHARMACY  IP CONSULT TO HOSPITALIST  IP CONSULT TO INFECTIOUS DISEASES  IP CONSULT TO NEUROLOGY  IP CONSULT TO DIABETES MANAGEMENT    Length of Stay:  Expected LOS: 7  Actual LOS: 4    Alea Panchal RN                            
End of Shift Note    Bedside shift change report given to Yoana NAZARIO  (oncoming nurse) by Yanni Izquierdo RN (offgoing nurse).  Report included the following information SBAR    Shift worked:  [id number]     Shift summary and any significant changes:     Pt remains unresponsive throughout shift. Pt pulls away from painful stimuli. NSR to Sinus Tach throughout shift. Afebrile. CTA abdomen pelvis and chest completed. Brain MRI completed. UA and D dimer completed. ABG completed. Neuro consult placed per cardiology. Echo ordered per hosptialist. Cardene drip initiated at 1428 for blood pressure management.      Concerns for physician to address:  N/A     Zone phone for oncoming shift:   N/A       Activity:     Number times ambulated in hallways past shift: 0  Number of times OOB to chair past shift: 0    Cardiac:   Cardiac Monitoring: Yes      Cardiac Rhythm: Sinus tachy    Access:  Current line(s): PIV     Genitourinary:   Urinary Status: Voiding, Incontinent briefs, Incontinent    Respiratory:   O2 Device: Nasal cannula  Chronic home O2 use?: NO  Incentive spirometer at bedside: NO    GI:  Last BM (including prior to admit): 01/12/25  Current diet:  ADULT DIET; Regular; 4 carb choices (60 gm/meal); Low Fat/Low Chol/High Fiber/2 gm Na  Passing flatus: YES    Pain Management:   Patient states pain is manageable on current regimen: N/A    Skin:  Carlton Scale Score: 10  Interventions: Wound Offloading (Prevention Methods): Repositioning, Pillows    Patient Safety:  Fall Risk: Nursing Judgement-Fall Risk High(Add Comments): Yes  Fall Risk Interventions  Nursing Judgement-Fall Risk High(Add Comments): Yes  Toilet Every 2 Hours-In Advance of Need: Yes  Hourly Visual Checks: Agitated, Confused, In bed, Eyes closed  Fall Visual Posted: Fall sign posted, Socks  Room Door Open: Yes  Alarm On: Bed  Patient Moved Closer to Nursing Station: No    Active Consults:   IP CONSULT TO HOSPITALIST  IP CONSULT TO PHARMACY  IP CONSULT TO 
Infectious Disease Progress        Impression    Postprocedural fever Tmax 103.1 on   S/p cardiac catheterization  Negative blood cultures, CSF workup  Traumatic LP noted  Meningitis panel ,CSF culture negative  On vancomycin, ceftriaxone x since   Previously also on ampicillin, acyclovir  ? Infection? Reaction to anesthesia    Leukocytosis  Likely steroid related  Procalcitonin 0.26, previously 0.16      Encephalopathy  Resolved    Meningioma  Plan is for surgery.    Acute hypoxic respiratory failure  Resolved      ADRIANNA  Resolved    Plan  No evidence of CNS infection, negative meningitis panel and CSF culture  Continue vancomycin x 7 days given postprocedural fever  Planned end date   Adequate fluids, daily probiotic.  D/w spouse at bedside    Will sign off  Please reconsult as needed.    Abx  Vancomycin--  Ceftriaxone -  S/p ampicillin -1/15  S/p acyclovir  to       Extensive review of chart notes, labs, imaging, cultures done  Additionally review of done: Recent reports-Labs, cultures, imaging  D/w -hospitalist, RN    Patient seen today.  Spouse at bedside.  Awake, alert. Concerned about CPAP machine.    Past Medical History:   Diagnosis Date    Basal cell cancer     Forehead ,Nose,    CAD (coronary artery disease)     lipids    Cancer (HCC)     Breast    Diabetes (HCC)     Diverticulosis     Hypercholesterolemia     Hypertension     Ill-defined condition     blood transfusion hx with both knee reolacement--autologous    Nausea & vomiting     Obesity     Sleep apnea     C PAP       Past Surgical History:   Procedure Laterality Date    BREAST BIOPSY      left    CARDIAC PROCEDURE N/A 2025    Left heart cath / coronary angiography performed by Bib Meek MD at [facility] CARDIAC CATH LAB    CATARACT REMOVAL      bilateral    GYN       O,ParaO    HEENT      scalp excision    HEENT      MOH's nose    INVASIVE VASCULAR N/A 2025    Ultrasound 
Infectious Disease Progress     Impression:   Sepsis; fever, leukocytosis, tachycardia  FUO  Leukocytosis; currently on steroid (dexamethasone 10 mg q 6 hour)  Acute metabolic encephalopathy  S/p LP (1/15)  ? CNS infection  - afebrile, wbc 20.3 (on steroid)    Blood cx (1/14) no growth so far    U/A (1/14) wbc 0-4    COVID/influenza (-)    CRP <0.29, sed rate 14, procal 0.26    Meningitis panel (-)    CSF cell count (1/15) wbc 92k, lymph 8, cx - pending, glucose 105, protein 84    HSV cx, [place] virus pending     Cerebellar meningioma    - was evaluated by neurosurgery team, planning for non urgent surgical intervention     Hypertension  Type II DM (A1C 7.3)    Plan:     Patient's mental status has significantly improved today.  Fever has resolved.  WBC remains elevated which is likely due to steroids.  Acute onset following cardiac catheterization is unlikely to be due to meningitis  Acyclovir may be stopped.   Would continue vancomycin and ceftriaxone over the weekend  If patient remains stable and CSF/blood blood cultures and serologies are unremarkable would consider stopping remaining antibiotics on Monday             History of Present Illness   Patient is a 84 y.o. female with medical history of hypertension, hyperlipidemia, type II DM, DJD with prior bilateral knee replacements, THANG (on CPAP at night), and meningioma who underwent for LHC on 1/13 who developed AMS with behavior changes post procedure. Cardiac cath revealed EF 40-50% with mid LAD stenosis     Post cardiac cath, t-max 103, wbc wbc 15.6 with 90.5% neutrophils, tachycardia, tachypnea, and hypertensive. Pt was admitted to ICU. CTA chest revealed no PE, no acute findings. CT of abd/pel Pancreatic head region cyst/cystic mass. Pt was started on empiric IV acyclovir, ampicillin, rocephin, and vancomycin with concerns of CNS infection. Pt was evaluated by neurology team.     Pt was recently diagnosed with cerebellar meningioma, was following up 
LHC, cor angio completed s complic  Rt radial   [place]    Findings    1- 40-50% mid LAD stenosis  2- no signif dz elsewhere  3- nl LVEDP    Recc    Medical Rx   D/C in am   
Nursing contacted Nocturnist/cross cover provider via non-urgent messaging system [employer] and notified patient T 102.1, appears recheck s/p tylenol is already going down and now 101.5, looks like hr documented 110s to low 120s, no rhythm changes reported. Pt had insulin 1815 for dinner, glucose now 387 mild inc from prior check, has ssi due. No other concerns reported. No acute distress reported. No other information provided by nurse. VSS.     Ordered ok nurse recheck glucose in an hour and give ssi per protocol if needed, and bld cx x1, cbc, bmp for breakthrough fever. Will defer further evaluation/management to the day shift primary attending care team. Patient denies any further complaints or concerns.     Nursing to notify Hospitalist for further/continued concerns. Will remain available overnight for further concerns if nursing/patient needs. Please note, there are RRT systems in this hospital in place that if nursing has acute or critical patient condition change or concern, this is to help facilitate and notify that patient needs immediate bedside evaluation by a provider.     Update 2128  Went to bedside as rapid nurse called to notify me after rounding for noted tachy 130s to confirm rate and states pt having appears GSC decreased of 6. On bedside exam pt is making occasional random movements but nothing meaningful not posturing, not verbally responding, not having any seizure muscle like obvious contraction activity. Remains tachy hr, bp stable on cardene, temp back to 98, having some mild labored breathing. No gag reflex noted, eyes not tracking, non-verbal, not making any grunting noises, etc. Not withdrawing from painful stimuli, eyes fixed gaze on initial exam without blink reflex reaction noted. GCS 6-7. Nurse tells me per her report pt had been like this all day and night, I seen pt overnight last night and am and pt was per my prior notes presentation documented- and this is an obvious change 
Nursing contacted Nocturnist/cross cover provider via non-urgent messaging system [employer] and notified patient temp 103.1, states was 98.5 2300, states room temp 80 with a lot of blankets were in place. States removed excess covers and decreased room temp already, asking for tylenol supp, states about to do the am labs now asking if additional orders. No other concerns reported. No acute distress reported. No other information provided by nurse. VSS, appears tachy 110s documented as well, remains hypertensive 178/86, now documented on 2lpm was on RA.     Ordered ns wt based bolus for supportive care, added labs to am labs ordered stat bld cx x2, crp, esr, pt, ptt, lactic, procalc, tsh w/ reflex -pending. Cxr remains pending rad read appears may have been placed during RRT as a routine- upon initial review concern for possible CAP- pending final rad review. Due to febrile 103.1 and tachycardia and appearing possible CAP on CXR upon initial read- will order empiric zosyn/vancomycin pharm to dose- defer to sofia wong for further change to regimen. Tylenol suppository prn. U/a neg acute infectious processes. Mucinex liq po prn, mucinex po 600mg bid scheduled start in the am, pulmonary toilet- turn cough deep breathe, ics, chest pt, sputum culture- pending. Covid/flu w/ isolation precautions until covid/flu results. Will defer further evaluation/management to the day shift primary attending care team. Patient denies any further complaints or concerns.     Nursing to notify Hospitalist for further/continued concerns. Will remain available overnight for further concerns if nursing/patient needs. Please note, there are RRT systems in this hospital in place that if nursing has acute or critical patient condition change or concern, this is to help facilitate and notify that patient needs immediate bedside evaluation by a provider.     Update  0655- appears labs and imaging remains pending as ordered above. Per nurse 
Nursing contacted Nocturnist/cross cover provider via non-urgent messaging system [employer] and notified patient bp 186/85 on po bp meds and s/p 3 doses labetalol today reported, on 5mg prn w/ parameters, states due be trs out of the 2510 to the floor- awaiting confirmation pt being trs out to SDU. No other concerns reported. No acute distress reported. No other information provided by nurse. VSS.     Ordered labetalol 10mg Iv x1. Will defer further evaluation/management to the day shift primary attending care team. Patient denies any further complaints or concerns.     Nursing to notify Hospitalist for further/continued concerns. Will remain available overnight for further concerns if nursing/patient needs. Please note, there are RRT systems in this hospital in place that if nursing has acute or critical patient condition change or concern, this is to help facilitate and notify that patient needs immediate bedside evaluation by a provider.     Non-billable note.       
Patient arrived to IVCU from Cath Lab. Right radial, TR band at 13 cc, CDI, no bleeding/hematoma. Immobilizer in place. VSS, NSR, Room air, patient denies pain, Afebrile. Patient on bedrest.       
Patient found in room at shift change to be unresponsive _ per dayshift RN this was not new and the patient was like that all day.     2030: messaged [place] NP regarding Pts BS  2046: told to just do sliding scale  RRT came to bedside and escalated concerns, labs and culture ordered.     [place] at bedside.  [place] NP at bedside. Patient transferred to ICU.        Bedside shift report given to ICU RN, SBAR, procedural report, results and above scenario.    
Pharmacy Antimicrobial Kinetic Dosing    Indication for Antimicrobials: CAP; CNS infection     Current Regimen of Each Antimicrobial:  Vancomycin Pharmacy to Dose; Start Date ; Day # 2  Ceftriaxone 2g IV q12H; Start ; Day # 2  Ampcillin 2g IV Q6H; Start ; Day # 2  Acyclovir 750 mg IV q12H; Start ; Day # 2    Previous Antimicrobial Therapy:  Zosyn 4500 mg IV x 1      Goal Level: Vancomycin -600    Date Dose & Interval Measured (mcg/mL) Predicted AUC 24-48 Predicted AUC 24,ss   1/15 @0401 750 mg IV q12H 18.1 549 651                   Significant Cultures:    Blood: pending  MRSA nares: to be collected  RVP: to be collected    Labs:  Recent Labs     Units 01/15/25  0401 01/15/25  0357 25  2216 25  2145 25  0548 25  2058   CREATININE MG/DL 1.00  --  1.29* 1.22* 0.86 0.72   BUN MG/DL 31*  --  33* 31* 17 14   PROCAL ng/mL  --   --  0.26  --  0.16  --    WBC K/uL  --  15.6*  --  18.4* 14.9* 9.2     Temp (24hrs), Av.9 °F (37.7 °C), Min:98.7 °F (37.1 °C), Max:102.1 °F (38.9 °C)      Conditions for Dosing Consideration: None    Creatinine Clearance (mL/min): Estimated Creatinine Clearance: 39 mL/min (based on SCr of 1 mg/dL).       Impression/Plan:   Vancomycin predicted to be supratherapeutic; adjust to Vancomycin 750 mg IV q18H  Ampicillin + ceftriaxone + acyclovir added for concern for CNS infection, GCS 6 on RR yesterday tmax 103.1; MRI negative  Predicted SHK01-04 = 440, Predicted AUC24,ss = 447  Antimicrobial stop date  for vancomycin; tbd for others     Pharmacy will follow daily and adjust medications as appropriate for renal function and/or serum levels.    Thank you,  Katheryn Alcaraz, [facility]    
Pharmacy Antimicrobial Kinetic Dosing    Indication for Antimicrobials: CAP; CNS infection     Current Regimen of Each Antimicrobial:  Vancomycin Pharmacy to Dose; Start Date ; Day # 4  Ceftriaxone 2g IV q12H; Start ; Day # 4  Acyclovir 750 mg IV q12H; Start ; Day # 4    Previous Antimicrobial Therapy:  Zosyn 4500 mg IV x 1    Ampcillin 2g IV Q6H; Start -    Goal Level: Vancomycin -600    Date Dose & Interval Measured (mcg/mL) Predicted AUC 24-48 Predicted AUC 24,ss   1/15 @0401 750 mg IV q12H 18.1 549 651    1250 mg q12h 12.5 402 411            Significant Cultures:    Blood: NGTD prelim  1/15 encephalitis panel: negative  1/15 CSF culture: no organisms seen, prelim   1/15 HSV: pend  1/15 [place]: pend  1/15 VDRL CSF: Pend    Labs:  Recent Labs     Units 25  0332 25  0354 01/15/25  0401 01/15/25  0357 25  2216 25  2145   CREATININE MG/DL 0.58 0.55 1.00  --  1.29* 1.22*   BUN MG/DL 23* 29* 31*  --  33* 31*   PROCAL ng/mL  --   --   --   --  0.26  --    WBC K/uL 20.6* 20.3*  --  15.6*  --  18.4*     Temp (24hrs), Av.3 °F (36.8 °C), Min:97.5 °F (36.4 °C), Max:99.2 °F (37.3 °C)      Conditions for Dosing Consideration: None    Creatinine Clearance (mL/min): Estimated Creatinine Clearance: 69 mL/min (based on SCr of 0.58 mg/dL).       Impression/Plan:   LP 1/15; ID following  Vancomycin level of 12.5 is borderline subtherapeutic. Will increase dose to vanc 1250 mg q12h for AUC of 511  Continue Ceftriaxone and acyclovir per ID  Antimicrobial stop date TBD     Pharmacy will follow daily and adjust medications as appropriate for renal function and/or serum levels.    Thank you,  Rudi Clarke, [facility]    
Pharmacy Antimicrobial Kinetic Dosing    Indication for Antimicrobials: CAP; CNS infection     Current Regimen of Each Antimicrobial:  Vancomycin Pharmacy to Dose; Start Date ; Day # 6  Ceftriaxone 2g IV q12H; Start ; Day # 6    Previous Antimicrobial Therapy:  Acyclovir -  Zosyn 4500 mg IV x 1    Ampcillin 2g IV Q6H; Start -    Goal Level: Vancomycin -600    Date Dose & Interval Measured (mcg/mL) Predicted AUC 24-48 Predicted AUC 24,ss   1/15 @0401 750 mg IV q12H 18.1 549 651    1000 mg q12h 12.5 402 411    1250 mg q12h 14.9 553 559     Significant Cultures:    Blood: NGTD prelim  1/15 encephalitis panel: negative  1/15 CSF culture: no organisms seen, prelim   1/15 HSV: pend  1/15 [place]: pend  1/15 VDRL CSF: Pend    Labs:  Recent Labs     Units 25  0204 25  0304 25  0332   CREATININE MG/DL 0.64 0.65 0.58   BUN MG/DL 21* 24* 23*   WBC K/uL 15.9* 16.8* 20.6*     Temp (24hrs), Av °F (36.7 °C), Min:97.4 °F (36.3 °C), Max:98.3 °F (36.8 °C)      Conditions for Dosing Consideration: None    Creatinine Clearance (mL/min): Estimated Creatinine Clearance: 63 mL/min (based on SCr of 0.64 mg/dL).       Impression/Plan:   LP 1/15; ID following  Vancomycin level of 14.9 is therapeutic. Continue vanc 1250 mg q12h  Continue ceftriaxone  Antimicrobial stop date TBD     Pharmacy will follow daily and adjust medications as appropriate for renal function and/or serum levels.    Thank you,  Rudi Clarke [facility]        
Physical Therapy Note      1216 Patient returning to bed after going to restroom with RN.   Patient in restroom working with OT.   
RAPID RESPONSE TEAM    Overhead rapid response paged to room # 2222/01  at 1909    Reason for rapid response: Code Stroke     Initial assessment:   Upon arrival patient is alert and trying to get out of the bed. Patient has slight slurring of speech and will not follow any commands at this time so unable to complete full neuro assessment.    STARR Short at bedside, orders received for the following Interventions:   -CT head w/o contrast  -0.5mg ativan one time now  -12 lead EKG  -Blood glucose check  -Update Cardiology  -Consult hospitalist  -ABG  -Chest xray    Outcome:   pt to remain in room # 2222/01         Please call with any questions or concerns    Azul Lassiter RN  Rapid Response Team  Ext [phone]     Recent Results (from the past 8 hour(s))   POCT Glucose    Collection Time: 01/13/25  2:20 PM   Result Value Ref Range    POC Glucose 149 (H) 65 - 117 mg/dL    Performed by: Sandy aLnd \"Danielle\" ARAVIND    Basic Metabolic Panel    Collection Time: 01/13/25  2:28 PM   Result Value Ref Range    Sodium 141 136 - 145 mmol/L    Potassium 4.1 3.5 - 5.1 mmol/L    Chloride 106 97 - 108 mmol/L    CO2 30 21 - 32 mmol/L    Anion Gap 5 2 - 12 mmol/L    Glucose 147 (H) 65 - 100 mg/dL    BUN 14 6 - 20 MG/DL    Creatinine 0.65 0.55 - 1.02 MG/DL    BUN/Creatinine Ratio 22 (H) 12 - 20      Est, Glom Filt Rate 87 >60 ml/min/1.73m2    Calcium 9.9 8.5 - 10.1 MG/DL   Cardiac procedure    Collection Time: 01/13/25  6:35 PM   Result Value Ref Range    Body Surface Area 1.79 m2   Blood Gas, Arterial    Collection Time: 01/13/25  8:29 PM   Result Value Ref Range    pH, Arterial 7.36 7.35 - 7.45      pCO2, Arterial 42 35 - 45 mmHg    pO2, Arterial 81 80 - 100 mmHg    POC O2 SAT 96 92 - 97 %    HCO3, Arterial 24 22 - 26 mmol/L    Base deficit, arterial blood 1.9 mmol/L    O2 Method NASAL CANNULA      Source ARTERIAL      Site LEFT RADIAL      Dimas Test YES     EKG 12 Lead    Collection Time: 01/13/25  8:48 PM   Result Value Ref Range 
Spiritual Health History and Assessment/Progress Note  [facility]    Attempted Encounter,  ,  ,      Name: Katey Sterling MRN: [id number]    Age: 84 y.o.     Sex: female   Language: English   Yazidism: Latter-day   Acute encephalopathy     Date: 1/15/2025            Total Time Calculated: 0 min              Spiritual Assessment began in [facility]        Referral/Consult From: Rounding   Encounter Overview/Reason: Attempted Encounter  Service Provided For: Patient not available    Kalyani, Belief, Meaning:   Patient unable to assess at this time  Family/Friends No family/friends present      Importance and Influence:  Patient unable to assess at this time  Family/Friends No family/friends present    Community:  Patient Other: Unable to assess  Family/Friends No family/friends present    Assessment and Plan of Care:     Patient Interventions include: Other: Unable to assess  Family/Friends Interventions include: No family/friends present    Patient Plan of Care: Spiritual Care available upon further referral  Family/Friends Plan of Care: Spiritual Care available upon further referral    Electronically signed by CLARICE Melendez on 1/15/2025 at 1:10 PM   Rev. TIBURCIO Mleendez, CLARICE  [facility]   Paging Service [phone])  
medical therapy and treatment of risk factors  Appreciate cardiology recommendations    Medical Decision Making:   I personally reviewed labs: CBC, BMP  I personally reviewed imaging:  I personally reviewed EKG: Tele with sinus tachycardia  Toxic drug monitoring: SSI, steroids, monitor for hyperglycemia  Discussed case with: 1/20-d/w ID, last day of abx 1/21. Can plan to DC post vanco here as pt looking for facility.        Code Status: FULL  DVT Prophylaxis: Lovenox  GI Prophylaxis:    Subjective:     Chief Complaint / Reason for Physician Visit  Doing well.  Alert and oriented x3.   [place].  Spouse present at bedside.  Discussed with nursing overnight events.    Objective:     VITALS:   Last 24hrs VS reviewed since prior progress note. Most recent are:  Patient Vitals for the past 24 hrs:   BP Temp Temp src Pulse Resp SpO2 Weight   01/20/25 1428 (!) 149/61 97.4 °F (36.3 °C) Oral 85 21 98 % --   01/20/25 1205 133/66 97.8 °F (36.6 °C) Oral 94 22 97 % --   01/20/25 0844 (!) 155/73 97.1 °F (36.2 °C) Oral 85 19 95 % --   01/20/25 0315 (!) 160/79 98.2 °F (36.8 °C) Oral 89 13 95 % 76.8 kg (169 lb 5 oz)   01/19/25 2225 (!) 159/78 98.4 °F (36.9 °C) Oral 70 23 -- --   01/19/25 1925 (!) 164/92 98.4 °F (36.9 °C) Oral (!) 107 19 94 % --         Intake/Output Summary (Last 24 hours) at 1/20/2025 1841  Last data filed at 1/20/2025 0844  Gross per 24 hour   Intake 571.26 ml   Output 1200 ml   Net -628.74 ml        I had a face to face encounter and independently examined this patient on 1/20/2025, as outlined below:  PHYSICAL EXAM:  General: Alert, cooperative  EENT:  EOMI. Anicteric sclerae.  Resp:  CTA bilaterally, no wheezing or rales.  No accessory muscle use  CV:  Regular  rhythm,  No edema  GI:  Soft, Non distended, Non tender.  +Bowel sounds  Neurologic:  Alert and oriented X 3, normal speech,   Psych:   Good insight. Not anxious nor agitated  Skin:  No rashes.  No jaundice    Reviewed most current lab test results and 
discussed the case and the plan and management of the patient's care with the consulting services, the bedside nurses and the respiratory therapist.      NOTE OF PERSONAL INVOLVEMENT IN CARE   This patient has a high probability of imminent, clinically significant deterioration, which requires the highest level of preparedness to intervene urgently. I participated in the decision-making and personally managed or directed the management of the following life and organ supporting interventions that required my frequent assessment to treat or prevent imminent deterioration.    Aydin Garcia MD   [facility]  [phone]  2025      Review of systems:     Review of Systems   Unable to obtain    Objective:     Vital Signs:  BP (!) 174/72   Pulse 80   Temp 98.6 °F (37 °C) (Axillary)   Resp 18   Ht 1.575 m (5' 2\")   Wt 73.5 kg (162 lb)   SpO2 97%   BMI 29.63 kg/m²      Temp (24hrs), Av.1 °F (37.3 °C), Min:97.9 °F (36.6 °C), Max:100.5 °F (38.1 °C)           Intake/Output:     Intake/Output Summary (Last 24 hours) at 2025 0719  Last data filed at 2025 0700  Gross per 24 hour   Intake 3797.14 ml   Output 2425 ml   Net 1372.14 ml       Physical Exam  Constitutional:       Appearance: She is ill-appearing. She is not toxic-appearing.   HENT:      Head: Normocephalic and atraumatic.      Mouth/Throat:      Mouth: Mucous membranes are dry.   Cardiovascular:      Rate and Rhythm: Tachycardia present.   Abdominal:      General: There is no distension.      Palpations: Abdomen is soft.   Musculoskeletal:      Right lower leg: No edema.      Left lower leg: No edema.   Neurological:      Mental Status: She is oriented to person, place, and time.      Comments: Slow in response            Past Medical History:      has a past medical history of Basal cell cancer, CAD (coronary artery disease), Cancer (HCC), Diabetes (HCC), Diverticulosis, Hypercholesterolemia, Hypertension, Ill-defined condition, 
  atorvastatin (LIPITOR) 20 MG tablet Take 1 tablet by mouth daily 1/15/24  Yes Catalino Gaines MD   metFORMIN (GLUCOPHAGE-XR) 500 MG extended release tablet TAKE 2 TABLETS BY MOUTH TWICE DAILY FOR DIABETES 12/9/24   Catalino Gaines MD   nitroGLYCERIN (NITROSTAT) 0.4 MG SL tablet Place 1 tablet under the tongue every 5 minutes as needed for Chest pain up to max of 3 total doses. If no relief after 1 dose, call 911. 10/28/24   Catalino Gaines MD   mometasone (ELOCON) 0.1 % cream Apply topically in thin coat twice daily as needed itching bites. 8/14/24   Catalino Gaines MD   Semaglutide,0.25 or 0.5MG/DOS, 2 MG/3ML SOPN Inject 0.5 mg into the skin once a week Indications: sugar and protect kidney, heart For sugar diabetes and weight 2/13/24   Catalino Gaines MD       Allergies/Social/Family History:     Allergies   Allergen Reactions    Ace Inhibitors      Other reaction(s): Unable to Obtain    Azithromycin Itching    Diphenhydramine Hives      Social History     Tobacco Use    Smoking status: Never    Smokeless tobacco: Never   Substance Use Topics    Alcohol use: No     Alcohol/week: 0.0 standard drinks of alcohol      Family History   Problem Relation Age of Onset    Heart Disease Father     Diabetes Mother     Cancer Brother         Colon       LABS AND  DATA:   Reviewed    Peak airway pressure:      Minute ventilation:          
fasciculations    Strength testing:  The patient does move all 4 extremities spontaneously.  She had difficult time with fully comprehending detailed motor testing but was at least a 4 out of 5 in her upper and lower extremities    Sensory:  Upper extremity: Withdrawal to painful stimulation bilaterally  Lower extremity: Withdrawal to painful stimulation bilaterally    Reflexes:    Right Left  Biceps  2 2  Triceps 2 2  Brachiorad. 2 2  Patella  2 2  Achilles 1 1    Plantar response:  flexor bilaterally    Cerebellar testing:  no tremor apparent      Labs:     Lab Results   Component Value Date/Time     01/16/2025 03:54 AM    K 3.6 01/16/2025 03:54 AM     01/16/2025 03:54 AM    BUN 29 01/16/2025 03:54 AM    WBC 20.3 01/16/2025 03:54 AM    HCT 35.7 01/16/2025 03:54 AM    HGB 11.8 01/16/2025 03:54 AM     01/16/2025 03:54 AM    LDL 34.6 08/14/2024 03:04 PM    LDL 23.8 07/10/2023 02:34 PM                      Principal Problem:    Acute encephalopathy  Active Problems:    Unresponsive episode    Seizure-like activity (HCC)    Metabolic encephalopathy    Fever    Leukocytosis    Nonspecific abnormal function study, cardiovascular  Resolved Problems:    * No resolved hospital problems. *      I spent   35  minutes providing care to this  acutely ill inpatient with > 50% of the time counseling and assisting in the coordination of care of the patient on the patient's hospital floor/unit.               Signed By:  Jacky Baker DO FAAN    January 16, 2025         
is followed by bolus injection of 100 mL Isovue 370 contrast IV, with thin section axial Chest CT obtained and 3D image post processing performed including coronal MIPS. CT dose reduction was achieved through use of a standardized protocol tailored for this examination and automatic exposure control for dose modulation. FINDINGS: This is a good quality study for the evaluation of pulmonary embolism to the first subsegmental arterial level. There is no pulmonary embolism to this level. There is no right heart strain. There is no apparent aortic dissection or aneurysm.  Lungs show no pneumonia. Lungs are expiratory with mild bibasilar atelectasis. There are 2 prominent calcified benign granulomas in the right lung. There is no pneumothorax. There is no pleural effusion. There is no significant adenopathy.     1. No pulmonary emboli. 2. No acute finding. Electronically signed by KAREN WEIR    XR CHEST PORTABLE    Result Date: 1/14/2025  EXAM:  XR CHEST PORTABLE INDICATION: Change in mentation COMPARISON: None TECHNIQUE: Portable AP semiupright chest view at 2025 hours FINDINGS: The cardiac silhouette is within normal limits. The pulmonary vasculature is within normal limits. Right lung calcified granuloma are noted. The lungs and pleural spaces are otherwise clear. There is no pneumothorax. The visualized bones and upper abdomen are age-appropriate.     No acute process. Electronically signed by Tomasz Cox    CT HEAD WO CONTRAST    Result Date: 1/13/2025  EXAM: CT CODE NEURO HEAD WO CONTRAST CLINICAL HISTORY: Altered mental status, weakness INDICATION: Altered mental status, weakness COMPARISON: 11/4/2024. CT dose reduction was achieved through use of a standardized protocol tailored for this examination and automatic exposure control for dose modulation. TECHNIQUE: Serial axial images with a collimation of 5 mm were obtained from the skull base through the vertex.  Serial axial images with a collimation of 5

## 2025-01-23 NOTE — PLAN OF CARE
Problem: Occupational Therapy - Adult  Goal: By Discharge: Performs self-care activities at highest level of function for planned discharge setting.  See evaluation for individualized goals.  Description: FUNCTIONAL STATUS PRIOR TO ADMISSION:  Patient was independent and active without DME.     Prior Level of Assist for ADLs: Independent, Prior Level of Assist for Homemaking: Independent,  , Prior Level of Assist for Transfers: Independent, Active : Yes     HOME SUPPORT: Patient lived with supportive spouse but did not require assistance.    Occupational Therapy Goals:  Initiated 1/17/2025  1.  Patient will perform  grooming routine with Set-Up/Supervision within 7 day(s).  2.  Patient will perform self-feeding with Set-Up/Supervision within 7 day(s).  3.  Patient will perform upper body dressing with Set-up/Supervision within 7 day(s).  4.  Patient will perform all aspects of toileting, including transfers, with Minimal Assist using RW prn within 7 day(s).  5.  Patient will perform lower-body dressing with Minimal Assist using RW prn within 7 day(s).  6.  Patient will participate in upper extremity therapeutic exercise/activities with Supervision for 7 minutes within 7 day(s).    7.  Patient will utilize energy conservation techniques during functional activities with verbal cues within 7 day(s).  Outcome: Progressing   OCCUPATIONAL THERAPY EVALUATION    Patient: Katey Sterling (84 y.o. female)  Date: 1/17/2025  Primary Diagnosis: Nonspecific abnormal function study, cardiovascular [R94.30]  Acute encephalopathy [G93.40]  Procedure(s) (LRB):  Left heart cath / coronary angiography (N/A)  Ultrasound guided vascular access (N/A) 4 Days Post-Op     Precautions:                    ASSESSMENT :  The patient is limited by decreased functional mobility, independence in ADLs, high-level IADLs, ROM, strength, activity tolerance, endurance, safety awareness, cognition, command following, attention/concentration, 
  Problem: Occupational Therapy - Adult  Goal: By Discharge: Performs self-care activities at highest level of function for planned discharge setting.  See evaluation for individualized goals.  Description: FUNCTIONAL STATUS PRIOR TO ADMISSION:  Patient was independent and active without DME.     Prior Level of Assist for ADLs: Independent, Prior Level of Assist for Homemaking: Independent,  , Prior Level of Assist for Transfers: Independent, Active : Yes     HOME SUPPORT: Patient lived with supportive spouse but did not require assistance.    Occupational Therapy Goals:  Initiated 1/17/2025  1.  Patient will perform  grooming routine with Set-Up/Supervision within 7 day(s).  2.  Patient will perform self-feeding with Set-Up/Supervision within 7 day(s).  3.  Patient will perform upper body dressing with Set-up/Supervision within 7 day(s).  4.  Patient will perform all aspects of toileting, including transfers, with Minimal Assist using RW prn within 7 day(s).  5.  Patient will perform lower-body dressing with Minimal Assist using RW prn within 7 day(s).  6.  Patient will participate in upper extremity therapeutic exercise/activities with Supervision for 7 minutes within 7 day(s).    7.  Patient will utilize energy conservation techniques during functional activities with verbal cues within 7 day(s).  Outcome: Progressing   OCCUPATIONAL THERAPY TREATMENT  Patient: Katey Sterling (84 y.o. female)  Date: 1/20/2025  Primary Diagnosis: Nonspecific abnormal function study, cardiovascular [R94.30]  Acute encephalopathy [G93.40]  Procedure(s) (LRB):  Left heart cath / coronary angiography (N/A)  Ultrasound guided vascular access (N/A) 7 Days Post-Op   Precautions:                  Chart, occupational therapy assessment, plan of care, and goals were reviewed.    ASSESSMENT  Patient continues to benefit from skilled OT services and is slowly progressing towards goals. Patient is received resting in bed, supportive 
  Problem: Occupational Therapy - Adult  Goal: By Discharge: Performs self-care activities at highest level of function for planned discharge setting.  See evaluation for individualized goals.  Description: FUNCTIONAL STATUS PRIOR TO ADMISSION:  Patient was independent and active without DME.     Prior Level of Assist for ADLs: Independent, Prior Level of Assist for Homemaking: Independent,  , Prior Level of Assist for Transfers: Independent, Active : Yes     HOME SUPPORT: Patient lived with supportive spouse but did not require assistance.    Occupational Therapy Goals:  Initiated 1/17/2025  1.  Patient will perform  grooming routine with Set-Up/Supervision within 7 day(s).  2.  Patient will perform self-feeding with Set-Up/Supervision within 7 day(s).  3.  Patient will perform upper body dressing with Set-up/Supervision within 7 day(s).  4.  Patient will perform all aspects of toileting, including transfers, with Minimal Assist using RW prn within 7 day(s).  5.  Patient will perform lower-body dressing with Minimal Assist using RW prn within 7 day(s).  6.  Patient will participate in upper extremity therapeutic exercise/activities with Supervision for 7 minutes within 7 day(s).    7.  Patient will utilize energy conservation techniques during functional activities with verbal cues within 7 day(s).  Outcome: Progressing   OCCUPATIONAL THERAPY TREATMENT  Patient: Katey Sterling (84 y.o. female)  Date: 1/22/2025  Primary Diagnosis: Nonspecific abnormal function study, cardiovascular [R94.30]  Acute encephalopathy [G93.40]  Procedure(s) (LRB):  Left heart cath / coronary angiography (N/A)  Ultrasound guided vascular access (N/A) 9 Days Post-Op   Precautions:                  Chart, occupational therapy assessment, plan of care, and goals were reviewed.    ASSESSMENT  Patient continues to benefit from skilled OT services and is progressing towards goals. Pt tolerated OT session well. Demonstrates improvement 
  Problem: Pain  Goal: Verbalizes/displays adequate comfort level or baseline comfort level  Outcome: Progressing     
  Problem: Physical Therapy - Adult  Goal: By Discharge: Performs mobility at highest level of function for planned discharge setting.  See evaluation for individualized goals.  Description: FUNCTIONAL STATUS PRIOR TO ADMISSION: Patient was independent and active without use of DME.    HOME SUPPORT PRIOR TO ADMISSION: The patient lived with spouse but did not require assistance.    Physical Therapy Goals  Initiated 1/17/2025  1.  Patient will move from supine to sit and sit to supine in bed with supervision/set-up within 7 day(s).    2.  Patient will perform sit to stand with supervision/set-up within 7 day(s).  3.  Patient will transfer from bed to chair and chair to bed with contact guard assist using the least restrictive device within 7 day(s).  4.  Patient will ambulate with contact guard assist for 100 feet with the least restrictive device within 7 day(s).   5.  Patient will ascend/descend 3 stairs with  handrail(s) with contact guard assist within 7 day(s).   1/17/2025 1417 by Laura Lawrence, PT  Outcome: Not Progressing     Problem: Physical Therapy - Adult  Goal: By Discharge: Performs mobility at highest level of function for planned discharge setting.  See evaluation for individualized goals.  Description: FUNCTIONAL STATUS PRIOR TO ADMISSION: Patient was independent and active without use of DME.    HOME SUPPORT PRIOR TO ADMISSION: The patient lived with spouse but did not require assistance.    Physical Therapy Goals  Initiated 1/17/2025  1.  Patient will move from supine to sit and sit to supine in bed with supervision/set-up within 7 day(s).    2.  Patient will perform sit to stand with supervision/set-up within 7 day(s).  3.  Patient will transfer from bed to chair and chair to bed with contact guard assist using the least restrictive device within 7 day(s).  4.  Patient will ambulate with contact guard assist for 100 feet with the least restrictive device within 7 day(s).   5.  Patient will 
  Problem: Physical Therapy - Adult  Goal: By Discharge: Performs mobility at highest level of function for planned discharge setting.  See evaluation for individualized goals.  Description: FUNCTIONAL STATUS PRIOR TO ADMISSION: Patient was independent and active without use of DME.    HOME SUPPORT PRIOR TO ADMISSION: The patient lived with spouse but did not require assistance.    Physical Therapy Goals  Initiated 1/17/2025  1.  Patient will move from supine to sit and sit to supine in bed with supervision/set-up within 7 day(s).    2.  Patient will perform sit to stand with supervision/set-up within 7 day(s).  3.  Patient will transfer from bed to chair and chair to bed with contact guard assist using the least restrictive device within 7 day(s).  4.  Patient will ambulate with contact guard assist for 100 feet with the least restrictive device within 7 day(s).   5.  Patient will ascend/descend 3 stairs with  handrail(s) with contact guard assist within 7 day(s).   Outcome: Not Progressing   PHYSICAL THERAPY EVALUATION    Patient: Katey Sterling (84 y.o. female)  Date: 1/17/2025  Primary Diagnosis: Nonspecific abnormal function study, cardiovascular [R94.30]  Acute encephalopathy [G93.40]  Procedure(s) (LRB):  Left heart cath / coronary angiography (N/A)  Ultrasound guided vascular access (N/A) 4 Days Post-Op   Precautions:                        ASSESSMENT :   DEFICITS/IMPAIRMENTS:   The patient is limited by decreased functional mobility, strength, activity tolerance, cognition, attention/concentration, balance.  Patient received in bed and agreeable to participate, spouse also present in room and able to assist with history.  Patient can follow simple commands, but had difficulty with some questions and processing was slow.  Able to come to sit EOB with CGA and extra time, can sit unsupported with  increased lateral sway.  Stood to RW and ambulated x approx 6 feet to chair, had a difficult time backing up 
  Problem: Safety - Adult  Goal: Free from fall injury  1/18/2025 1936 by Lissa Zaidi RN  Outcome: Progressing  1/18/2025 1156 by Alea Panchal RN  Outcome: Progressing     Problem: ABCDS Injury Assessment  Goal: Absence of physical injury  1/18/2025 1936 by Lissa Zaidi RN  Outcome: Progressing  1/18/2025 1156 by Alea Panchal RN  Outcome: Progressing     Problem: Chronic Conditions and Co-morbidities  Goal: Patient's chronic conditions and co-morbidity symptoms are monitored and maintained or improved  1/18/2025 1936 by Lissa Zaidi RN  Outcome: Progressing  1/18/2025 1156 by Alea Panchal RN  Outcome: Progressing     Problem: Discharge Planning  Goal: Discharge to home or other facility with appropriate resources  1/18/2025 1156 by Alea Panchal RN  Outcome: Progressing     Problem: Pain  Goal: Verbalizes/displays adequate comfort level or baseline comfort level  1/18/2025 1156 by Alea Panchal RN  Outcome: Progressing     Problem: Skin/Tissue Integrity  Goal: Absence of new skin breakdown  Description: 1.  Monitor for areas of redness and/or skin breakdown  2.  Assess vascular access sites hourly  3.  Every 4-6 hours minimum:  Change oxygen saturation probe site  4.  Every 4-6 hours:  If on nasal continuous positive airway pressure, respiratory therapy assess nares and determine need for appliance change or resting period.  1/18/2025 1156 by Alea Panchal RN  Outcome: Progressing     Problem: Confusion  Goal: Confusion, delirium, dementia, or psychosis is improved or at baseline  Description: INTERVENTIONS:  1. Assess for possible contributors to thought disturbance, including medications, impaired vision or hearing, underlying metabolic abnormalities, dehydration, psychiatric diagnoses, and notify attending LIP  2. [place] high risk fall precautions, as indicated  3. Provide frequent short contacts to provide reality reorientation, refocusing and direction  4. Decrease environmental 
  Problem: Safety - Adult  Goal: Free from fall injury  1/19/2025 0754 by Alea Panchal RN  Outcome: Progressing  1/18/2025 1936 by Lissa Zaidi RN  Outcome: Progressing     Problem: ABCDS Injury Assessment  Goal: Absence of physical injury  1/19/2025 0754 by Alea Panchal RN  Outcome: Progressing  1/18/2025 1936 by Lissa Zaidi RN  Outcome: Progressing     Problem: Chronic Conditions and Co-morbidities  Goal: Patient's chronic conditions and co-morbidity symptoms are monitored and maintained or improved  1/19/2025 0754 by Alea Panchal RN  Outcome: Progressing  1/18/2025 1936 by Lissa Zaidi RN  Outcome: Progressing     Problem: Discharge Planning  Goal: Discharge to home or other facility with appropriate resources  Outcome: Progressing     Problem: Pain  Goal: Verbalizes/displays adequate comfort level or baseline comfort level  Outcome: Progressing     Problem: Skin/Tissue Integrity  Goal: Absence of new skin breakdown  Description: 1.  Monitor for areas of redness and/or skin breakdown  2.  Assess vascular access sites hourly  3.  Every 4-6 hours minimum:  Change oxygen saturation probe site  4.  Every 4-6 hours:  If on nasal continuous positive airway pressure, respiratory therapy assess nares and determine need for appliance change or resting period.  Outcome: Progressing     Problem: Confusion  Goal: Confusion, delirium, dementia, or psychosis is improved or at baseline  Description: INTERVENTIONS:  1. Assess for possible contributors to thought disturbance, including medications, impaired vision or hearing, underlying metabolic abnormalities, dehydration, psychiatric diagnoses, and notify attending LIP  2. [place] high risk fall precautions, as indicated  3. Provide frequent short contacts to provide reality reorientation, refocusing and direction  4. Decrease environmental stimuli, including noise as appropriate  5. Monitor and intervene to maintain adequate nutrition, hydration, 
  Problem: Safety - Adult  Goal: Free from fall injury  1/19/2025 1940 by Lissa Zaidi RN  Outcome: Progressing  1/19/2025 0754 by Alea Panchal RN  Outcome: Progressing     Problem: ABCDS Injury Assessment  Goal: Absence of physical injury  1/19/2025 1940 by Lissa Zaidi RN  Outcome: Progressing  1/19/2025 0754 by Alea Panchal RN  Outcome: Progressing     Problem: Chronic Conditions and Co-morbidities  Goal: Patient's chronic conditions and co-morbidity symptoms are monitored and maintained or improved  1/19/2025 1940 by Lissa Zaidi RN  Outcome: Progressing  1/19/2025 0754 by Alea Panchal RN  Outcome: Progressing     Problem: Discharge Planning  Goal: Discharge to home or other facility with appropriate resources  1/19/2025 0754 by Alea Panchal RN  Outcome: Progressing     Problem: Pain  Goal: Verbalizes/displays adequate comfort level or baseline comfort level  1/19/2025 0754 by Alea Panchal RN  Outcome: Progressing     Problem: Skin/Tissue Integrity  Goal: Absence of new skin breakdown  Description: 1.  Monitor for areas of redness and/or skin breakdown  2.  Assess vascular access sites hourly  3.  Every 4-6 hours minimum:  Change oxygen saturation probe site  4.  Every 4-6 hours:  If on nasal continuous positive airway pressure, respiratory therapy assess nares and determine need for appliance change or resting period.  1/19/2025 0754 by Alea Panchal RN  Outcome: Progressing     Problem: Confusion  Goal: Confusion, delirium, dementia, or psychosis is improved or at baseline  Description: INTERVENTIONS:  1. Assess for possible contributors to thought disturbance, including medications, impaired vision or hearing, underlying metabolic abnormalities, dehydration, psychiatric diagnoses, and notify attending LIP  2. [place] high risk fall precautions, as indicated  3. Provide frequent short contacts to provide reality reorientation, refocusing and direction  4. Decrease environmental 
  Problem: Safety - Adult  Goal: Free from fall injury  1/21/2025 1954 by Lissa Zaidi RN  Outcome: Progressing  1/21/2025 1152 by Leonor Blake RN  Outcome: Progressing     Problem: ABCDS Injury Assessment  Goal: Absence of physical injury  1/21/2025 1954 by Lissa Zaidi RN  Outcome: Progressing  1/21/2025 1152 by Leonor Blake RN  Outcome: Progressing     Problem: Chronic Conditions and Co-morbidities  Goal: Patient's chronic conditions and co-morbidity symptoms are monitored and maintained or improved  1/21/2025 1954 by Lissa Zaidi RN  Outcome: Progressing  1/21/2025 1152 by Leonor Blake RN  Outcome: Progressing     Problem: Discharge Planning  Goal: Discharge to home or other facility with appropriate resources  1/21/2025 1152 by Leonor Blake RN  Outcome: Progressing     Problem: Pain  Goal: Verbalizes/displays adequate comfort level or baseline comfort level  1/21/2025 1152 by Leonor Blake RN  Outcome: Progressing     Problem: Skin/Tissue Integrity  Goal: Absence of new skin breakdown  Description: 1.  Monitor for areas of redness and/or skin breakdown  2.  Assess vascular access sites hourly  3.  Every 4-6 hours minimum:  Change oxygen saturation probe site  4.  Every 4-6 hours:  If on nasal continuous positive airway pressure, respiratory therapy assess nares and determine need for appliance change or resting period.  1/21/2025 1152 by Leonor Blake RN  Outcome: Progressing     Problem: Confusion  Goal: Confusion, delirium, dementia, or psychosis is improved or at baseline  Description: INTERVENTIONS:  1. Assess for possible contributors to thought disturbance, including medications, impaired vision or hearing, underlying metabolic abnormalities, dehydration, psychiatric diagnoses, and notify attending LIP  2. [place] high risk fall precautions, as indicated  3. Provide frequent short contacts to provide reality reorientation, refocusing and direction  4. 
  Problem: Safety - Adult  Goal: Free from fall injury  Outcome: Progressing     
  Problem: Safety - Adult  Goal: Free from fall injury  Outcome: Progressing     Problem: ABCDS Injury Assessment  Goal: Absence of physical injury  Outcome: Progressing     Problem: Chronic Conditions and Co-morbidities  Goal: Patient's chronic conditions and co-morbidity symptoms are monitored and maintained or improved  Outcome: Progressing     Problem: Discharge Planning  Goal: Discharge to home or other facility with appropriate resources  Outcome: Progressing     Problem: Pain  Goal: Verbalizes/displays adequate comfort level or baseline comfort level  Outcome: Progressing     Problem: Skin/Tissue Integrity  Goal: Absence of new skin breakdown  Description: 1.  Monitor for areas of redness and/or skin breakdown  2.  Assess vascular access sites hourly  3.  Every 4-6 hours minimum:  Change oxygen saturation probe site  4.  Every 4-6 hours:  If on nasal continuous positive airway pressure, respiratory therapy assess nares and determine need for appliance change or resting period.  Outcome: Progressing     
  Problem: Safety - Adult  Goal: Free from fall injury  Outcome: Progressing     Problem: ABCDS Injury Assessment  Goal: Absence of physical injury  Outcome: Progressing     Problem: Chronic Conditions and Co-morbidities  Goal: Patient's chronic conditions and co-morbidity symptoms are monitored and maintained or improved  Outcome: Progressing     Problem: Discharge Planning  Goal: Discharge to home or other facility with appropriate resources  Outcome: Progressing     Problem: Pain  Goal: Verbalizes/displays adequate comfort level or baseline comfort level  Outcome: Progressing     Problem: Skin/Tissue Integrity  Goal: Absence of new skin breakdown  Description: 1.  Monitor for areas of redness and/or skin breakdown  2.  Assess vascular access sites hourly  3.  Every 4-6 hours minimum:  Change oxygen saturation probe site  4.  Every 4-6 hours:  If on nasal continuous positive airway pressure, respiratory therapy assess nares and determine need for appliance change or resting period.  Outcome: Progressing     Problem: Confusion  Goal: Confusion, delirium, dementia, or psychosis is improved or at baseline  Description: INTERVENTIONS:  1. Assess for possible contributors to thought disturbance, including medications, impaired vision or hearing, underlying metabolic abnormalities, dehydration, psychiatric diagnoses, and notify attending LIP  2. [place] high risk fall precautions, as indicated  3. Provide frequent short contacts to provide reality reorientation, refocusing and direction  4. Decrease environmental stimuli, including noise as appropriate  5. Monitor and intervene to maintain adequate nutrition, hydration, elimination, sleep and activity  6. If unable to ensure safety without constant attention obtain sitter and review sitter guidelines with assigned personnel  7. Initiate Psychosocial CNS and Spiritual Care consult, as indicated  Outcome: Progressing     Problem: Respiratory - Adult  Goal: Achieves 
  Problem: Safety - Adult  Goal: Free from fall injury  Outcome: Progressing     Problem: ABCDS Injury Assessment  Goal: Absence of physical injury  Outcome: Progressing     Problem: Chronic Conditions and Co-morbidities  Goal: Patient's chronic conditions and co-morbidity symptoms are monitored and maintained or improved  Outcome: Progressing     Problem: Discharge Planning  Goal: Discharge to home or other facility with appropriate resources  Outcome: Progressing     Problem: Pain  Goal: Verbalizes/displays adequate comfort level or baseline comfort level  Outcome: Progressing     Problem: Skin/Tissue Integrity  Goal: Absence of new skin breakdown  Description: 1.  Monitor for areas of redness and/or skin breakdown  2.  Assess vascular access sites hourly  3.  Every 4-6 hours minimum:  Change oxygen saturation probe site  4.  Every 4-6 hours:  If on nasal continuous positive airway pressure, respiratory therapy assess nares and determine need for appliance change or resting period.  Outcome: Progressing     Problem: Confusion  Goal: Confusion, delirium, dementia, or psychosis is improved or at baseline  Description: INTERVENTIONS:  1. Assess for possible contributors to thought disturbance, including medications, impaired vision or hearing, underlying metabolic abnormalities, dehydration, psychiatric diagnoses, and notify attending LIP  2. [place] high risk fall precautions, as indicated  3. Provide frequent short contacts to provide reality reorientation, refocusing and direction  4. Decrease environmental stimuli, including noise as appropriate  5. Monitor and intervene to maintain adequate nutrition, hydration, elimination, sleep and activity  6. If unable to ensure safety without constant attention obtain sitter and review sitter guidelines with assigned personnel  7. Initiate Psychosocial CNS and Spiritual Care consult, as indicated  Outcome: Progressing     Problem: Respiratory - Adult  Goal: Achieves 
  Problem: Safety - Adult  Goal: Free from fall injury  Outcome: Progressing     Problem: ABCDS Injury Assessment  Goal: Absence of physical injury  Outcome: Progressing     Problem: Chronic Conditions and Co-morbidities  Goal: Patient's chronic conditions and co-morbidity symptoms are monitored and maintained or improved  Outcome: Progressing     Problem: Physical Therapy - Adult  Goal: By Discharge: Performs mobility at highest level of function for planned discharge setting.  See evaluation for individualized goals.  Description: FUNCTIONAL STATUS PRIOR TO ADMISSION: Patient was independent and active without use of DME.    HOME SUPPORT PRIOR TO ADMISSION: The patient lived with spouse but did not require assistance.    Physical Therapy Goals  Initiated 1/17/2025  1.  Patient will move from supine to sit and sit to supine in bed with supervision/set-up within 7 day(s).    2.  Patient will perform sit to stand with supervision/set-up within 7 day(s).  3.  Patient will transfer from bed to chair and chair to bed with contact guard assist using the least restrictive device within 7 day(s).  4.  Patient will ambulate with contact guard assist for 100 feet with the least restrictive device within 7 day(s).   5.  Patient will ascend/descend 3 stairs with  handrail(s) with contact guard assist within 7 day(s).   1/20/2025 1532 by Radha Borden, JULEE  Outcome: Progressing     Problem: Occupational Therapy - Adult  Goal: By Discharge: Performs self-care activities at highest level of function for planned discharge setting.  See evaluation for individualized goals.  Description: FUNCTIONAL STATUS PRIOR TO ADMISSION:  Patient was independent and active without DME.     Prior Level of Assist for ADLs: Independent, Prior Level of Assist for Homemaking: Independent,  , Prior Level of Assist for Transfers: Independent, Active : Yes     HOME SUPPORT: Patient lived with supportive spouse but did not require 
  Problem: Safety - Adult  Goal: Free from fall injury  Outcome: Progressing     Problem: ABCDS Injury Assessment  Goal: Absence of physical injury  Outcome: Progressing     Problem: Pain  Goal: Verbalizes/displays adequate comfort level or baseline comfort level  Outcome: Progressing     Problem: Skin/Tissue Integrity  Goal: Absence of new skin breakdown  Description: 1.  Monitor for areas of redness and/or skin breakdown  2.  Assess vascular access sites hourly  3.  Every 4-6 hours minimum:  Change oxygen saturation probe site  4.  Every 4-6 hours:  If on nasal continuous positive airway pressure, respiratory therapy assess nares and determine need for appliance change or resting period.  Outcome: Progressing     Problem: Confusion  Goal: Confusion, delirium, dementia, or psychosis is improved or at baseline  Description: INTERVENTIONS:  1. Assess for possible contributors to thought disturbance, including medications, impaired vision or hearing, underlying metabolic abnormalities, dehydration, psychiatric diagnoses, and notify attending LIP  2. [place] high risk fall precautions, as indicated  3. Provide frequent short contacts to provide reality reorientation, refocusing and direction  4. Decrease environmental stimuli, including noise as appropriate  5. Monitor and intervene to maintain adequate nutrition, hydration, elimination, sleep and activity  6. If unable to ensure safety without constant attention obtain sitter and review sitter guidelines with assigned personnel  7. Initiate Psychosocial CNS and Spiritual Care consult, as indicated  Outcome: Progressing     
  Problem: Safety - Adult  Goal: Free from fall injury  Outcome: Progressing     Problem: ABCDS Injury Assessment  Goal: Absence of physical injury  Outcome: Progressing     Problem: Pain  Goal: Verbalizes/displays adequate comfort level or baseline comfort level  Outcome: Progressing  Flowsheets (Taken 1/14/2025 2300)  Verbalizes/displays adequate comfort level or baseline comfort level:   Encourage patient to monitor pain and request assistance   Assess pain using appropriate pain scale   Administer analgesics based on type and severity of pain and evaluate response   Implement non-pharmacological measures as appropriate and evaluate response   Consider cultural and social influences on pain and pain management   Notify Licensed Independent Practitioner if interventions unsuccessful or patient reports new pain     Problem: Skin/Tissue Integrity  Goal: Absence of new skin breakdown  Description: 1.  Monitor for areas of redness and/or skin breakdown  2.  Assess vascular access sites hourly  3.  Every 4-6 hours minimum:  Change oxygen saturation probe site  4.  Every 4-6 hours:  If on nasal continuous positive airway pressure, respiratory therapy assess nares and determine need for appliance change or resting period.  Outcome: Progressing     Problem: Confusion  Goal: Confusion, delirium, dementia, or psychosis is improved or at baseline  Description: INTERVENTIONS:  1. Assess for possible contributors to thought disturbance, including medications, impaired vision or hearing, underlying metabolic abnormalities, dehydration, psychiatric diagnoses, and notify attending LIP  2. [place] high risk fall precautions, as indicated  3. Provide frequent short contacts to provide reality reorientation, refocusing and direction  4. Decrease environmental stimuli, including noise as appropriate  5. Monitor and intervene to maintain adequate nutrition, hydration, elimination, sleep and activity  6. If unable to ensure safety 
End of Shift Note    Bedside shift change report given to   Yanni NAZARIO      (oncoming nurse) by Alea Kaur RN (offgoing nurse).  Report included the following information Nurse Handoff Report    Shift worked:  Night shift      Shift summary and any significant changes:      At shift change code stroke called. Janine Land assisted patient down to CT and to the neuro consult. 2000 MD [place] notified for need of hospitalist and for BP medication. Hospitalist consulted. PRN lebatalol ordered. Dayshift RN removed patient's TR band around 2000 since the patient was trying to take it off herself.         2152: Patient's  called RN into the room. Patient was found with her legs thrown over the side rails and appeared to be trying to pull on something to sit herself up. Patient not easily redirected and was pushing staff members trying to assist getting her back into the bed. RN messaged hospitalist for bedside  to help redirect patient as needed.       2210: Patient's  called out for patient sudden episode of vomiting. Patient appeared to swallow some of the vomit. O2 sats @ 99%. Patient is still unable to be oriented and has episodes of confusion where she is trying to climb out of bed and is pushing staff members. Hospitalist [place] messaged for IV zofran.     0316 RN notified provider that the patient had an elevated temp. See below for conversation:         Иван Brunner! This patient has spiked a fever of 103.1. Earlier it was 98.5 at the 11PM vitals. Room temperature was near almost 80 degrees and she has a ton of blankets on. I have turned the temperature down and decreased some of the layers on her. I am going to retake her temp again and then give some tylenol. Howver, I wanted to see if you thought a lactic would be beneficial to draw? I am about to draw her AM labs, so I just wanted to check.  Read 3:35 AM   1/14/25 3:29 AM   I will also need an order for rectal supp. 
I was called about elevated /90, patient on losartan 100 mg given in AM. Has received IV labetalol 10 mg x 1  Patient is asymptomatic   Will start nifedipine 60 mg daily   
Predictive Model Details          87 (Warning)  Factor Value    Calculated 1/14/2025 09:46 33% [place] coma scale 6    Deterioration Index Model 14% Age 84 years old     12% Supplemental oxygen Nasal cannula     11% Respiratory rate 24     10% Neurological exam X     7% Systolic 186     4% Pulse 109     4% Cardiac rhythm Sinus tachy     4% WBC count abnormal (14.9 K/uL)     2% Sodium abnormal (135 mmol/L)     1% Potassium 4.2 mmol/L     0% Hematocrit 42.6 %     0% Pulse oximetry 97 %     0% Temperature 98.8 °F (37.1 °C)     0% Blood pH 7.36          Problem: Safety - Adult  Goal: Free from fall injury  1/14/2025 0946 by Yanni Izquierdo RN  Outcome: Progressing  Flowsheets (Taken 1/13/2025 2311 by Alea Kaur RN)  Free From Fall Injury: Instruct family/caregiver on patient safety  1/13/2025 2214 by Alea Kaur RN  Outcome: Progressing     Problem: ABCDS Injury Assessment  Goal: Absence of physical injury  1/14/2025 0946 by Yanni Izquierdo RN  Outcome: Progressing  Flowsheets (Taken 1/13/2025 2311 by Alea Kaur RN)  Absence of Physical Injury: Implement safety measures based on patient assessment  1/13/2025 2214 by Alea Kaur RN  Outcome: Progressing     Problem: Chronic Conditions and Co-morbidities  Goal: Patient's chronic conditions and co-morbidity symptoms are monitored and maintained or improved  1/14/2025 0946 by Yanni Izquierdo RN  Outcome: Progressing  1/13/2025 2214 by Alea Kaur RN  Outcome: Progressing  Flowsheets  Taken 1/13/2025 2000 by Alea Kaur RN  Care Plan - Patient's Chronic Conditions and Co-Morbidity Symptoms are Monitored and Maintained or Improved: Monitor and assess patient's chronic conditions and comorbid symptoms for stability, deterioration, or improvement  Taken 1/13/2025 1841 by Shanda Hull RN  Care Plan - Patient's Chronic Conditions and Co-Morbidity Symptoms are Monitored and Maintained or Improved: Collaborate with multidisciplinary team 
Problem: Physical Therapy - Adult  Goal: By Discharge: Performs mobility at highest level of function for planned discharge setting.  See evaluation for individualized goals.  Description: FUNCTIONAL STATUS PRIOR TO ADMISSION: Patient was independent and active without use of DME.    HOME SUPPORT PRIOR TO ADMISSION: The patient lived with spouse but did not require assistance.    Physical Therapy Goals  Initiated 1/17/2025  1.  Patient will move from supine to sit and sit to supine in bed with supervision/set-up within 7 day(s).    2.  Patient will perform sit to stand with supervision/set-up within 7 day(s).  3.  Patient will transfer from bed to chair and chair to bed with contact guard assist using the least restrictive device within 7 day(s).  4.  Patient will ambulate with contact guard assist for 100 feet with the least restrictive device within 7 day(s).   5.  Patient will ascend/descend 3 stairs with  handrail(s) with contact guard assist within 7 day(s).   Outcome: Progressing     PHYSICAL THERAPY TREATMENT    Patient: Katey Sterling (84 y.o. female)  Date: 1/23/2025  Diagnosis: Nonspecific abnormal function study, cardiovascular [R94.30]  Acute encephalopathy [G93.40] Encephalopathy  Procedure(s) (LRB):  Left heart cath / coronary angiography (N/A)  Ultrasound guided vascular access (N/A) 10 Days Post-Op  Precautions: Fall Risk                    ASSESSMENT:  Patient continues to benefit from skilled PT services and is progressing towards goals. Able to progress gait distance overall this session with decreased overall assistance but required multiple standing rest breaks. Patient with overall delayed responses but generally improved this date. Patient needs cues for safety/assistive device management during ambulation and sit<>stand and has limited carryover of education requiring repeated education and cues. Note hearing deficits may be impacting this. Family declining rehab placement and report able 
thinking/processing is slow compared to baseline. Trialed gait without device as she does not utilize device at home and patient reaching for environmental support and increased unsteadiness noted. She is at increased risk for falls and would benefit from ongoing therapy efforts to progress mobility and independence.     PLAN:  Patient continues to benefit from skilled intervention to address the above impairments.  Continue treatment per established plan of care.    Recommendations for staff mobility and toileting assistance:  Recommend that staff completes patient mobility with assist x1 using gait belt and rolling walker.    Recommend for next PT session: further progression of gait with existing device    Recommendation for discharge: (in order for the patient to meet his/her long term goals):   High intensity/comprehensive skilled physical therapy in a multidisciplinary setting as patient is working towards tolerating up to 3 hours of therapy/day 5-7x/week    Other factors to consider for discharge: impaired cognition and high risk for falls    IF patient discharges home will need the following DME: rolling walker  Katey Sterling requires the assistance of a rolling walker to successfully complete daily living tasks such as: bathing, toileting, dressing and grooming.  A rolling walker is necessary due to the patient's impaired ambulation and mobility restrictions, and can ambulate only by using a walker instead of a lesser assistive device, such as a cane or crutch.      Electronically signed by Radha Borden PT on 1/20/2025 at 3:36 PM       SUBJECTIVE:   Patient stated, \"Okay.\"    OBJECTIVE DATA SUMMARY:   Critical Behavior:  Orientation  Orientation Level: Oriented to person;Oriented to place (loosely oriented to situation)  Cognition  Arousal/Alertness: Delayed responses to stimuli (confounded by impaired hearing despite hearing aides)  Following Commands: Follows one step commands with increased 
care was discussed with: physical therapist and registered nurse         Thank you for this referral.  Verena Hurd OT  Minutes: 19

## 2025-01-23 NOTE — CARE COORDINATION
Pt is cleared for d/c from a CM standpoint.    Transition of Care Plan:     RUR: 12%  Prior Level of Functioning: independent   Disposition: home with [facility]  CHLOE: 1/23  If SNF or IPR: Date FOC offered: patient declined   Follow up appointments: PCP, speciality   DME needed: RW - sent to [facility]  Transportation at discharge: family   IM/IMM Medicare/ letter given:  provided   Caregiver Contact:  Ed Sterling [phone]  Discharge Caregiver contacted prior to discharge? To be contacted   Care Conference needed? Not at this time  Barriers to discharge: medical stability     CM emailed [facility] to check on status of acceptance.  CM checked referral to [facility] and pt has been accepted.      10 a.m. CM spoke with pt's spouse to discuss which facility he would like to move forward with. Pt's spouse shared he desires to take pt home with [facility].  CM informed [facility] pt is being discharged.  Family at bedside to transport.  2IM notice reviewed, signed and copy placed on bedside chart.         01/23/25 1118   Services At/After Discharge   Transition of Care Consult (CM Consult) Discharge Planning   Services At/After Discharge Home Health  ([facility])   Mode of Transport at Discharge Other (see comment)  (family at bedside)   Condition of Participation: Discharge Planning   The Plan for Transition of Care is related to the following treatment goals: Mr. Sterling   The Patient and/or Patient Representative was provided with a Choice of Provider? Patient   The Patient and/Or Patient Representative agree with the Discharge Plan? Yes   Freedom of Choice list was provided with basic dialogue that supports the patient's individualized plan of care/goals, treatment preferences, and shares the quality data associated with the providers?  Yes         Shikha Scherer LMSW, [facility]  Care Management, [facility]  [phone]

## 2025-01-23 NOTE — DISCHARGE INSTRUCTIONS
or R.N.'s Signature                                                                  Date/Time                                                                                                                                              Patient or Representative Signature                                                          Date/Time

## 2025-01-23 NOTE — DISCHARGE SUMMARY
Discharge Summary    Name: Katey Sterling  [id number]  YOB: 1940 (Age: 84 y.o.)   Date of Admission: 1/13/2025  Date of Discharge: 1/23/2025  Attending Physician: No att. providers found    Discharge Diagnosis:     Acute encephalopathy  Postprocedural fever resolved  Severe sepsis without shock.  History of left cerebellopontine angle/retrosigmoid meningioma   Acute resp failure with hypoxemia, on 2L nasal canula resolved  ADRIANNA  Hypernatremia  Uncontrolled HTN  Sinus tachycardia  Uncontrolled hyperglycemia, diabetic   CAD status post recent cath  Leukocytosis due to steroids    Consultations:  IP CONSULT TO HOSPITALIST  IP CONSULT TO PHARMACY  IP CONSULT TO HOSPITALIST  IP CONSULT TO INFECTIOUS DISEASES  IP CONSULT TO NEUROLOGY  IP CONSULT TO DIABETES MANAGEMENT  IP CONSULT TO CASE MANAGEMENT  IP CONSULT TO CASE MANAGEMENT      Brief Admission History/Reason for Admission Per Radha Perrin MD:   83 y/o female PMHx of HTN, HLD, T2DM, THANG on CPAP, remote hx of hx of basal cell cancer, breast cancer (1996), and recent dx of meningioma in the cerebellum region  (11/2024, being evaluated by Dr. Perez for possible resection), admitted (1/13) for scheduled LHC for pre- op clearance for possible meningioma resection.  LHC demonstrated 40 to 50% LAD stenosis, no intervention.  Initially postprocedure Awake/oriented, with subsequent acute onset of confusion, agitation and slurred speech.  CTH demonstrated known mass lesion consistent with meningioma, no vasogenic edema. Overnight worsening mentation, + Febrile (103), started on broad spectrum antibiotics.  MRI brain without-no significant change in left cerebellar pontine angle/retrosigmoid meningioma.  CTA chest-negative.CT A/P-no acute findings, pancreatic head region cyst/cystic mass,colonic diverticulosis, moderate rectal stool distention. Evening 18/14. + obtunded, [facility] Ph 7.39/31.8 ICU consulted d/t concern for airway

## 2025-01-23 NOTE — DIABETES MGMT
[facility]  DIABETES MANAGEMENT CONSULT    Consulted by Provider for advanced nursing evaluation and care for inpatient blood glucose management.    Evaluation and Action Plan   Katey Sterling is a 84 year old female with a hx of Type 2 diabetes. The patient reportedly takes weekly Ozempic at home. Her current A1c 7.2% demonstrates good BG control. The patient reportedly was admitted after complications of a scheduled cardiac catheterizations. The patient did not receive cardiac stents. The patient is reportedly improving. The patient was using an insulin infusion to help control Bg's as she is receiving high doses scheduled steroids. Diabetes management consult to assist with insulin dosing and transition off the insulin infusion. Transition dose ordered and NPH scheduled with dexamethasone dosing. Dexamethasone dosing decreased to q 12 hours. We will follow with this patient, monitoring Bg trends and making adjustments as needed.     Blood glucose pattern        Management Rationale Action Plan   Medication   Basal needs Using 0.3 units/kg/D based on weight  One time dose of 21 units Lntus ordered to cover basal insulin needs; will likely continue tomorrow   Nutritional needs Covers carb load in meals    Corrective insulin Using medium dose sensitivity based on weight    Overriding steroid effect Using 0.25 units/kg/q 12 hrs based on weight  Use 18 units NPH with ecah 10 mg dexamethasone dose   Additional orders  Carb consistent diet (60g CHO/meal)       Diabetes Discharge Plan   Medication  TBD   Referral  []        Outpatient diabetes education   Additional orders            Initial Presentation   Katey Sterling is a 84 y.o. female   LAB: Glucose 147. A1c 7.3%  Narrative & Impression  EXAM: CT CODE NEURO HEAD WO CONTRAST     CLINICAL HISTORY: Altered mental status, weakness  INDICATION: Altered mental status, weakness  COMPARISON: 11/4/2024.  CT dose reduction was achieved 
[facility]  DIABETES MANAGEMENT CONSULT    Consulted by Provider for advanced nursing evaluation and care for inpatient blood glucose management.    Evaluation and Action Plan   Katey Sterling is a 84 year old female with a hx of Type 2 diabetes. The patient reportedly takes weekly Ozempic at home. Her current A1c 7.2% demonstrates good BG control. The patient reportedly was admitted after complications of a scheduled cardiac catheterizations. The patient did not receive cardiac stents. The patient is reportedly improving. The patient was using an insulin infusion to help control Bg's as she is receiving high doses scheduled steroids. Diabetes management consult to assist with insulin dosing and transition off the insulin infusion. Transition dose ordered and NPH scheduled with dexamethasone dosing. Dexamethasone dosing decreased to q 12 hours. We will follow with this patient, monitoring Bg trends and making adjustments as needed.   Bg's improving but remain elevated. I have adjusted the insulin dosing.   Steroid weaning down. Bg's improved. I will continue the same insulin dosing for now. Petry consider adding meal time insulin once patient is consistently eating greater than 50% or more with meals.   Blood glucose pattern        Management Rationale Action Plan   Medication   Basal needs Using 0.3 units/kg/D based on weight  Use  22 units Lantus daily   Nutritional needs Covers carb load in meals    Corrective insulin Using medium dose sensitivity based on weight    Overriding steroid effect Using 0.3 units/kg/q 12 hrs based on weight  Use 22 units NPH with ecah 6 mg dexamethasone dose   Additional orders  Carb consistent diet (60g CHO/meal)       Diabetes Discharge Plan   Medication  TBD   Referral  []        Outpatient diabetes education   Additional orders            Initial Presentation   Katey Sterling is a 84 y.o. female   LAB: Glucose 147. A1c 7.3%  Narrative & 
[facility]  DIABETES MANAGEMENT CONSULT    Consulted by Provider for advanced nursing evaluation and care for inpatient blood glucose management.    Evaluation and Action Plan   Katey Sterling is a 84 year old female with a hx of Type 2 diabetes. The patient reportedly takes weekly Ozempic at home. Her current A1c 7.2% demonstrates good BG control. The patient reportedly was admitted after complications of a scheduled cardiac catheterizations. The patient did not receive cardiac stents. The patient is reportedly improving. The patient was using an insulin infusion to help control Bg's as she is receiving high doses scheduled steroids. Diabetes management consult to assist with insulin dosing and transition off the insulin infusion. Transition dose ordered and NPH scheduled with dexamethasone dosing. Dexamethasone dosing decreased to q 12 hours. We will follow with this patient, monitoring Bg trends and making adjustments as needed.   Bg's improving but remain elevated. I have adjusted the insulin dosing.   Steroid weaning down. Bg's improved. I will continue the same insulin dosing for now. Use  meal time insulin if  patient is  eating greater than 50% or more of meals.   Bg's are stable. The patient is anticipated to be discharged on 40 mg prednisone for next 5 days. He will likely need to continue the insulin while using the prednisone. She can resume her Ozempic and follow-up with PCP for future diabetes management.     Blood glucose pattern            Diabetes Discharge Plan   Medication  Use 14 units NPH with 40 mg prednisone  Resume Ozempic  Monitor BG's   Follow-up with PCP for future diabetes management   Additional orders            Initial Presentation   Katey Sterling is a 84 y.o. female   LAB: Glucose 147. A1c 7.3%  Narrative & Impression  EXAM: CT CODE NEURO HEAD WO CONTRAST     CLINICAL HISTORY: Altered mental status, weakness  INDICATION: Altered mental status, 
[facility]  DIABETES MANAGEMENT CONSULT    Consulted by Provider for advanced nursing evaluation and care for inpatient blood glucose management.    Evaluation and Action Plan   Katey Sterling is a 84 year old female with a hx of Type 2 diabetes. The patient reportedly takes weekly Ozempic at home. Her current A1c 7.2% demonstrates good BG control. The patient reportedly was admitted after complications of a scheduled cardiac catheterizations. The patient did not receive cardiac stents. The patient is reportedly improving. The patient was using an insulin infusion to help control Bg's as she is receiving high doses scheduled steroids. Diabetes management consult to assist with insulin dosing and transition off the insulin infusion. Transition dose ordered and NPH scheduled with dexamethasone dosing. Dexamethasone dosing decreased to q 12 hours. We will follow with this patient, monitoring Bg trends and making adjustments as needed.   Bg's improving but remain elevated. I have adjusted the insulin dosing.   Steroid weaning down. Bg's improved. I will continue the same insulin dosing for now. Use  meal time insulin if  patient is  eating greater than 50% or more of meals.     Blood glucose pattern        Management Rationale Action Plan   Medication   Basal needs Using 0.3 units/kg/D based on weight  Use  22 units Lantus daily   Nutritional needs Covers carb load in meals    Corrective insulin Using medium dose sensitivity based on weight    Overriding steroid effect Using 0.3 units/kg/q 12 hrs based on weight  Use 22 units NPH with ecah 6 mg dexamethasone dose   Additional orders  Carb consistent diet (60g CHO/meal)       Diabetes Discharge Plan   Medication  TBD   Referral  []        Outpatient diabetes education   Additional orders            Initial Presentation   Katey Sterling is a 84 y.o. female   LAB: Glucose 147. A1c 7.3%  Narrative & Impression  EXAM: CT CODE NEURO HEAD WO 
Lipitor. Abx. Insulin. BP management.     Hospital Course   Clinical progress has been uncomplicated    Diabetes History   Type Diabetes- Type 2 DM.   Ambulatory BG management provided by: PCP  Family History: Resides with . Independent with ADL's prior to admission. Family at bedside and supportive.     Lab Results   Component Value Date    LABA1C 7.2 (H) 01/15/2025    LABA1C 7.3 (H) 01/14/2025    LABA1C 8.1 (H) 08/14/2024     Diabetes-related Medical History    Macrovascular disease  CAD      Diabetes Medication History  Diabetes drug class Diabetes drug name Additional Comments   GLP-1 Laci Semaglutide / Ozempic      Diabetes self-management practices:   Eating pattern Deferred   [x] Eating a carbohydrate-controlled meal plan         Physical activity pattern Deferred   [x] Employing a physical activity program to control BG    Monitoring pattern   [x] Testing BGs sufficiently to inform self-management adjustments     Taking medications pattern  [x] Consistent administration  [x] Affordable  Reducing risks  [x] Influenza:   01/15/2024   [x] Pneumonia: 10/08/2015      Social determinants of health impacting diabetes self-management practices    Concerned that you need to know more about how to stay healthy with diabetes    Overall evaluation:    [] Not achieving A1c target with drug therapy & self-care practices    Subjective   \" I feel great\"      Objective   Physical exam  General Overweight  female; ill-appearing. Conversant and cooperative  Neuro  Alert, oriented   Vital Signs   Vitals:    01/17/25 1440   BP: (!) 126/54   Pulse: 90   Resp: 26   Temp: 98.5 °F (36.9 °C)   SpO2: 97%         Laboratory  Recent Labs     01/15/25  0357 01/16/25  0354 01/17/25  0332   WBC 15.6* 20.3* 20.6*   HGB 13.7 11.8 12.6   HCT 41.2 35.7 37.3   MCV 90.0 89.7 88.6    189 189     Recent Labs     01/15/25  0401 01/16/25  0354 01/17/25  0332    149* 140   K 3.3* 3.6 3.1*   * 120* 108   CO2 23 25 24   PHOS

## 2025-01-24 ENCOUNTER — ANESTHESIA EVENT (OUTPATIENT)
Facility: HOSPITAL | Age: 85
End: 2025-01-24
Payer: MEDICARE

## 2025-01-24 ENCOUNTER — TELEPHONE (OUTPATIENT)
Age: 85
End: 2025-01-24

## 2025-01-24 ENCOUNTER — HOSPITAL ENCOUNTER (INPATIENT)
Facility: HOSPITAL | Age: 85
LOS: 6 days | Discharge: NURSING, REHAB OR LONG-TERM CARE | DRG: 326 | End: 2025-01-30
Attending: EMERGENCY MEDICINE | Admitting: INTERNAL MEDICINE
Payer: MEDICARE

## 2025-01-24 ENCOUNTER — ANESTHESIA (OUTPATIENT)
Facility: HOSPITAL | Age: 85
End: 2025-01-24
Payer: MEDICARE

## 2025-01-24 DIAGNOSIS — R19.8 PERFORATED ABDOMINAL VISCUS: ICD-10-CM

## 2025-01-24 DIAGNOSIS — K66.8 PNEUMOPERITONEUM: Primary | ICD-10-CM

## 2025-01-24 DIAGNOSIS — E11.21 TYPE 2 DIABETES MELLITUS WITH DIABETIC NEPHROPATHY, WITHOUT LONG-TERM CURRENT USE OF INSULIN (HCC): ICD-10-CM

## 2025-01-24 PROBLEM — K25.5: Status: ACTIVE | Noted: 2025-01-24

## 2025-01-24 LAB
ALBUMIN SERPL-MCNC: 2 G/DL (ref 3.5–5)
ALBUMIN/GLOB SERPL: 0.6 (ref 1.1–2.2)
ALP SERPL-CCNC: 77 U/L (ref 45–117)
ALT SERPL-CCNC: 26 U/L (ref 12–78)
ANION GAP SERPL CALC-SCNC: 6 MMOL/L (ref 2–12)
AST SERPL-CCNC: 9 U/L (ref 15–37)
BASOPHILS # BLD: 0.09 K/UL (ref 0–0.1)
BASOPHILS NFR BLD: 0.3 % (ref 0–1)
BILIRUB SERPL-MCNC: 0.3 MG/DL (ref 0.2–1)
BUN SERPL-MCNC: 17 MG/DL (ref 6–20)
BUN/CREAT SERPL: 23 (ref 12–20)
CALCIUM SERPL-MCNC: 8.5 MG/DL (ref 8.5–10.1)
CHLORIDE SERPL-SCNC: 113 MMOL/L (ref 97–108)
CO2 SERPL-SCNC: 23 MMOL/L (ref 21–32)
COMMENT:: NORMAL
CREAT SERPL-MCNC: 0.73 MG/DL (ref 0.55–1.02)
DIFFERENTIAL METHOD BLD: ABNORMAL
EOSINOPHIL # BLD: 0 K/UL (ref 0–0.4)
EOSINOPHIL NFR BLD: 0 % (ref 0–7)
ERYTHROCYTE [DISTWIDTH] IN BLOOD BY AUTOMATED COUNT: 14.5 % (ref 11.5–14.5)
GLOBULIN SER CALC-MCNC: 3.1 G/DL (ref 2–4)
GLUCOSE SERPL-MCNC: 327 MG/DL (ref 65–100)
HCT VFR BLD AUTO: 30.1 % (ref 35–47)
HGB BLD-MCNC: 9.8 G/DL (ref 11.5–16)
IMM GRANULOCYTES # BLD AUTO: 0.4 K/UL (ref 0–0.04)
IMM GRANULOCYTES NFR BLD AUTO: 1.3 % (ref 0–0.5)
LACTATE BLD-SCNC: 1.78 MMOL/L (ref 0.4–2)
LYMPHOCYTES # BLD: 0.65 K/UL (ref 0.8–3.5)
LYMPHOCYTES NFR BLD: 2.1 % (ref 12–49)
MCH RBC QN AUTO: 30.3 PG (ref 26–34)
MCHC RBC AUTO-ENTMCNC: 32.6 G/DL (ref 30–36.5)
MCV RBC AUTO: 93.2 FL (ref 80–99)
MONOCYTES # BLD: 0.83 K/UL (ref 0–1)
MONOCYTES NFR BLD: 2.7 % (ref 5–13)
NEUTS SEG # BLD: 28.83 K/UL (ref 1.8–8)
NEUTS SEG NFR BLD: 93.6 % (ref 32–75)
NRBC # BLD: 0 K/UL (ref 0–0.01)
NRBC BLD-RTO: 0 PER 100 WBC
PLATELET # BLD AUTO: 341 K/UL (ref 150–400)
PMV BLD AUTO: 10.8 FL (ref 8.9–12.9)
POTASSIUM SERPL-SCNC: 3.8 MMOL/L (ref 3.5–5.1)
PROT SERPL-MCNC: 5.1 G/DL (ref 6.4–8.2)
RBC # BLD AUTO: 3.23 M/UL (ref 3.8–5.2)
RBC MORPH BLD: ABNORMAL
SODIUM SERPL-SCNC: 142 MMOL/L (ref 136–145)
SPECIMEN HOLD: NORMAL
WBC # BLD AUTO: 30.8 K/UL (ref 3.6–11)
WBC MORPH BLD: ABNORMAL

## 2025-01-24 PROCEDURE — 85025 COMPLETE CBC W/AUTO DIFF WBC: CPT

## 2025-01-24 PROCEDURE — 86850 RBC ANTIBODY SCREEN: CPT

## 2025-01-24 PROCEDURE — 86901 BLOOD TYPING SEROLOGIC RH(D): CPT

## 2025-01-24 PROCEDURE — 96375 TX/PRO/DX INJ NEW DRUG ADDON: CPT

## 2025-01-24 PROCEDURE — 36415 COLL VENOUS BLD VENIPUNCTURE: CPT

## 2025-01-24 PROCEDURE — 83605 ASSAY OF LACTIC ACID: CPT

## 2025-01-24 PROCEDURE — 80053 COMPREHEN METABOLIC PANEL: CPT

## 2025-01-24 PROCEDURE — 99285 EMERGENCY DEPT VISIT HI MDM: CPT

## 2025-01-24 PROCEDURE — 6360000002 HC RX W HCPCS: Performed by: EMERGENCY MEDICINE

## 2025-01-24 PROCEDURE — 86900 BLOOD TYPING SEROLOGIC ABO: CPT

## 2025-01-24 PROCEDURE — 1100000000 HC RM PRIVATE

## 2025-01-24 PROCEDURE — 96374 THER/PROPH/DIAG INJ IV PUSH: CPT

## 2025-01-24 RX ORDER — POTASSIUM CHLORIDE 7.45 MG/ML
10 INJECTION INTRAVENOUS PRN
Status: DISCONTINUED | OUTPATIENT
Start: 2025-01-24 | End: 2025-01-27

## 2025-01-24 RX ORDER — SODIUM CHLORIDE, SODIUM LACTATE, POTASSIUM CHLORIDE, CALCIUM CHLORIDE 600; 310; 30; 20 MG/100ML; MG/100ML; MG/100ML; MG/100ML
INJECTION, SOLUTION INTRAVENOUS CONTINUOUS
Status: DISCONTINUED | OUTPATIENT
Start: 2025-01-24 | End: 2025-01-25

## 2025-01-24 RX ORDER — ACETAMINOPHEN 325 MG/1
650 TABLET ORAL EVERY 6 HOURS PRN
Status: DISCONTINUED | OUTPATIENT
Start: 2025-01-24 | End: 2025-01-30 | Stop reason: HOSPADM

## 2025-01-24 RX ORDER — POTASSIUM CHLORIDE 29.8 MG/ML
20 INJECTION INTRAVENOUS PRN
Status: DISCONTINUED | OUTPATIENT
Start: 2025-01-24 | End: 2025-01-27

## 2025-01-24 RX ORDER — ACETAMINOPHEN 650 MG/1
650 SUPPOSITORY RECTAL EVERY 6 HOURS PRN
Status: DISCONTINUED | OUTPATIENT
Start: 2025-01-24 | End: 2025-01-30 | Stop reason: HOSPADM

## 2025-01-24 RX ORDER — ONDANSETRON 2 MG/ML
4 INJECTION INTRAMUSCULAR; INTRAVENOUS
Status: DISCONTINUED | OUTPATIENT
Start: 2025-01-24 | End: 2025-01-25

## 2025-01-24 RX ORDER — SODIUM CHLORIDE 9 MG/ML
INJECTION, SOLUTION INTRAVENOUS PRN
Status: DISCONTINUED | OUTPATIENT
Start: 2025-01-24 | End: 2025-01-30 | Stop reason: HOSPADM

## 2025-01-24 RX ORDER — SODIUM CHLORIDE 0.9 % (FLUSH) 0.9 %
5-40 SYRINGE (ML) INJECTION EVERY 12 HOURS SCHEDULED
Status: DISCONTINUED | OUTPATIENT
Start: 2025-01-25 | End: 2025-01-30 | Stop reason: HOSPADM

## 2025-01-24 RX ORDER — MAGNESIUM SULFATE IN WATER 40 MG/ML
2000 INJECTION, SOLUTION INTRAVENOUS PRN
Status: DISCONTINUED | OUTPATIENT
Start: 2025-01-24 | End: 2025-01-30 | Stop reason: HOSPADM

## 2025-01-24 RX ORDER — MORPHINE SULFATE 4 MG/ML
4 INJECTION, SOLUTION INTRAMUSCULAR; INTRAVENOUS
Status: DISCONTINUED | OUTPATIENT
Start: 2025-01-24 | End: 2025-01-30 | Stop reason: HOSPADM

## 2025-01-24 RX ORDER — ONDANSETRON 2 MG/ML
4 INJECTION INTRAMUSCULAR; INTRAVENOUS EVERY 6 HOURS PRN
Status: DISCONTINUED | OUTPATIENT
Start: 2025-01-24 | End: 2025-01-30 | Stop reason: HOSPADM

## 2025-01-24 RX ORDER — POLYETHYLENE GLYCOL 3350 17 G/17G
17 POWDER, FOR SOLUTION ORAL DAILY PRN
Status: DISCONTINUED | OUTPATIENT
Start: 2025-01-24 | End: 2025-01-27

## 2025-01-24 RX ORDER — ONDANSETRON 4 MG/1
4 TABLET, ORALLY DISINTEGRATING ORAL EVERY 8 HOURS PRN
Status: DISCONTINUED | OUTPATIENT
Start: 2025-01-24 | End: 2025-01-30 | Stop reason: HOSPADM

## 2025-01-24 RX ORDER — LIDOCAINE HYDROCHLORIDE 20 MG/ML
JELLY TOPICAL PRN
Status: DISCONTINUED | OUTPATIENT
Start: 2025-01-24 | End: 2025-01-25

## 2025-01-24 RX ORDER — SODIUM CHLORIDE 0.9 % (FLUSH) 0.9 %
5-40 SYRINGE (ML) INJECTION PRN
Status: DISCONTINUED | OUTPATIENT
Start: 2025-01-24 | End: 2025-01-30 | Stop reason: HOSPADM

## 2025-01-24 RX ADMIN — ONDANSETRON 4 MG: 2 INJECTION, SOLUTION INTRAMUSCULAR; INTRAVENOUS at 22:04

## 2025-01-24 RX ADMIN — MORPHINE SULFATE 4 MG: 4 INJECTION, SOLUTION INTRAMUSCULAR; INTRAVENOUS at 22:08

## 2025-01-24 ASSESSMENT — PAIN DESCRIPTION - LOCATION: LOCATION: ABDOMEN

## 2025-01-24 ASSESSMENT — PAIN - FUNCTIONAL ASSESSMENT: PAIN_FUNCTIONAL_ASSESSMENT: 0-10

## 2025-01-24 ASSESSMENT — PAIN SCALES - GENERAL: PAINLEVEL_OUTOF10: 10

## 2025-01-24 NOTE — TELEPHONE ENCOUNTER
Care Transitions Initial Follow Up Call    Outreach made within 2 business days of discharge: Yes    Patient: Katey Sterling Patient : 1940   MRN: [id number]  Reason for Admission: Encephalopathy  Discharge Date: 25       Spoke with: N/A left message     Discharge department/facility: [facility]      Scheduled appointment with PCP within 7-14 days    Follow Up  Future Appointments   Date Time Provider Department Center   2025 10:50 AM Catalino Gaines MD LM MAIN [facility] DEP   2025  9:20 AM Madiha Garcia, DANIEL [id number] BS AMB   2025  3:50 PM Carol Kang, APRN - NP [facility] BS AMB       CHARLENE CORTES, LPN

## 2025-01-25 ENCOUNTER — APPOINTMENT (OUTPATIENT)
Facility: HOSPITAL | Age: 85
DRG: 326 | End: 2025-01-25
Payer: MEDICARE

## 2025-01-25 LAB
ABO + RH BLD: NORMAL
ANION GAP SERPL CALC-SCNC: 7 MMOL/L (ref 2–12)
BLOOD GROUP ANTIBODIES SERPL: NORMAL
BUN SERPL-MCNC: 17 MG/DL (ref 6–20)
BUN/CREAT SERPL: 24 (ref 12–20)
CALCIUM SERPL-MCNC: 8.9 MG/DL (ref 8.5–10.1)
CHLORIDE SERPL-SCNC: 113 MMOL/L (ref 97–108)
CO2 SERPL-SCNC: 19 MMOL/L (ref 21–32)
CREAT SERPL-MCNC: 0.71 MG/DL (ref 0.55–1.02)
ERYTHROCYTE [DISTWIDTH] IN BLOOD BY AUTOMATED COUNT: 14.8 % (ref 11.5–14.5)
GLUCOSE BLD STRIP.AUTO-MCNC: 252 MG/DL (ref 65–117)
GLUCOSE BLD STRIP.AUTO-MCNC: 258 MG/DL (ref 65–117)
GLUCOSE BLD STRIP.AUTO-MCNC: 271 MG/DL (ref 65–117)
GLUCOSE BLD STRIP.AUTO-MCNC: 295 MG/DL (ref 65–117)
GLUCOSE SERPL-MCNC: 299 MG/DL (ref 65–100)
HCT VFR BLD AUTO: 32.3 % (ref 35–47)
HGB BLD-MCNC: 10.3 G/DL (ref 11.5–16)
MAGNESIUM SERPL-MCNC: 2.2 MG/DL (ref 1.6–2.4)
MCH RBC QN AUTO: 30.7 PG (ref 26–34)
MCHC RBC AUTO-ENTMCNC: 31.9 G/DL (ref 30–36.5)
MCV RBC AUTO: 96.4 FL (ref 80–99)
NRBC # BLD: 0 K/UL (ref 0–0.01)
NRBC BLD-RTO: 0 PER 100 WBC
PHOSPHATE SERPL-MCNC: 3.9 MG/DL (ref 2.6–4.7)
PLATELET # BLD AUTO: 173 K/UL (ref 150–400)
PMV BLD AUTO: 12.3 FL (ref 8.9–12.9)
POTASSIUM SERPL-SCNC: 5 MMOL/L (ref 3.5–5.1)
RBC # BLD AUTO: 3.35 M/UL (ref 3.8–5.2)
SERVICE CMNT-IMP: ABNORMAL
SODIUM SERPL-SCNC: 139 MMOL/L (ref 136–145)
SPECIMEN EXP DATE BLD: NORMAL
WBC # BLD AUTO: 30 K/UL (ref 3.6–11)

## 2025-01-25 PROCEDURE — 2500000003 HC RX 250 WO HCPCS: Performed by: INTERNAL MEDICINE

## 2025-01-25 PROCEDURE — 6370000000 HC RX 637 (ALT 250 FOR IP): Performed by: INTERNAL MEDICINE

## 2025-01-25 PROCEDURE — 74018 RADEX ABDOMEN 1 VIEW: CPT

## 2025-01-25 PROCEDURE — 2709999900 HC NON-CHARGEABLE SUPPLY: Performed by: STUDENT IN AN ORGANIZED HEALTH CARE EDUCATION/TRAINING PROGRAM

## 2025-01-25 PROCEDURE — 2060000000 HC ICU INTERMEDIATE R&B

## 2025-01-25 PROCEDURE — 0DQ64ZZ REPAIR STOMACH, PERCUTANEOUS ENDOSCOPIC APPROACH: ICD-10-PCS | Performed by: STUDENT IN AN ORGANIZED HEALTH CARE EDUCATION/TRAINING PROGRAM

## 2025-01-25 PROCEDURE — 85027 COMPLETE CBC AUTOMATED: CPT

## 2025-01-25 PROCEDURE — 6360000002 HC RX W HCPCS: Performed by: NURSE PRACTITIONER

## 2025-01-25 PROCEDURE — 2580000003 HC RX 258: Performed by: NURSE PRACTITIONER

## 2025-01-25 PROCEDURE — 6360000002 HC RX W HCPCS: Performed by: STUDENT IN AN ORGANIZED HEALTH CARE EDUCATION/TRAINING PROGRAM

## 2025-01-25 PROCEDURE — 2580000003 HC RX 258: Performed by: INTERNAL MEDICINE

## 2025-01-25 PROCEDURE — 2580000003 HC RX 258: Performed by: STUDENT IN AN ORGANIZED HEALTH CARE EDUCATION/TRAINING PROGRAM

## 2025-01-25 PROCEDURE — 2500000003 HC RX 250 WO HCPCS: Performed by: NURSE PRACTITIONER

## 2025-01-25 PROCEDURE — 3600000003 HC SURGERY LEVEL 3 BASE: Performed by: STUDENT IN AN ORGANIZED HEALTH CARE EDUCATION/TRAINING PROGRAM

## 2025-01-25 PROCEDURE — 3700000000 HC ANESTHESIA ATTENDED CARE: Performed by: STUDENT IN AN ORGANIZED HEALTH CARE EDUCATION/TRAINING PROGRAM

## 2025-01-25 PROCEDURE — 2500000003 HC RX 250 WO HCPCS: Performed by: NURSE ANESTHETIST, CERTIFIED REGISTERED

## 2025-01-25 PROCEDURE — 3700000001 HC ADD 15 MINUTES (ANESTHESIA): Performed by: STUDENT IN AN ORGANIZED HEALTH CARE EDUCATION/TRAINING PROGRAM

## 2025-01-25 PROCEDURE — 51798 US URINE CAPACITY MEASURE: CPT

## 2025-01-25 PROCEDURE — 3600000013 HC SURGERY LEVEL 3 ADDTL 15MIN: Performed by: STUDENT IN AN ORGANIZED HEALTH CARE EDUCATION/TRAINING PROGRAM

## 2025-01-25 PROCEDURE — 6360000002 HC RX W HCPCS: Performed by: INTERNAL MEDICINE

## 2025-01-25 PROCEDURE — 84100 ASSAY OF PHOSPHORUS: CPT

## 2025-01-25 PROCEDURE — 80048 BASIC METABOLIC PNL TOTAL CA: CPT

## 2025-01-25 PROCEDURE — 82962 GLUCOSE BLOOD TEST: CPT

## 2025-01-25 PROCEDURE — 6360000002 HC RX W HCPCS: Performed by: NURSE ANESTHETIST, CERTIFIED REGISTERED

## 2025-01-25 PROCEDURE — 83735 ASSAY OF MAGNESIUM: CPT

## 2025-01-25 PROCEDURE — 87040 BLOOD CULTURE FOR BACTERIA: CPT

## 2025-01-25 PROCEDURE — 36415 COLL VENOUS BLD VENIPUNCTURE: CPT

## 2025-01-25 PROCEDURE — 2580000003 HC RX 258: Performed by: NURSE ANESTHETIST, CERTIFIED REGISTERED

## 2025-01-25 PROCEDURE — 43235 EGD DIAGNOSTIC BRUSH WASH: CPT | Performed by: STUDENT IN AN ORGANIZED HEALTH CARE EDUCATION/TRAINING PROGRAM

## 2025-01-25 RX ORDER — SODIUM CHLORIDE, SODIUM LACTATE, POTASSIUM CHLORIDE, CALCIUM CHLORIDE 600; 310; 30; 20 MG/100ML; MG/100ML; MG/100ML; MG/100ML
INJECTION, SOLUTION INTRAVENOUS
Status: DISCONTINUED | OUTPATIENT
Start: 2025-01-25 | End: 2025-01-25 | Stop reason: SDUPTHER

## 2025-01-25 RX ORDER — FENTANYL CITRATE 50 UG/ML
25 INJECTION, SOLUTION INTRAMUSCULAR; INTRAVENOUS EVERY 5 MIN PRN
Status: DISCONTINUED | OUTPATIENT
Start: 2025-01-25 | End: 2025-01-25

## 2025-01-25 RX ORDER — HEPARIN SODIUM 5000 [USP'U]/ML
5000 INJECTION, SOLUTION INTRAVENOUS; SUBCUTANEOUS EVERY 8 HOURS SCHEDULED
Status: DISCONTINUED | OUTPATIENT
Start: 2025-01-25 | End: 2025-01-30 | Stop reason: HOSPADM

## 2025-01-25 RX ORDER — SODIUM CHLORIDE 0.9 % (FLUSH) 0.9 %
5-40 SYRINGE (ML) INJECTION EVERY 12 HOURS SCHEDULED
Status: DISCONTINUED | OUTPATIENT
Start: 2025-01-25 | End: 2025-01-25

## 2025-01-25 RX ORDER — NALOXONE HYDROCHLORIDE 0.4 MG/ML
INJECTION, SOLUTION INTRAMUSCULAR; INTRAVENOUS; SUBCUTANEOUS PRN
Status: DISCONTINUED | OUTPATIENT
Start: 2025-01-25 | End: 2025-01-25

## 2025-01-25 RX ORDER — SODIUM CHLORIDE 0.9 % (FLUSH) 0.9 %
5-40 SYRINGE (ML) INJECTION PRN
Status: DISCONTINUED | OUTPATIENT
Start: 2025-01-25 | End: 2025-01-25

## 2025-01-25 RX ORDER — BUPIVACAINE HYDROCHLORIDE 5 MG/ML
INJECTION, SOLUTION PERINEURAL PRN
Status: DISCONTINUED | OUTPATIENT
Start: 2025-01-25 | End: 2025-01-25 | Stop reason: ALTCHOICE

## 2025-01-25 RX ORDER — DEXAMETHASONE SODIUM PHOSPHATE 4 MG/ML
INJECTION, SOLUTION INTRA-ARTICULAR; INTRALESIONAL; INTRAMUSCULAR; INTRAVENOUS; SOFT TISSUE
Status: DISCONTINUED | OUTPATIENT
Start: 2025-01-25 | End: 2025-01-25 | Stop reason: SDUPTHER

## 2025-01-25 RX ORDER — SODIUM CHLORIDE, SODIUM LACTATE, POTASSIUM CHLORIDE, CALCIUM CHLORIDE 600; 310; 30; 20 MG/100ML; MG/100ML; MG/100ML; MG/100ML
INJECTION, SOLUTION INTRAVENOUS CONTINUOUS
Status: DISCONTINUED | OUTPATIENT
Start: 2025-01-25 | End: 2025-01-25

## 2025-01-25 RX ORDER — GLUCAGON 1 MG/ML
1 KIT INJECTION PRN
Status: DISCONTINUED | OUTPATIENT
Start: 2025-01-25 | End: 2025-01-30 | Stop reason: HOSPADM

## 2025-01-25 RX ORDER — DEXTROSE MONOHYDRATE 100 MG/ML
INJECTION, SOLUTION INTRAVENOUS CONTINUOUS PRN
Status: DISCONTINUED | OUTPATIENT
Start: 2025-01-25 | End: 2025-01-30 | Stop reason: HOSPADM

## 2025-01-25 RX ORDER — FENTANYL CITRATE 50 UG/ML
INJECTION, SOLUTION INTRAMUSCULAR; INTRAVENOUS
Status: DISCONTINUED | OUTPATIENT
Start: 2025-01-25 | End: 2025-01-25 | Stop reason: SDUPTHER

## 2025-01-25 RX ORDER — SODIUM CHLORIDE 9 MG/ML
INJECTION, SOLUTION INTRAVENOUS PRN
Status: DISCONTINUED | OUTPATIENT
Start: 2025-01-25 | End: 2025-01-25

## 2025-01-25 RX ORDER — FLUCONAZOLE 2 MG/ML
400 INJECTION, SOLUTION INTRAVENOUS EVERY 24 HOURS
Status: DISCONTINUED | OUTPATIENT
Start: 2025-01-25 | End: 2025-01-30 | Stop reason: HOSPADM

## 2025-01-25 RX ORDER — DROPERIDOL 2.5 MG/ML
0.62 INJECTION, SOLUTION INTRAMUSCULAR; INTRAVENOUS
Status: DISCONTINUED | OUTPATIENT
Start: 2025-01-25 | End: 2025-01-25

## 2025-01-25 RX ORDER — ROCURONIUM BROMIDE 10 MG/ML
INJECTION, SOLUTION INTRAVENOUS
Status: DISCONTINUED | OUTPATIENT
Start: 2025-01-25 | End: 2025-01-25 | Stop reason: SDUPTHER

## 2025-01-25 RX ORDER — SUCCINYLCHOLINE CHLORIDE 20 MG/ML
INJECTION INTRAMUSCULAR; INTRAVENOUS
Status: DISCONTINUED | OUTPATIENT
Start: 2025-01-25 | End: 2025-01-25 | Stop reason: SDUPTHER

## 2025-01-25 RX ORDER — PHENYLEPHRINE HCL IN 0.9% NACL 0.4MG/10ML
SYRINGE (ML) INTRAVENOUS
Status: DISCONTINUED | OUTPATIENT
Start: 2025-01-25 | End: 2025-01-25 | Stop reason: SDUPTHER

## 2025-01-25 RX ORDER — ONDANSETRON 2 MG/ML
4 INJECTION INTRAMUSCULAR; INTRAVENOUS
Status: DISCONTINUED | OUTPATIENT
Start: 2025-01-25 | End: 2025-01-25

## 2025-01-25 RX ORDER — LIDOCAINE HYDROCHLORIDE 10 MG/ML
1 INJECTION, SOLUTION EPIDURAL; INFILTRATION; INTRACAUDAL; PERINEURAL
Status: DISCONTINUED | OUTPATIENT
Start: 2025-01-25 | End: 2025-01-25

## 2025-01-25 RX ORDER — HYDROMORPHONE HYDROCHLORIDE 1 MG/ML
0.5 INJECTION, SOLUTION INTRAMUSCULAR; INTRAVENOUS; SUBCUTANEOUS EVERY 5 MIN PRN
Status: DISCONTINUED | OUTPATIENT
Start: 2025-01-25 | End: 2025-01-25

## 2025-01-25 RX ORDER — ONDANSETRON 2 MG/ML
INJECTION INTRAMUSCULAR; INTRAVENOUS
Status: DISCONTINUED | OUTPATIENT
Start: 2025-01-25 | End: 2025-01-25 | Stop reason: SDUPTHER

## 2025-01-25 RX ORDER — INSULIN LISPRO 100 [IU]/ML
0-8 INJECTION, SOLUTION INTRAVENOUS; SUBCUTANEOUS EVERY 6 HOURS
Status: DISCONTINUED | OUTPATIENT
Start: 2025-01-25 | End: 2025-01-30 | Stop reason: HOSPADM

## 2025-01-25 RX ADMIN — PROPOFOL 70 MG: 10 INJECTION, EMULSION INTRAVENOUS at 00:16

## 2025-01-25 RX ADMIN — LIDOCAINE HYDROCHLORIDE 80 MG: 20 INJECTION, SOLUTION EPIDURAL; INFILTRATION; INTRACAUDAL; PERINEURAL at 00:16

## 2025-01-25 RX ADMIN — DEXAMETHASONE SODIUM PHOSPHATE 4 MG: 4 INJECTION, SOLUTION INTRAMUSCULAR; INTRAVENOUS at 00:30

## 2025-01-25 RX ADMIN — INSULIN HUMAN 5 UNITS: 100 INJECTION, SUSPENSION SUBCUTANEOUS at 22:07

## 2025-01-25 RX ADMIN — SODIUM CHLORIDE, POTASSIUM CHLORIDE, SODIUM LACTATE AND CALCIUM CHLORIDE: 600; 310; 30; 20 INJECTION, SOLUTION INTRAVENOUS at 00:07

## 2025-01-25 RX ADMIN — FENTANYL CITRATE 25 MCG: 50 INJECTION, SOLUTION INTRAMUSCULAR; INTRAVENOUS at 01:10

## 2025-01-25 RX ADMIN — SUGAMMADEX 150 MG: 100 INJECTION, SOLUTION INTRAVENOUS at 02:08

## 2025-01-25 RX ADMIN — SODIUM CHLORIDE, POTASSIUM CHLORIDE, SODIUM LACTATE AND CALCIUM CHLORIDE: 600; 310; 30; 20 INJECTION, SOLUTION INTRAVENOUS at 01:30

## 2025-01-25 RX ADMIN — PIPERACILLIN AND TAZOBACTAM 3375 MG: 3; .375 INJECTION, POWDER, LYOPHILIZED, FOR SOLUTION INTRAVENOUS at 00:35

## 2025-01-25 RX ADMIN — INSULIN LISPRO 4 UNITS: 100 INJECTION, SOLUTION INTRAVENOUS; SUBCUTANEOUS at 22:07

## 2025-01-25 RX ADMIN — HEPARIN SODIUM 5000 UNITS: 5000 INJECTION INTRAVENOUS; SUBCUTANEOUS at 22:07

## 2025-01-25 RX ADMIN — HEPARIN SODIUM 5000 UNITS: 5000 INJECTION INTRAVENOUS; SUBCUTANEOUS at 16:30

## 2025-01-25 RX ADMIN — INSULIN HUMAN 5 UNITS: 100 INJECTION, SUSPENSION SUBCUTANEOUS at 10:20

## 2025-01-25 RX ADMIN — PIPERACILLIN AND TAZOBACTAM 3375 MG: 3; .375 INJECTION, POWDER, LYOPHILIZED, FOR SOLUTION INTRAVENOUS at 16:43

## 2025-01-25 RX ADMIN — PIPERACILLIN AND TAZOBACTAM 3375 MG: 3; .375 INJECTION, POWDER, LYOPHILIZED, FOR SOLUTION INTRAVENOUS at 08:35

## 2025-01-25 RX ADMIN — SODIUM CHLORIDE, PRESERVATIVE FREE 10 ML: 5 INJECTION INTRAVENOUS at 21:36

## 2025-01-25 RX ADMIN — FENTANYL CITRATE 25 MCG: 50 INJECTION, SOLUTION INTRAMUSCULAR; INTRAVENOUS at 00:16

## 2025-01-25 RX ADMIN — Medication 1 AMPULE: at 22:07

## 2025-01-25 RX ADMIN — INSULIN LISPRO 4 UNITS: 100 INJECTION, SOLUTION INTRAVENOUS; SUBCUTANEOUS at 16:40

## 2025-01-25 RX ADMIN — SUCCINYLCHOLINE CHLORIDE 140 MG: 20 INJECTION, SOLUTION INTRAMUSCULAR; INTRAVENOUS at 00:16

## 2025-01-25 RX ADMIN — CALCIUM GLUCONATE: 98 INJECTION, SOLUTION INTRAVENOUS at 18:38

## 2025-01-25 RX ADMIN — FENTANYL CITRATE 25 MCG: 50 INJECTION, SOLUTION INTRAMUSCULAR; INTRAVENOUS at 01:20

## 2025-01-25 RX ADMIN — PANTOPRAZOLE SODIUM 40 MG: 40 INJECTION, POWDER, LYOPHILIZED, FOR SOLUTION INTRAVENOUS at 08:32

## 2025-01-25 RX ADMIN — ONDANSETRON HYDROCHLORIDE 4 MG: 2 INJECTION, SOLUTION INTRAMUSCULAR; INTRAVENOUS at 02:02

## 2025-01-25 RX ADMIN — FLUCONAZOLE 400 MG: 2 INJECTION, SOLUTION INTRAVENOUS at 03:51

## 2025-01-25 RX ADMIN — SODIUM CHLORIDE, POTASSIUM CHLORIDE, SODIUM LACTATE AND CALCIUM CHLORIDE: 600; 310; 30; 20 INJECTION, SOLUTION INTRAVENOUS at 03:01

## 2025-01-25 RX ADMIN — ROCURONIUM BROMIDE 5 MG: 10 INJECTION INTRAVENOUS at 00:16

## 2025-01-25 RX ADMIN — HEPARIN SODIUM 5000 UNITS: 5000 INJECTION INTRAVENOUS; SUBCUTANEOUS at 06:24

## 2025-01-25 RX ADMIN — PIPERACILLIN AND TAZOBACTAM 4500 MG: 4; .5 INJECTION, POWDER, LYOPHILIZED, FOR SOLUTION INTRAVENOUS; PARENTERAL at 03:18

## 2025-01-25 RX ADMIN — Medication 1 AMPULE: at 10:22

## 2025-01-25 RX ADMIN — INSULIN LISPRO 4 UNITS: 100 INJECTION, SOLUTION INTRAVENOUS; SUBCUTANEOUS at 10:20

## 2025-01-25 RX ADMIN — HYDROMORPHONE HYDROCHLORIDE 1 MG: 1 INJECTION, SOLUTION INTRAMUSCULAR; INTRAVENOUS; SUBCUTANEOUS at 02:12

## 2025-01-25 RX ADMIN — ROCURONIUM BROMIDE 45 MG: 10 INJECTION INTRAVENOUS at 00:20

## 2025-01-25 RX ADMIN — Medication 80 MCG: at 00:19

## 2025-01-25 RX ADMIN — SODIUM CHLORIDE, PRESERVATIVE FREE 10 ML: 5 INJECTION INTRAVENOUS at 10:20

## 2025-01-25 RX ADMIN — Medication 40 MCG: at 00:23

## 2025-01-25 RX ADMIN — FENTANYL CITRATE 25 MCG: 50 INJECTION, SOLUTION INTRAMUSCULAR; INTRAVENOUS at 00:40

## 2025-01-25 ASSESSMENT — PAIN SCALES - GENERAL
PAINLEVEL_OUTOF10: 0
PAINLEVEL_OUTOF10: 0

## 2025-01-25 NOTE — OP NOTE
Operative Note      Patient: Katey Sterling  YOB: 1940  MRN: [id number]    Date of Procedure: 1/24/2025    Pre-Op Diagnosis Codes:      * Perforated bowel (HCC) [K63.1]    Post-Op Diagnosis:  Perforated antral ulcer       Procedure(s):  LAPAROSCOPIC RADHA PATCH AND ESOPHAGOGASTRODUODENOSCOPY    Surgeon(s):  Angie Adams MD    Assistant:   Surgical Assistant: Bakari Engel    Anesthesia: General    Estimated Blood Loss (mL): less than 50     Complications: None    Specimens:   * No specimens in log *    Implants:  * No implants in log *      Drains:   Closed/Suction Drain Right RLQ Bulb (Active)       Closed/Suction Drain Right RLQ Bulb (Active)       NG/OG/NJ/NE Tube Nasogastric 18 fr Right nostril (Active)       Urinary Catheter 01/25/25 2 Way (Active)       [REMOVED] Urinary Catheter 01/15/25 [place] (Removed)   $ Urethral catheter insertion $ Not inserted for procedure 01/15/25 1748   Catheter Indications Urinary retention (acute or chronic), continuous bladder irrigation or bladder outlet obstruction 01/16/25 0800   Site Assessment No urethral drainage 01/16/25 0800   Urine Color Yellow 01/16/25 0800   Urine Appearance Clear 01/16/25 0800   Urine Odor Malodorous 01/15/25 1748   Collection Container Standard 01/16/25 0800   Securement Method Securing device (Describe) 01/16/25 0800   Catheter Care  Perineal wipes 01/16/25 0800   Catheter Best Practices  Drainage tube clipped to bed;Catheter secured to thigh;Tamper seal intact;Bag below bladder;Bag not on floor;Lack of dependent loop in tubing;Drainage bag less than half full 01/16/25 0800   Status Draining;Patent 01/16/25 0800   Output (mL) 175 mL 01/16/25 1400       [REMOVED] External Urinary Catheter (Removed)   Site Assessment Clean,dry & intact 01/15/25 0400   Placement Initiated 01/15/25 0400   Securement Method Securing device (Describe) 01/15/25 0400   Catheter Care Catheter/Wick replaced;Suction Canister/Tubing changed

## 2025-01-25 NOTE — ED PROVIDER NOTES
[facility] EMERGENCY DEPARTMENT  EMERGENCY DEPARTMENT ENCOUNTER       Pt Name: Katey Sterling  MRN: [id number]  Birthdate 1940  Date of evaluation: 1/24/2025  Provider: Blaise Ingram MD   PCP: Catalino Gaines MD  Note Started: 9:58 PM 1/24/25     CHIEF COMPLAINT       Chief Complaint   Patient presents with    Transfer     Pt arrives as a transfer from [facility]. Reported abdominal pain and constipation x 2 weeks. Transport reports possible bowel perforation and pancreatic mass. Pt septic at facility. Received 1500mL NS (no urine output yet), antibiotics, zofran, morphine and 25mcg fent. Pt a/o x4. Recently here for a cath        HISTORY OF PRESENT ILLNESS: 1 or more elements      History From: Patient, EMS, History limited by: No limitations     Katey Sterling is a 84 y.o. female with history of CAD, hypertension, diabetes who presents with chief complaint of abdominal pain and constipation.  Patient presents as a transfer from [facility] emergency department where she presented for abdominal pain and constipation, CT scan demonstrated gastric perforation with significant amount of pneumoperitoneum.  Patient was treated with vancomycin and Zosyn, received this around 7 PM and transferred to our facility.  Dr. Stevens was consulted who agrees with transferring patient to the emergency department.  Patient endorses ongoing abdominal pain without nausea, vomiting, or recent fevers.  Patient had recent hospitalization at our facility.  No abdominal surgical history.     Nursing Notes were all reviewed and agreed with or any disagreements were addressed in the HPI.     REVIEW OF SYSTEMS        Positives and Pertinent negatives as per HPI.    PAST HISTORY     Past Medical History:  Past Medical History:   Diagnosis Date    Basal cell cancer 2012    [facility] Dr.Cavanaugh    CAD (coronary artery disease)     lipids    Cancer (HCC) 1996    Breast    Diabetes (HCC)     Diverticulosis      Hypercholesterolemia     Hypertension     Ill-defined condition     blood transfusion hx with both knee reolacement--autologous    Nausea & vomiting     Obesity     Sleep apnea     C PAP       Past Surgical History:  Past Surgical History:   Procedure Laterality Date    BREAST BIOPSY  1996    left    CARDIAC PROCEDURE N/A 2025    Left heart cath / coronary angiography performed by Bib Meek MD at [facility] CARDIAC CATH LAB    CATARACT REMOVAL      bilateral    GYN       O,ParaO    HEENT      scalp excision    HEENT      MOH's nose    INVASIVE VASCULAR N/A 2025    Ultrasound guided vascular access performed by Bib Meek MD at [facility] CARDIAC CATH LAB    TONSILLECTOMY      TOTAL KNEE ARTHROPLASTY  ,    bilateral       Family History:  Family History   Problem Relation Age of Onset    Heart Disease Father     Diabetes Mother     Cancer Brother         Colon       Social History:  Social History     Tobacco Use    Smoking status: Never    Smokeless tobacco: Never   Substance Use Topics    Alcohol use: No     Alcohol/week: 0.0 standard drinks of alcohol    Drug use: No       Allergies:  Allergies   Allergen Reactions    Ace Inhibitors      Other reaction(s): Unable to Obtain    Azithromycin Itching    Diphenhydramine Hives       CURRENT MEDICATIONS      Previous Medications    ASPIRIN 81 MG EC TABLET    Take 1 tablet by mouth daily    ATORVASTATIN (LIPITOR) 20 MG TABLET    Take 1 tablet by mouth daily    INSULIN NPH (HUMULIN N;NOVOLIN N) 100 UNIT/ML INJECTION VIAL    Inject 14 Units into the skin daily for 3 doses    INSULIN PEN NEEDLE (PEN NEEDLES) 30G X 5 MM MISC    1 each by Does not apply route once a week    INSULIN SYRINGE-NEEDLE U-100 ([employer] INSULIN SYR 1CC/30G) 30G X /16\" 1 ML MISC    1 each by Does not apply route daily    LOSARTAN (COZAAR) 100 MG TABLET    TAKE 1 TABLET DAILY FOR PRESSURE    MECLIZINE (ANTIVERT) 25 MG TABLET    Take 1 tablet by mouth 3 times daily as

## 2025-01-25 NOTE — PROGRESS NOTES
Comprehensive Nutrition Assessment    Type and Reason for Visit:  Initial, Consult    Nutrition Recommendations/Plan:   Initiate PPN:  Would start with 50g AA and 100g Dextrose, volume needed to maintain <900mOsm given peripheral line (provides 540 kcals)  Check TG level (ordered)  If TG <300 tomorrow, would start 250mL 20% SMOF lipids tomorrow (provides additional 500 kcals daily)   Sunday if BG <200mg/dL and lytes WNL, would increase AA to 75g and Dextrose to 200g (provides 1480kcals), volume needed to maintain <900mOsm given peripheral line     Malnutrition Assessment:  Malnutrition Status:  Insufficient data (01/25/25 1134)    Context:  Acute Illness     Findings of the 6 clinical characteristics of malnutrition:  Energy Intake:  Unable to assess  Weight Loss:  No weight loss     Body Fat Loss:  Unable to assess     Muscle Mass Loss:  Unable to assess    Fluid Accumulation:  No fluid accumulation     Strength:  Not Performed    Nutrition Assessment:  Pt admitted with perforated gastric ulcer.  PMH: DM, HTN, CAD, recently Dced.  Consult received, chart reviewed.  Pt disoriented x 3.  Recently Dced and determined to be well nourished at that time.  She is s/p lap dayana patch repair, will likely be NPO x 5 days before leak test can be performed.  Only peripheral lines noted, plans to start PPN tonight.  -327, diabetes team is consulted.  K 5.0 (hemolyzed) Phos and Mag WNL.  NGT in place for decompression.  TG elevated in the past, will check level tomorrow and add lipids if TG <300.  See PPN recommendations above.  Wt appears elevated.      Wt Readings from Last 10 Encounters:   01/22/25 78.1 kg (172 lb 2.9 oz)   10/28/24 73.6 kg (162 lb 3.2 oz)   08/14/24 76 kg (167 lb 9.6 oz)   06/20/24 72.6 kg (160 lb)   01/18/24 73.9 kg (163 lb)   01/15/24 76.6 kg (168 lb 12.8 oz)   07/10/23 75.1 kg (165 lb 9.6 oz)   01/19/23 73.9 kg (163 lb)   01/09/23 74.3 kg (163 lb 12.8 oz)   11/17/22 74.8 kg (165 lb)

## 2025-01-25 NOTE — ANESTHESIA PRE PROCEDURE
Department of Anesthesiology  Preprocedure Note       Name:  Katey Sterling   Age:  84 y.o.  :  1940                                          MRN:  [id number]         Date:  2025      Surgeon: Surgeon(s):  Angie Adams MD    Procedure: Procedure(s):  LAPAROSCOPY DIAGNOSTIC    Medications prior to admission:   Prior to Admission medications    Medication Sig Start Date End Date Taking? Authorizing Provider   predniSONE (DELTASONE) 20 MG tablet Take 2 tablets by mouth daily for 3 doses 25  Osito Vaughan MD   insulin NPH (HUMULIN N;NOVOLIN N) 100 UNIT/ML injection vial Inject 14 Units into the skin daily for 3 doses 25  Osito Vaughan MD   metoprolol tartrate (LOPRESSOR) 25 MG tablet Take 0.5 tablets by mouth 2 times daily 25   Osito Vaughan MD   NIFEdipine (PROCARDIA XL) 30 MG extended release tablet Take 1 tablet by mouth daily 25   Osito Vaughan MD   Insulin Syringe-Needle U-100 ([employer] INSULIN SYR 1CC/30G) 30G X \" 1 ML MISC 1 each by Does not apply route daily 25   Meena Nash, APRN - CNS   metFORMIN (GLUCOPHAGE-XR) 500 MG extended release tablet TAKE 2 TABLETS BY MOUTH TWICE DAILY FOR DIABETES 24   Catalino Gaines MD   losartan (COZAAR) 100 MG tablet TAKE 1 TABLET DAILY FOR PRESSURE 24   Catalino Gaines MD   meclizine (ANTIVERT) 25 MG tablet Take 1 tablet by mouth 3 times daily as needed for Dizziness 24   Catalino Gaines MD   aspirin 81 MG EC tablet Take 1 tablet by mouth daily 10/28/24   Catalino Gaines MD   nitroGLYCERIN (NITROSTAT) 0.4 MG SL tablet Place 1 tablet under the tongue every 5 minutes as needed for Chest pain up to max of 3 total doses. If no relief after 1 dose, call 911. 10/28/24   Catalino Gaines MD   mometasone (ELOCON) 0.1 % cream Apply topically in thin coat twice daily as needed itching bites. 24   Catalino Gaines MD   Semaglutide,0.25 or 0.5MG/DOS, 2 MG/3ML SOPN

## 2025-01-25 NOTE — ED NOTES
Bedside shift change report given to Sonya (oncoming nurse) by Patricia (offgoing nurse). Report included the following information Nurse Handoff Report, ED Encounter Summary, ED SBAR, Adult Overview, Surgery Report, Intake/Output, MAR, Recent Results, Cardiac Rhythm Sinus Rhythm, and Neuro Assessment. Pt has upper dentures in and bilateral hearing aids in.

## 2025-01-25 NOTE — PROGRESS NOTES
0230 TRANSFER - IN REPORT:    Bedside report received from ARAVIND Gutierrez and KAILA Hilario on Katey Sterling  being received from OR for routine post-op      Report consisted of patient's Situation, Background, Assessment and   Recommendations(SBAR).     Information from the following report(s) Nurse Handoff Report, Adult Overview, Surgery Report, Intake/Output, MAR, Recent Results, and Cardiac Rhythm SR  was reviewed with the receiving nurse.    Opportunity for questions and clarification was provided.  MD Angie at bedside. Informed RN that NGT is to remain in place 4-5 days to medium CWS, patient to remain NPO, and TPN to be started as early as possible. Patient with 2 DANG drains to RLQ. Family was updated by surgeon on surgical outcome.    0245 Assessment completed upon patient's arrival to unit and care assumed. Dual skin assessment performed with ARAVIND Bustos. No impairment noted other than surgical sites.    0400 First blood culture set drawn by ARAVIND Barahona on L Antecubital on 1/25/2025 at 0400. Sterile gloves applied. The venipuncture site was cleaned with CHG: ChloraPrep. The site was allowed to air dry.     Second blood culture set drawn by ARAVIND Barahona on R Antecubital on 1/25/2025 at 0405. Sterile gloves applied. The venipuncture site was cleaned with CHG: ChloraPrep. The site was allowed to air dry.     0700 Bedside shift change report given to ARAVIND Guillen (oncoming nurse) by ARAVIND Barahona (offgoing nurse). Report included the following information Nurse Handoff Report, Adult Overview, Intake/Output, MAR, Recent Results, and Cardiac Rhythm SR-ST .

## 2025-01-25 NOTE — PROGRESS NOTES
Acute Care Surgery Progress Note    Admit Date: 1/24/2025    POD 1 Day Post-Op    Procedure: Procedure(s):  LAPAROSCOPIC RADHA PATCH AND ESOPHAGOGASTRODUODENOSCOPY    Patient is s/p above for perforated antral ulcer.    Subjective/24 hour events:    Doing well.   No pressors.  Thirsty.  NGT light bile.     Objective:    VSS Afebrile.    Vitals:    01/25/25 1201 01/25/25 1301 01/25/25 1401 01/25/25 1501   BP: (!) 145/67 (!) 148/55 (!) 145/55 (!) 154/62   Pulse: 99 100 100 (!) 110   Resp: 16 19 17 22   Temp: 98 °F (36.7 °C)      TempSrc: Axillary      SpO2: 94% 91% 92% 92%   Height:            General: Well-appearing, no acute distress  HEENT: Extraocular muscles intact, trachea midline, normal range of motion  Lungs: Normal work of breathing, nontachypneic  Heart: Regular rate and rhythm  Abdomen: Soft, nondistended, appropriately tender, DANG drains clear  Extremities: Warm, well-perfused  Neuro: Grossly intact  Psych: Appropriate       Labs:    CBC:   Lab Results   Component Value Date/Time    WBC 30.0 01/25/2025 03:51 AM    RBC 3.35 01/25/2025 03:51 AM    HGB 10.3 01/25/2025 03:51 AM    HCT 32.3 01/25/2025 03:51 AM    MCV 96.4 01/25/2025 03:51 AM    MCH 30.7 01/25/2025 03:51 AM    MCHC 31.9 01/25/2025 03:51 AM    RDW 14.8 01/25/2025 03:51 AM     01/25/2025 03:51 AM    MPV 12.3 01/25/2025 03:51 AM     BMP:    Lab Results   Component Value Date/Time     01/25/2025 03:51 AM    K 5.0 01/25/2025 03:51 AM     01/25/2025 03:51 AM    CO2 19 01/25/2025 03:51 AM    BUN 17 01/25/2025 03:51 AM    CREATININE 0.71 01/25/2025 03:51 AM    CALCIUM 8.9 01/25/2025 03:51 AM    GFRAA >60 09/21/2022 12:28 PM    LABGLOM 84 01/25/2025 03:51 AM    LABGLOM >60 01/15/2024 02:39 PM    LABGLOM >60 01/09/2023 01:20 PM    GLUCOSE 299 01/25/2025 03:51 AM       Body mass index is 31.49 kg/m².       Assessment/Plan:    Leukocytosis anticipated. Recovering as expected.     Parenteral nutrition  Total fluids not to exceed 125  UGI

## 2025-01-25 NOTE — H&P
CRITICAL CARE NOTE    Name: Katey Sterling   : 1940   MRN: [id number]   Date: 2025      REASON FOR ICU ADMISSION:  Perforated peptic ulcer c/b peritonitis     PRINCIPAL ICU DIAGNOSIS     Perforated peptic ulcer c/b peritonitis     BRIEF PATIENT SUMMARY     Katey Sterling is an 83 yo female with a PMH of recently diagnosed meningioma (Melisi), CAD, IDDM2, HTN, and HLD who presented to the ED at [facility] with perforated gastric ulcer.  She had just been discharged from [facility] on  after being treated for possible CNS infection (LP negative).    On , she presented from her SNF as a transfer from [place] with abdominal pain and constipation. CT showed gastric perforation with pneumoperitoneum.  She was transferred to [facility] and taken to the OR for laparoscopic repair of the perforation with Andi patch.  She was transferred to the ICU follow OR and was extubated to NC.  She has been started on zosyn.    Medication Details Provider Last Reconciliation Status   predniSONE (DELTASONE) 20 MG tablet Take 2 tablets by mouth daily for 3 doses Osito Vaughna MD Needs Review   insulin NPH (HUMULIN N;NOVOLIN N) 100 UNIT/ML injection vial Inject 14 Units into the skin daily for 3 doses Osito Vaughan MD Needs Review   metoprolol tartrate (LOPRESSOR) 25 MG tablet Take 0.5 tablets by mouth 2 times daily Osito Vaughan MD Needs Review   NIFEdipine (PROCARDIA XL) 30 MG extended release tablet Take 1 tablet by mouth daily Osito Vaughan MD Needs Review   Insulin Syringe-Needle U-100 ([employer] INSULIN SYR 1CC/30G) 30G X \" 1 ML MISC 1 each by Does not apply route daily Meena Nash APRN - CNS Needs Review   metFORMIN (GLUCOPHAGE-XR) 500 MG extended release tablet TAKE 2 TABLETS BY MOUTH TWICE DAILY FOR DIABETES Catalino Gaines MD Needs Review   losartan (COZAAR) 100 MG tablet TAKE 1 TABLET DAILY FOR PRESSURE Catalino Gaines MD Needs Review   meclizine (ANTIVERT) 25 MG tablet Take 1 tablet by

## 2025-01-25 NOTE — PROGRESS NOTES
Bedside and Verbal shift change report given to sammy (oncoming nurse) by bishnu (offgoing nurse). Report included the following information Nurse Handoff Report, Index, Surgery Report, Intake/Output, Quality Measures, and Neuro Assessment.     0845 applied SCDs    0958 BS is high and has been high for past couple checks asked about starting some insulin    1106 removed carlisle    Per Moustafa patient can have ice chips    1700 bladder scan was 200 ml

## 2025-01-25 NOTE — PROGRESS NOTES
Pharmacy TPN Management Note    TPN indication: Perforated gastric ulcer, extended NPO    TPN type: Peripheral    Macronutrients:  Protein: 50g  Dextrose: 100g  Lipids: N/A    Rate: 58.3 mL/hr    Labs:  Recent Labs     Units 25  0351 25  2150   NA mmol/L 139 142   K mmol/L 5.0 3.8   CL mmol/L 113* 113*   CO2 mmol/L 19* 23   MG mg/dL 2.2  --    PHOS MG/DL 3.9  --    BUN MG/DL 17 17     Recent Labs     Units 25  2150   AST U/L 9*   ALT U/L 26     Recent Labs     Units 25  0351   WBC K/uL 30.0*   HGB g/dL 10.3*   HCT % 32.3*       Electrolytes (dosed per LITER):  Na: 35 meq/L; K: 22 meq/L; Phos:15 mmol/L; M meq/L; Ca 4.5 meq/L    Other additives in TPN: multivitamins  and trace elements    Impression/Plan:   TPN macronutrient/rate changes: Start 60g AA and 100g Dextrose per nutrition recs  TPN electrolyte changes: New start  TPN insulin changes: None  Current mOsm is 847  Will potentially start SMOF lipids on  pending triglyceride level     Pharmacy will continue to monitor enteral nutrition plan, electrolytes, renal function, and dietician recommendations and adjust parenteral nutrition as needed.    Thank you,  Dash Massey [facility]

## 2025-01-25 NOTE — ANESTHESIA POSTPROCEDURE EVALUATION
Department of Anesthesiology  Postprocedure Note    Patient: Katey Sterling  MRN: [id number]  YOB: 1940  Date of evaluation: 1/25/2025    Procedure Summary       Date: 01/25/25 Room / Location: [facility] MAIN OR  / [facility] MAIN OR    Anesthesia Start: 0007 Anesthesia Stop: 0243    Procedure: LAPAROSCOPIC RADHA PATCH AND ESOPHAGOGASTRODUODENOSCOPY (Abdomen) Diagnosis:       Perforated bowel (HCC)      (Perforated bowel (HCC) [K63.1])    Providers: Angie Adams MD Responsible Provider: Janae Menon MD    Anesthesia Type: General ASA Status: 3 - Emergent            Anesthesia Type: General    Amita Phase I:      Amita Phase II:      Anesthesia Post Evaluation    Patient location during evaluation: bedside  Patient participation: complete - patient participated  Level of consciousness: awake and alert  Pain scale: Controlled per protocol.  Airway patency: patent  Nausea & Vomiting: no nausea and no vomiting  Cardiovascular status: hemodynamically stable  Respiratory status: acceptable  Hydration status: stable  Multimodal analgesia pain management approach  Pain management: adequate    No notable events documented.

## 2025-01-25 NOTE — CONSULTS
Acute Care Surgery Consult    Consulted by: ED  PCP: Catalino Gaines MD      Assessment:     84-year-old woman recently discharged from the hospital with cerebellopontine meningioma presenting with a perforated peptic ulcer.  She is peritonitic with early signs of sepsis.  Surgery is indicated on an emergent basis to reduce risk of sepsis progression, death.    She is a high risk operative candidate in the setting of advanced age, recent steroid use, numerous comorbidities, obesity.  Unfortunately, risks are even higher with nonoperative management given her problem.          Plan:     Place NG tube  Plan for laparoscopic possibly open repair of perforated peptic ulcer  Will need ICU postoperatively      HPI:      Katey Sterling is a 84 y.o. female who is seen in consultation for pneumoperitoneum.  She was recently discharged 10 days ago where she underwent workup for a meningioma which included a heart catheterization.  She has been undergoing workup for elective resection of this.  She presented to [facility] ER this evening with acute onset abdominal pain.  She was found to have a marked leukocytosis to 29K which is up from her most recent labs significantly.  CT scan demonstrated moderate pneumoperitoneum with significant inflammation and some fluid around the gastric antrum and duodenum concerning for perforated peptic ulcer.  She was transferred here for further management.  She has a host of comorbidities including hypertension, encephalopathy, recent respiratory failure, recent steroid use.    Review of Systems:    A 10 point review of systems was negative aside from what is noted in the HPI.    Problem List:     Past Medical History:   Diagnosis Date    Basal cell cancer 2012    [facility] Dr.Cavanaugh    CAD (coronary artery disease)     lipids    Cancer (HCC) 1996    Breast    Diabetes (HCC)     Diverticulosis     Hypercholesterolemia     Hypertension     Ill-defined condition     blood

## 2025-01-26 LAB
ANION GAP SERPL CALC-SCNC: 5 MMOL/L (ref 2–12)
BUN SERPL-MCNC: 19 MG/DL (ref 6–20)
BUN/CREAT SERPL: 32 (ref 12–20)
CALCIUM SERPL-MCNC: 8.8 MG/DL (ref 8.5–10.1)
CHLORIDE SERPL-SCNC: 115 MMOL/L (ref 97–108)
CO2 SERPL-SCNC: 22 MMOL/L (ref 21–32)
CREAT SERPL-MCNC: 0.6 MG/DL (ref 0.55–1.02)
ERYTHROCYTE [DISTWIDTH] IN BLOOD BY AUTOMATED COUNT: 14.9 % (ref 11.5–14.5)
GLUCOSE BLD STRIP.AUTO-MCNC: 266 MG/DL (ref 65–117)
GLUCOSE BLD STRIP.AUTO-MCNC: 286 MG/DL (ref 65–117)
GLUCOSE BLD STRIP.AUTO-MCNC: 308 MG/DL (ref 65–117)
GLUCOSE SERPL-MCNC: 239 MG/DL (ref 65–100)
HCT VFR BLD AUTO: 24.9 % (ref 35–47)
HGB BLD-MCNC: 8.1 G/DL (ref 11.5–16)
MAGNESIUM SERPL-MCNC: 2.1 MG/DL (ref 1.6–2.4)
MCH RBC QN AUTO: 30.2 PG (ref 26–34)
MCHC RBC AUTO-ENTMCNC: 32.5 G/DL (ref 30–36.5)
MCV RBC AUTO: 92.9 FL (ref 80–99)
NRBC # BLD: 0 K/UL (ref 0–0.01)
NRBC BLD-RTO: 0 PER 100 WBC
PHOSPHATE SERPL-MCNC: 2.3 MG/DL (ref 2.6–4.7)
PLATELET # BLD AUTO: 284 K/UL (ref 150–400)
PMV BLD AUTO: 10.5 FL (ref 8.9–12.9)
POTASSIUM SERPL-SCNC: 3.8 MMOL/L (ref 3.5–5.1)
RBC # BLD AUTO: 2.68 M/UL (ref 3.8–5.2)
SERVICE CMNT-IMP: ABNORMAL
SODIUM SERPL-SCNC: 142 MMOL/L (ref 136–145)
TRIGL SERPL-MCNC: 128 MG/DL
WBC # BLD AUTO: 27.6 K/UL (ref 3.6–11)

## 2025-01-26 PROCEDURE — 80048 BASIC METABOLIC PNL TOTAL CA: CPT

## 2025-01-26 PROCEDURE — 85027 COMPLETE CBC AUTOMATED: CPT

## 2025-01-26 PROCEDURE — 6360000002 HC RX W HCPCS: Performed by: INTERNAL MEDICINE

## 2025-01-26 PROCEDURE — 2580000003 HC RX 258: Performed by: INTERNAL MEDICINE

## 2025-01-26 PROCEDURE — 2500000003 HC RX 250 WO HCPCS: Performed by: INTERNAL MEDICINE

## 2025-01-26 PROCEDURE — 6370000000 HC RX 637 (ALT 250 FOR IP): Performed by: INTERNAL MEDICINE

## 2025-01-26 PROCEDURE — 6360000002 HC RX W HCPCS: Performed by: NURSE PRACTITIONER

## 2025-01-26 PROCEDURE — 6360000002 HC RX W HCPCS: Performed by: EMERGENCY MEDICINE

## 2025-01-26 PROCEDURE — 2500000003 HC RX 250 WO HCPCS: Performed by: NURSE PRACTITIONER

## 2025-01-26 PROCEDURE — 82962 GLUCOSE BLOOD TEST: CPT

## 2025-01-26 PROCEDURE — 84100 ASSAY OF PHOSPHORUS: CPT

## 2025-01-26 PROCEDURE — 83735 ASSAY OF MAGNESIUM: CPT

## 2025-01-26 PROCEDURE — 36415 COLL VENOUS BLD VENIPUNCTURE: CPT

## 2025-01-26 PROCEDURE — 2580000003 HC RX 258: Performed by: NURSE PRACTITIONER

## 2025-01-26 PROCEDURE — 84478 ASSAY OF TRIGLYCERIDES: CPT

## 2025-01-26 PROCEDURE — 2060000000 HC ICU INTERMEDIATE R&B

## 2025-01-26 RX ADMIN — SODIUM CHLORIDE, PRESERVATIVE FREE 10 ML: 5 INJECTION INTRAVENOUS at 20:23

## 2025-01-26 RX ADMIN — PIPERACILLIN AND TAZOBACTAM 3375 MG: 3; .375 INJECTION, POWDER, LYOPHILIZED, FOR SOLUTION INTRAVENOUS at 01:34

## 2025-01-26 RX ADMIN — PIPERACILLIN AND TAZOBACTAM 3375 MG: 3; .375 INJECTION, POWDER, LYOPHILIZED, FOR SOLUTION INTRAVENOUS at 09:40

## 2025-01-26 RX ADMIN — INSULIN LISPRO 4 UNITS: 100 INJECTION, SOLUTION INTRAVENOUS; SUBCUTANEOUS at 04:49

## 2025-01-26 RX ADMIN — Medication 1 AMPULE: at 20:23

## 2025-01-26 RX ADMIN — INSULIN LISPRO 4 UNITS: 100 INJECTION, SOLUTION INTRAVENOUS; SUBCUTANEOUS at 22:15

## 2025-01-26 RX ADMIN — PIPERACILLIN AND TAZOBACTAM 3375 MG: 3; .375 INJECTION, POWDER, LYOPHILIZED, FOR SOLUTION INTRAVENOUS at 18:31

## 2025-01-26 RX ADMIN — MORPHINE SULFATE 4 MG: 4 INJECTION, SOLUTION INTRAMUSCULAR; INTRAVENOUS at 18:46

## 2025-01-26 RX ADMIN — SMOFLIPID 250 ML: 6; 6; 5; 3 INJECTION, EMULSION INTRAVENOUS at 18:37

## 2025-01-26 RX ADMIN — PANTOPRAZOLE SODIUM 40 MG: 40 INJECTION, POWDER, LYOPHILIZED, FOR SOLUTION INTRAVENOUS at 09:34

## 2025-01-26 RX ADMIN — SODIUM CHLORIDE, PRESERVATIVE FREE 10 ML: 5 INJECTION INTRAVENOUS at 09:34

## 2025-01-26 RX ADMIN — INSULIN LISPRO 6 UNITS: 100 INJECTION, SOLUTION INTRAVENOUS; SUBCUTANEOUS at 16:55

## 2025-01-26 RX ADMIN — HEPARIN SODIUM 5000 UNITS: 5000 INJECTION INTRAVENOUS; SUBCUTANEOUS at 20:28

## 2025-01-26 RX ADMIN — INSULIN HUMAN 11 UNITS: 100 INJECTION, SUSPENSION SUBCUTANEOUS at 20:25

## 2025-01-26 RX ADMIN — Medication 1 AMPULE: at 11:32

## 2025-01-26 RX ADMIN — INSULIN HUMAN 11 UNITS: 100 INJECTION, SUSPENSION SUBCUTANEOUS at 09:34

## 2025-01-26 RX ADMIN — HEPARIN SODIUM 5000 UNITS: 5000 INJECTION INTRAVENOUS; SUBCUTANEOUS at 05:49

## 2025-01-26 RX ADMIN — FLUCONAZOLE 400 MG: 2 INJECTION, SOLUTION INTRAVENOUS at 05:49

## 2025-01-26 RX ADMIN — HEPARIN SODIUM 5000 UNITS: 5000 INJECTION INTRAVENOUS; SUBCUTANEOUS at 15:04

## 2025-01-26 RX ADMIN — CALCIUM GLUCONATE: 98 INJECTION, SOLUTION INTRAVENOUS at 18:42

## 2025-01-26 RX ADMIN — SODIUM PHOSPHATE, MONOBASIC, MONOHYDRATE AND SODIUM PHOSPHATE, DIBASIC, ANHYDROUS 20 MMOL: 276; 142 INJECTION, SOLUTION INTRAVENOUS at 11:36

## 2025-01-26 ASSESSMENT — PAIN DESCRIPTION - DESCRIPTORS: DESCRIPTORS: SORE

## 2025-01-26 ASSESSMENT — PAIN - FUNCTIONAL ASSESSMENT: PAIN_FUNCTIONAL_ASSESSMENT: ACTIVITIES ARE NOT PREVENTED

## 2025-01-26 ASSESSMENT — PAIN SCALES - GENERAL
PAINLEVEL_OUTOF10: 7
PAINLEVEL_OUTOF10: 0

## 2025-01-26 ASSESSMENT — PAIN DESCRIPTION - LOCATION: LOCATION: ABDOMEN

## 2025-01-26 NOTE — PROGRESS NOTES
Pharmacy TPN Management Note    TPN indication: Perforated gastric ulcer, extended NPO    TPN type: Peripheral    Macronutrients:  Protein: 50g  Dextrose: 100g  Lipids: 250mL 20% SMOF lipids every other day    Rate: 58.3 mL/hr    Labs:  Recent Labs     Units 25  0508 25  0351 25  2150   NA mmol/L 142 139 142   K mmol/L 3.8 5.0 3.8   CL mmol/L 115* 113* 113*   CO2 mmol/L 22 19* 23   MG mg/dL 2.1 2.2  --    PHOS MG/DL 2.3* 3.9  --    BUN MG/DL 19 17 17     Recent Labs     Units 25  2150   AST U/L 9*   ALT U/L 26     Recent Labs     Units 25  0508   WBC K/uL 27.6*   HGB g/dL 8.1*   HCT % 24.9*   TRIG MG/       Electrolytes (dosed per LITER):  Na: 35 meq/L; K: 30 meq/L; Phos:15 mmol/L; M meq/L; Ca 4.5 meq/L    Other additives in TPN: multivitamins  and trace elements    Impression/Plan:   TPN macronutrient/rate changes: TG less than 300, will start 250ml 20% SMOF lipids daily. Hold off on advancing other macros as BG was over 200 and phos was low.  TPN electrolyte changes: 20mmol of sodium phos given outside of bag. Will increase K to 30 mEq/L.   TPN insulin changes: None  Current mOsm is 847     Pharmacy will continue to monitor enteral nutrition plan, electrolytes, renal function, and dietician recommendations and adjust parenteral nutrition as needed.    Thank you,  Dash Massey [facility]

## 2025-01-26 NOTE — CARE COORDINATION
Care Management Initial Assessment       RUR: 21% \"high risk\"  Readmission? Yes, from 1/13/25-1/23/25  1st IM letter given? Yes, given by Patient Access Team on 1/25/25  1st  letter given: N/A    Initial note - 1525 PM: Chart reviewed. CM met with pt spouse to introduce self and role. Verified contact information and demographics. Pt resides with pt spouse in a one level home with a level entry at [address] [place] [id number]. Pt PCP is Dr. Gaines with the last visit being within the last six months. Preferred pharmacy is [facility] in [place], [place]. Pt is current with [facility] services. Pt has no hx of a SNF stay. Pt is independent with ADL's and has no DME needs. Pt has a walker at home. Pt has an ACP on file; pt is a FULL code.     Pt spouse provided CM choices for short term rehab placement as pt spouse noted it was discussed with pt yesterday. CM to send SNF referrals. Full assessment below:     01/26/25 5633   Service Assessment   Patient Orientation Alert and Oriented   Cognition Alert   History Provided By Spouse   Primary Caregiver Self   Support Systems Spouse/Significant Other   Patient's Healthcare Decision Maker is: Named in Scanned ACP Document   PCP Verified by CM Yes  (Dr. Gaines)   Last Visit to PCP Within last 6 months   Prior Functional Level Independent in ADLs/IADLs   Current Functional Level Assistance with the following:;Toileting;Dressing;Bathing   Can patient return to prior living arrangement Yes   Ability to make needs known: Good   Family able to assist with home care needs: Yes   Would you like for me to discuss the discharge plan with any other family members/significant others, and if so, who? Yes  (Edvelia Sterling (spouse), [phone])   Financial Resources Medicare  (Medic[place], [place]   Community Resources None   Social/Functional History   Lives With Spouse   Type of Home House   Home Layout One level   Home Access Level entry   Home Equipment Walker -  Not applicable

## 2025-01-26 NOTE — CONSULTS
Hospitalist Admission Note    NAME:   Katey Sterling   : 1940   MRN: [id number]     Date/Time: 2025 8:15 PM    Patient PCP: Catalino Gaines MD    ______________________________________________________________________  Given the patient's current clinical presentation, I have a high level of concern for decompensation if discharged from the emergency department.  Complex decision making was performed, which includes reviewing the patient's available past medical records, laboratory results, and x-ray films.       My assessment of this patient's clinical condition and my plan of care is as follows.    Assessment / Plan:    Perforated gastric ulcer status post Andi patch  -Status post laparoscopic Andi patch and is EGD on   -General Surgery is planning on  and surgery following  -Continue TPN  -Postoperative care per general surgery  -Continue Zosyn and fluconazole    Status post extubation postoperatively.  Nasal cannula  -She is currently on room air    History of recent admission to the hospital for acute encephalopathy, postprocedural fever and also severe sepsis  -Status post completion of antibiotics      History history of left cerebellopontine angle meningioma  Hypertension  History of coronary artery disease status post recent  -She is currently n.p.o.    Diabetes mellitus  -Currently blood sugars are elevated.  Increase insulin NPH to 11 units twice daily.  Start medium sliding scale insulin with blood sugar checks.                  Medical Decision Making:   I personally reviewed labs: CBC, BMP  I personally reviewed imaging: CT abdomen  I personally reviewed EKG:  Toxic drug monitoring:   Discussed case with: ED provider. After discussion I am in agreement that acuity of patient's medical condition necessitates hospital stay.      Code Status: Full code  DVT Prophylaxis: SCDs due to anticipated procedures  Baseline:     Subjective:   CHIEF COMPLAINT: Abdominal  be a fluid-filled diverticulum. Developing abscess/phlegmon is a consideration. Malignant lesion less likely but not entirely excluded. 4.   Cholelithiasis. No acute cholecystitis. 5.   Pancreas ductal dilatation upper normal for age. Concomitant acinar side chain dilatation and uncinate process cysts measuring up to 1.7 cm likely benign. Follow-up if any should be based on clinical grounds. 6.   Uterine fibroids. Possible right adnexal 1.6 cm cystic lesion or unopacified loop of small bowel axial series 3, image 374.  This is of dubious clinical significance. 7.   Small bilateral pleural effusions. 8.   Left adrenal subcentimeter adenoma. No imaging follow-up for this suggested. 9.   Right gluteus muscles 5.5 cm lipoma. 10.   Calcified right hilar lymph node and right lung granuloma may be a manifestation of healed tuberculosis. COMMENTS: 1.   THIS REPORT CONTAINS FINDINGS THAT MAY BE CRITICAL TO PATIENT CARE. The findings were verbally communicated via telephone conference with FINA JACKSON at 6:20 PM EST on 1/24/2025 who subsequently communicates patient was on steroids. The findings were acknowledged and understood 2.   Consistent with the American College of Radiology's Incidental Findings   Committee white paper (J Am Swapnil Radiol 2017): Any incidental adrenal lesion less than 1 cm is likely benign. No follow-up imaging is recommended for these lesions per consensus recommendations based on imaging criteria. Further lab evaluation could be pursued if warranted based on clinical findings. THIS DOCUMENT HAS BEEN ELECTRONICALLY VERIFIED BY LENA MCLEAN MD ON 01/24/2025 18:30:06    XR CHEST 1 VIEW    Result Date: 1/24/2025  PROCEDURE INFORMATION: Exam: XR Chest Exam date and time: 1/24/2025 4:33 PM Age: 84 years old Clinical indication: Other: Upper abd pain TECHNIQUE: Imaging protocol: Radiologic exam of the chest. Views: 1 view. Total images: 3 COMPARISON: DX XR CHEST 2 VIEWS 12/19/2024 2:22 PM FINDINGS:  contrast IV, with thin section axial Chest CT obtained and 3D image post processing performed including coronal MIPS. CT dose reduction was achieved through use of a standardized protocol tailored for this examination and automatic exposure control for dose modulation. FINDINGS: This is a good quality study for the evaluation of pulmonary embolism to the first subsegmental arterial level. There is no pulmonary embolism to this level. There is no right heart strain. There is no apparent aortic dissection or aneurysm.  Lungs show no pneumonia. Lungs are expiratory with mild bibasilar atelectasis. There are 2 prominent calcified benign granulomas in the right lung. There is no pneumothorax. There is no pleural effusion. There is no significant adenopathy.     1. No pulmonary emboli. 2. No acute finding. Electronically signed by KAREN WEIR    XR CHEST PORTABLE    Result Date: 1/14/2025  EXAM:  XR CHEST PORTABLE INDICATION: Change in mentation COMPARISON: None TECHNIQUE: Portable AP semiupright chest view at 2025 hours FINDINGS: The cardiac silhouette is within normal limits. The pulmonary vasculature is within normal limits. Right lung calcified granuloma are noted. The lungs and pleural spaces are otherwise clear. There is no pneumothorax. The visualized bones and upper abdomen are age-appropriate.     No acute process. Electronically signed by Tomasz Cox    CT HEAD WO CONTRAST    Result Date: 1/13/2025  EXAM: CT CODE NEURO HEAD WO CONTRAST CLINICAL HISTORY: Altered mental status, weakness INDICATION: Altered mental status, weakness COMPARISON: 11/4/2024. CT dose reduction was achieved through use of a standardized protocol tailored for this examination and automatic exposure control for dose modulation. TECHNIQUE: Serial axial images with a collimation of 5 mm were obtained from the skull base through the vertex.  Serial axial images with a collimation of 5 mm were obtained from the skull base through the  vertex.  Sagittal and coronal reformatted images also obtained.  FINDINGS: Dural based extra-axial mass lesion at the left posterior cerebral fossa measures 26 x 16 x 21 mm in craniocaudal dimension lying along the inferior margin of the tentorium cerebelli. Posterior displacement of the cerebellum with no associated vasogenic edema. . Periventricular hypodensity.. Mild sulcal and ventricular prominence. Mucous retention cyst in the right maxillary sinus. Otherwise; There is no evidence of midline shift or mass-effect. The ventricles are normal in size, position and configuration.  There are no extra-axial fluid collections. The mastoid air cells and are well pneumatized and clear.     26 x 21 x 16 mm mass lesion anterior to the cerebellum on the left with effacement of the left cerebellum but no associated vasogenic edema is consistent with a meningioma. Minimal chronic microvascular change and mild cerebral atrophy. No additional intracranial mass, hemorrhage or evidence of acute infarction. . Electronically signed by VLAD SANTO    Cardiac procedure    Result Date: 1/13/2025    Please see the separately dictated report      _______________________________________________________________________    TOTAL TIME:  76 Minutes    Critical Care Provided     Minutes non procedure based    Signed: Osito Vaughan MD    Procedures: see electronic medical records for all procedures/Xrays and details which were not copied into this note but were reviewed prior to creation of Plan.

## 2025-01-26 NOTE — PROGRESS NOTES
Bedside shift report received from ARAVIND Shelby. Report included the following information Nurse Handoff Report, MAR, Telemetry status, Recent Results, and plan of care. This RN verbalized understanding of plan of care with opportunity for clarification and questions. Care of patient assumed at this time. Dual skin check performed at this time.     Bedside shift report given to  ____ RN. Report included the following information Nurse Handoff Report, MAR, Telemetry status, Recent Results, and plan of care. Oncoming RN verbalized understanding of plan of care with opportunity for clarification and questions. Dual skin check performed at this time.

## 2025-01-26 NOTE — PLAN OF CARE
Problem: Chronic Conditions and Co-morbidities  Goal: Patient's chronic conditions and co-morbidity symptoms are monitored and maintained or improved  Outcome: Progressing     Problem: Pain  Goal: Verbalizes/displays adequate comfort level or baseline comfort level  Outcome: Progressing     Problem: Safety - Adult  Goal: Free from fall injury  Outcome: Progressing     Problem: Discharge Planning  Goal: Discharge to home or other facility with appropriate resources  Outcome: Progressing     Problem: Skin/Tissue Integrity  Goal: Skin integrity remains intact  Description: 1.  Monitor for areas of redness and/or skin breakdown  2.  Assess vascular access sites hourly  3.  Every 4-6 hours minimum:  Change oxygen saturation probe site  4.  Every 4-6 hours:  If on nasal continuous positive airway pressure, respiratory therapy assess nares and determine need for appliance change or resting period  Outcome: Progressing

## 2025-01-27 ENCOUNTER — TELEPHONE (OUTPATIENT)
Age: 85
End: 2025-01-27

## 2025-01-27 PROBLEM — K66.8 PNEUMOPERITONEUM: Status: ACTIVE | Noted: 2025-01-27

## 2025-01-27 LAB
ANION GAP SERPL CALC-SCNC: 4 MMOL/L (ref 2–12)
BUN SERPL-MCNC: 15 MG/DL (ref 6–20)
BUN/CREAT SERPL: 26 (ref 12–20)
CALCIUM SERPL-MCNC: 9 MG/DL (ref 8.5–10.1)
CHLORIDE SERPL-SCNC: 116 MMOL/L (ref 97–108)
CO2 SERPL-SCNC: 25 MMOL/L (ref 21–32)
CREAT SERPL-MCNC: 0.58 MG/DL (ref 0.55–1.02)
ERYTHROCYTE [DISTWIDTH] IN BLOOD BY AUTOMATED COUNT: 15.1 % (ref 11.5–14.5)
GLUCOSE BLD STRIP.AUTO-MCNC: 268 MG/DL (ref 65–117)
GLUCOSE BLD STRIP.AUTO-MCNC: 292 MG/DL (ref 65–117)
GLUCOSE BLD STRIP.AUTO-MCNC: 309 MG/DL (ref 65–117)
GLUCOSE BLD STRIP.AUTO-MCNC: 331 MG/DL (ref 65–117)
GLUCOSE SERPL-MCNC: 261 MG/DL (ref 65–100)
HCT VFR BLD AUTO: 24.1 % (ref 35–47)
HGB BLD-MCNC: 7.8 G/DL (ref 11.5–16)
MAGNESIUM SERPL-MCNC: 2 MG/DL (ref 1.6–2.4)
MCH RBC QN AUTO: 29.5 PG (ref 26–34)
MCHC RBC AUTO-ENTMCNC: 32.4 G/DL (ref 30–36.5)
MCV RBC AUTO: 91.3 FL (ref 80–99)
NRBC # BLD: 0 K/UL (ref 0–0.01)
NRBC BLD-RTO: 0 PER 100 WBC
PHOSPHATE SERPL-MCNC: 2.4 MG/DL (ref 2.6–4.7)
PLATELET # BLD AUTO: 276 K/UL (ref 150–400)
PMV BLD AUTO: 10.4 FL (ref 8.9–12.9)
POTASSIUM SERPL-SCNC: 3.4 MMOL/L (ref 3.5–5.1)
RBC # BLD AUTO: 2.64 M/UL (ref 3.8–5.2)
SERVICE CMNT-IMP: ABNORMAL
SODIUM SERPL-SCNC: 145 MMOL/L (ref 136–145)
WBC # BLD AUTO: 19.5 K/UL (ref 3.6–11)

## 2025-01-27 PROCEDURE — 2060000000 HC ICU INTERMEDIATE R&B

## 2025-01-27 PROCEDURE — 83735 ASSAY OF MAGNESIUM: CPT

## 2025-01-27 PROCEDURE — 6370000000 HC RX 637 (ALT 250 FOR IP): Performed by: INTERNAL MEDICINE

## 2025-01-27 PROCEDURE — 97535 SELF CARE MNGMENT TRAINING: CPT

## 2025-01-27 PROCEDURE — 2500000003 HC RX 250 WO HCPCS: Performed by: NURSE PRACTITIONER

## 2025-01-27 PROCEDURE — 99221 1ST HOSP IP/OBS SF/LOW 40: CPT

## 2025-01-27 PROCEDURE — 2580000003 HC RX 258: Performed by: INTERNAL MEDICINE

## 2025-01-27 PROCEDURE — 6360000002 HC RX W HCPCS: Performed by: NURSE PRACTITIONER

## 2025-01-27 PROCEDURE — 84100 ASSAY OF PHOSPHORUS: CPT

## 2025-01-27 PROCEDURE — 85027 COMPLETE CBC AUTOMATED: CPT

## 2025-01-27 PROCEDURE — 80048 BASIC METABOLIC PNL TOTAL CA: CPT

## 2025-01-27 PROCEDURE — 2580000003 HC RX 258: Performed by: NURSE PRACTITIONER

## 2025-01-27 PROCEDURE — 6360000002 HC RX W HCPCS: Performed by: INTERNAL MEDICINE

## 2025-01-27 PROCEDURE — 2500000003 HC RX 250 WO HCPCS: Performed by: INTERNAL MEDICINE

## 2025-01-27 PROCEDURE — 36415 COLL VENOUS BLD VENIPUNCTURE: CPT

## 2025-01-27 PROCEDURE — 97167 OT EVAL HIGH COMPLEX 60 MIN: CPT

## 2025-01-27 PROCEDURE — 82962 GLUCOSE BLOOD TEST: CPT

## 2025-01-27 PROCEDURE — 6370000000 HC RX 637 (ALT 250 FOR IP)

## 2025-01-27 PROCEDURE — 76937 US GUIDE VASCULAR ACCESS: CPT

## 2025-01-27 RX ORDER — POTASSIUM CHLORIDE 7.45 MG/ML
10 INJECTION INTRAVENOUS
Status: ACTIVE | OUTPATIENT
Start: 2025-01-27 | End: 2025-01-27

## 2025-01-27 RX ORDER — METOPROLOL TARTRATE 1 MG/ML
2.5 INJECTION, SOLUTION INTRAVENOUS EVERY 6 HOURS PRN
Status: DISCONTINUED | OUTPATIENT
Start: 2025-01-27 | End: 2025-01-30 | Stop reason: HOSPADM

## 2025-01-27 RX ADMIN — INSULIN LISPRO 6 UNITS: 100 INJECTION, SOLUTION INTRAVENOUS; SUBCUTANEOUS at 12:07

## 2025-01-27 RX ADMIN — INSULIN LISPRO 4 UNITS: 100 INJECTION, SOLUTION INTRAVENOUS; SUBCUTANEOUS at 22:17

## 2025-01-27 RX ADMIN — METOPROLOL TARTRATE 2.5 MG: 5 INJECTION INTRAVENOUS at 11:57

## 2025-01-27 RX ADMIN — SODIUM CHLORIDE, PRESERVATIVE FREE 10 ML: 5 INJECTION INTRAVENOUS at 08:10

## 2025-01-27 RX ADMIN — FLUCONAZOLE 400 MG: 2 INJECTION, SOLUTION INTRAVENOUS at 05:13

## 2025-01-27 RX ADMIN — SODIUM CHLORIDE, PRESERVATIVE FREE 10 ML: 5 INJECTION INTRAVENOUS at 20:16

## 2025-01-27 RX ADMIN — SODIUM PHOSPHATE, MONOBASIC, MONOHYDRATE AND SODIUM PHOSPHATE, DIBASIC, ANHYDROUS 15 MMOL: 276; 142 INJECTION, SOLUTION INTRAVENOUS at 11:57

## 2025-01-27 RX ADMIN — CALCIUM GLUCONATE: 98 INJECTION, SOLUTION INTRAVENOUS at 18:27

## 2025-01-27 RX ADMIN — PANTOPRAZOLE SODIUM 40 MG: 40 INJECTION, POWDER, LYOPHILIZED, FOR SOLUTION INTRAVENOUS at 08:09

## 2025-01-27 RX ADMIN — HEPARIN SODIUM 5000 UNITS: 5000 INJECTION INTRAVENOUS; SUBCUTANEOUS at 20:17

## 2025-01-27 RX ADMIN — HEPARIN SODIUM 5000 UNITS: 5000 INJECTION INTRAVENOUS; SUBCUTANEOUS at 14:41

## 2025-01-27 RX ADMIN — INSULIN HUMAN 15 UNITS: 100 INJECTION, SUSPENSION SUBCUTANEOUS at 20:16

## 2025-01-27 RX ADMIN — Medication 1 AMPULE: at 20:15

## 2025-01-27 RX ADMIN — INSULIN HUMAN 11 UNITS: 100 INJECTION, SUSPENSION SUBCUTANEOUS at 08:09

## 2025-01-27 RX ADMIN — INSULIN LISPRO 6 UNITS: 100 INJECTION, SOLUTION INTRAVENOUS; SUBCUTANEOUS at 16:09

## 2025-01-27 RX ADMIN — POTASSIUM CHLORIDE 10 MEQ: 7.46 INJECTION, SOLUTION INTRAVENOUS at 08:16

## 2025-01-27 RX ADMIN — POTASSIUM CHLORIDE 10 MEQ: 7.46 INJECTION, SOLUTION INTRAVENOUS at 07:09

## 2025-01-27 RX ADMIN — PIPERACILLIN AND TAZOBACTAM 3375 MG: 3; .375 INJECTION, POWDER, LYOPHILIZED, FOR SOLUTION INTRAVENOUS at 16:30

## 2025-01-27 RX ADMIN — POTASSIUM CHLORIDE 10 MEQ: 7.46 INJECTION, SOLUTION INTRAVENOUS at 09:50

## 2025-01-27 RX ADMIN — HEPARIN SODIUM 5000 UNITS: 5000 INJECTION INTRAVENOUS; SUBCUTANEOUS at 05:16

## 2025-01-27 RX ADMIN — INSULIN LISPRO 4 UNITS: 100 INJECTION, SOLUTION INTRAVENOUS; SUBCUTANEOUS at 04:12

## 2025-01-27 RX ADMIN — Medication 1 AMPULE: at 08:10

## 2025-01-27 RX ADMIN — SMOFLIPID 250 ML: 6; 6; 5; 3 INJECTION, EMULSION INTRAVENOUS at 18:24

## 2025-01-27 RX ADMIN — PIPERACILLIN AND TAZOBACTAM 3375 MG: 3; .375 INJECTION, POWDER, LYOPHILIZED, FOR SOLUTION INTRAVENOUS at 02:44

## 2025-01-27 ASSESSMENT — PAIN SCALES - GENERAL
PAINLEVEL_OUTOF10: 0

## 2025-01-27 NOTE — DIABETES MGMT
[facility]  [facility]ES HEALTH  DIABETES MANAGEMENT CONSULT    Consulted by Provider for advanced nursing evaluation and care for inpatient blood glucose management.    Evaluation and Action Plan   Katey Sterling is an 84 year old female patient with a hx of Type 2 diabetes. The patient reportedly takes weekly Ozempic at home. Her current A1c 7.2% demonstrates good BG control. The patient was previously admitted after complications of a scheduled cardiac catheterizations. The patient did not receive cardiac stents. While hospitalized she was receiving high dose of dexametahsone and diabetes management was consulted to assist with elevated Bg's. Using scheduled NPH diabetes management was able to gain control of BG's. The patient was discharged on 1/23/25 and was readmitted the next day due to complaints of abdominal pain and constipation. Possible bowel perforation and pancreatic mass( cerebellopontine meningioma). The patient was referred to general surgery and found to have a perforated peptic ulcer. Peritonitic with early signs of sepsis. Emergent surgery was indicated. Laparoscopic dayana patch and esophagogastroduodenoscopy performed. NGT in place. TPN started. BG's elevated. Low dose basal insulin. I suspect TPN is playing a role. Insulin to be pit in TPN bag tonight.     Blood glucose pattern        Management Rationale Action Plan   Medication   Basal needs Using 0.4 units/kg/D based on weight Use 15 units NPH q 12 hours    Nutritional needs Covers carb load in meals Using 1:10 ratio ( 1 units insulin fir 10 grams dextrose)   Use 10 units insulin in TPN bag  If Bg 's are elevated tomorrow increase to 1:8.    Corrective insulin Using medium dose sensitivity based on weight    Additional orders  Carb consistent diet (60g CHO/meal)       Diabetes Discharge Plan   Medication  TBD   Referral  []        Outpatient diabetes education   Additional orders            Initial Presentation   Katey Kim  Decision-making Support   Nursing Interventions Examined current inpatient diabetes/blood glucose control   Explored factors facilitating and impeding inpatient management  Explored corrective strategies with patient and responsible inpatient provider   Informed patient of rational for insulin strategy while hospitalized     Nursing Diagnosis 68464 Ineffective Health Management   Nursing Intervention Domain 5250 Decision-making Support   Nursing Interventions Identified diabetes self-management practices impeding diabetes control  Discussed diabetes survival skills related to  Healthy Plate eating plan; given handouts  Role of physical activity in improving insulin sensitivity and action  Procedure for blood glucose monitoring & options for low-cost products  Medications plan at discharge     Billing Code(s)   [] 28035 IP subsequent hospital care - 50 minutes   [] 54911 IP subsequent hospital care - 35 minutes   [] 26741 IP subsequent hospital care - 25 minutes   [x] 61012 IP initial hospital care - 40 minutes     Before making these care recommendations, I personally reviewed the hospitalization record, including notes, laboratory & diagnostic data and current medications, and examined the patient at the bedside.  Total minutes: 40 minutes    BRADLEY Hopper - CNS   Diabetes Clinical Nurse Specialist   [facility]  Access via MySiteApp

## 2025-01-27 NOTE — PROGRESS NOTES
End of Shift Note    Bedside shift change report given to Wesley (oncoming nurse) by Samantha Porras RN (offgoing nurse).  Report included the following information SBAR, Intake/Output, MAR, Recent Results, Cardiac Rhythm sinus tach, and Quality Measures    Shift worked:  Day shift [id number]     Shift summary and any significant changes:     No significant events this shift.     Concerns for physician to address:  none     Zone phone for oncoming shift:   [phone]       Activity:     Number times ambulated in hallways past shift: 0  Number of times OOB to chair past shift: 0    Cardiac:   Cardiac Monitoring: Yes      Cardiac Rhythm: Sinus tachy    Access:  Current line(s): PIV  16 G left AC and Dwelling right cephalic    Genitourinary:   Urinary Status: Voiding, External catheter    Respiratory:   O2 Device: None (Room air)  Chronic home O2 use?: NO  Incentive spirometer at bedside: NO    GI:  Last BM (including prior to admit):  (PTA)  Current diet:  PN-Adult 2-in-1 Peripheral Line (Custom)  Diet NPO  TPN-Adult 2-in-1 Peripheral Line (Custom)  Passing flatus: YES    Pain Management:   Patient states pain is manageable on current regimen: YES    Skin:  Carlton Scale Score: 18  Interventions: Wound Offloading (Prevention Methods): Bed, pressure reduction mattress, Turning, Repositioning, Pillows    Patient Safety:  Fall Risk: Nursing Judgement-Fall Risk High(Add Comments): Yes  Fall Risk Interventions  Nursing Judgement-Fall Risk High(Add Comments): Yes  Toilet Every 2 Hours-In Advance of Need: Yes  Hourly Visual Checks: Awake, In bed  Fall Visual Posted: Socks, Fall sign posted  Room Door Open: Deferred to promote rest  Alarm On: Bed, Chair  Patient Moved Closer to Nursing Station: No    Active Consults:   IP CONSULT TO PHARMACY  IP CONSULT TO DIETITIAN  IP CONSULT TO DIABETES MANAGEMENT  IP CONSULT TO PHARMACY    Length of Stay:  Expected LOS: 7  Actual LOS: 3    Samantha Porras, RN

## 2025-01-27 NOTE — PROGRESS NOTES
0700: bedside shift report received from night shift nurse. Dual skin completed.     0800: shift assessment completed.     1030: reassessment completed.     1200: NGT moved from 55 back to 75.     1500: reassessment completed. Sheets changed and pt wiped down.    1520: PICC team called.     1550: PICC team placed a new dwelling cath in the right upper arm.    1700: called pharmacy to adjust the zosyn administration.     1900: bedside shift report given to night shift nurse.

## 2025-01-27 NOTE — PROGRESS NOTES
Hospitalist Admission Note    NAME:   Katey Sterling   : 1940   MRN: [id number]     Date/Time: 2025 1:40 PM    Patient PCP: Catalino Gaines MD    ______________________________________________________________________  Given the patient's current clinical presentation, I have a high level of concern for decompensation if discharged from the emergency department.  Complex decision making was performed, which includes reviewing the patient's available past medical records, laboratory results, and x-ray films.       My assessment of this patient's clinical condition and my plan of care is as follows.    Assessment / Plan:    Perforated gastric ulcer status post Andi patch  -Status post laparoscopic Anid patch and is EGD on   -General Surgery following.  For UGI in a.m. tomorrow.  -Continue TPN.  Monitor electrolytes.  -Postoperative care per general surgery  -Continue Zosyn and fluconazole  -Check CBC and BMP in a.m. tomorrow    Hypokalemia  Hypophosphatemia  -Potassium and phosphorus replaced.  Recheck in a.m.    Status post extubation postoperatively to nasal cannula  -She is currently on room air    History of recent admission to the hospital for acute encephalopathy, postprocedural fever and also severe sepsis  -Status post completion of antibiotics      History history of left cerebellopontine angle meningioma  Hypertension  History of coronary artery disease status post recent  -She is currently n.p.o. resume oral medications once cleared by surgery    Diabetes mellitus uncontrolled  -Currently blood sugars are elevated.  Increase insulin NPH to 18 units twice daily.  Continue medium dose insulin sliding scale.  Will notify pharmacy to reduce dextrose in TPN.    Acute anemia likely postoperative blood loss  -Baseline hemoglobin is normal.  Recent hemoglobin is anywhere between 10-11.  Presented with a hemoglobin of 9.8 and currently 7.8  -Check serial hemoglobin.  Check iron panel.   embolism to the first subsegmental arterial level. There is no pulmonary embolism to this level. There is no right heart strain. There is no apparent aortic dissection or aneurysm.  Lungs show no pneumonia. Lungs are expiratory with mild bibasilar atelectasis. There are 2 prominent calcified benign granulomas in the right lung. There is no pneumothorax. There is no pleural effusion. There is no significant adenopathy.     1. No pulmonary emboli. 2. No acute finding. Electronically signed by KAREN WEIR    XR CHEST PORTABLE    Result Date: 1/14/2025  EXAM:  XR CHEST PORTABLE INDICATION: Change in mentation COMPARISON: None TECHNIQUE: Portable AP semiupright chest view at 2025 hours FINDINGS: The cardiac silhouette is within normal limits. The pulmonary vasculature is within normal limits. Right lung calcified granuloma are noted. The lungs and pleural spaces are otherwise clear. There is no pneumothorax. The visualized bones and upper abdomen are age-appropriate.     No acute process. Electronically signed by Tomasz Cox    CT HEAD WO CONTRAST    Result Date: 1/13/2025  EXAM: CT CODE NEURO HEAD WO CONTRAST CLINICAL HISTORY: Altered mental status, weakness INDICATION: Altered mental status, weakness COMPARISON: 11/4/2024. CT dose reduction was achieved through use of a standardized protocol tailored for this examination and automatic exposure control for dose modulation. TECHNIQUE: Serial axial images with a collimation of 5 mm were obtained from the skull base through the vertex.  Serial axial images with a collimation of 5 mm were obtained from the skull base through the vertex.  Sagittal and coronal reformatted images also obtained.  FINDINGS: Dural based extra-axial mass lesion at the left posterior cerebral fossa measures 26 x 16 x 21 mm in craniocaudal dimension lying along the inferior margin of the tentorium cerebelli. Posterior displacement of the cerebellum with no associated vasogenic edema. .  Periventricular hypodensity.. Mild sulcal and ventricular prominence. Mucous retention cyst in the right maxillary sinus. Otherwise; There is no evidence of midline shift or mass-effect. The ventricles are normal in size, position and configuration.  There are no extra-axial fluid collections. The mastoid air cells and are well pneumatized and clear.     26 x 21 x 16 mm mass lesion anterior to the cerebellum on the left with effacement of the left cerebellum but no associated vasogenic edema is consistent with a meningioma. Minimal chronic microvascular change and mild cerebral atrophy. No additional intracranial mass, hemorrhage or evidence of acute infarction. . Electronically signed by VLAD SANTO    Cardiac procedure    Result Date: 1/13/2025    Please see the separately dictated report      _______________________________________________________________________    TOTAL TIME:  76 Minutes    Critical Care Provided     Minutes non procedure based    Signed: Osito Vaughan MD    Procedures: see electronic medical records for all procedures/Xrays and details which were not copied into this note but were reviewed prior to creation of Plan.

## 2025-01-27 NOTE — PROGRESS NOTES
1900 Bedside and Verbal shift change report given to Wesley NAZARIO (oncoming nurse) by Wendy NAZARIO (offgoing nurse). Report included the following information Nurse Handoff Report, MAR, Recent Results, and Cardiac Rhythm S Tachy.     0100 Pt HR is going to 120s but is not sustaining (tachy since endorsement but no PRN meds and parameters by AM provider). Alayna CLEMENTS informed.    0300 Informed by Pharcist that SMOFLIPID is once a day and will have a refill on next due time.    0320 EKG was ordered. Used 3 EKG machines but was not successful to get results in system(Have asked all the nurses but still not able). Instead, used media to scan photo, use patient photo as type and EKG on description.    0330 EKG reviewed by Alayna CLEMENTS and inform to monitor patient for any changes. No signs of bleeding and no fever(98.3F).    0600 Latest Potassium is 3.4. Informed Alayna CLEMENTS (May follow Potassium replacement ordered).    0700 Bedside shift change report given to Samantha NAZARIO (oncoming nurse) by Wesley Cruz RN (offgoing nurse).  Report included the following information SBAR, MAR, Recent Results, and Cardiac Rhythm S Tachy

## 2025-01-27 NOTE — PLAN OF CARE
Problem: Chronic Conditions and Co-morbidities  Goal: Patient's chronic conditions and co-morbidity symptoms are monitored and maintained or improved  1/27/2025 0944 by Samantha Porras RN  Outcome: Progressing  Flowsheets (Taken 1/27/2025 0820)  Care Plan - Patient's Chronic Conditions and Co-Morbidity Symptoms are Monitored and Maintained or Improved:   Monitor and assess patient's chronic conditions and comorbid symptoms for stability, deterioration, or improvement   Collaborate with multidisciplinary team to address chronic and comorbid conditions and prevent exacerbation or deterioration   Update acute care plan with appropriate goals if chronic or comorbid symptoms are exacerbated and prevent overall improvement and discharge  1/26/2025 2209 by Wesley Cruz RN  Outcome: Progressing     Problem: Pain  Goal: Verbalizes/displays adequate comfort level or baseline comfort level  1/27/2025 0944 by Samantha Porras RN  Outcome: Progressing  Flowsheets (Taken 1/27/2025 0730)  Verbalizes/displays adequate comfort level or baseline comfort level:   Consider cultural and social influences on pain and pain management   Administer analgesics based on type and severity of pain and evaluate response   Encourage patient to monitor pain and request assistance   Assess pain using appropriate pain scale   Implement non-pharmacological measures as appropriate and evaluate response  1/26/2025 2209 by Wesley Cruz RN  Outcome: Progressing     Problem: Safety - Adult  Goal: Free from fall injury  1/27/2025 0944 by Samantha Porrsa RN  Outcome: Progressing  Flowsheets (Taken 1/27/2025 0943)  Free From Fall Injury:   Based on caregiver fall risk screen, instruct family/caregiver to ask for assistance with transferring infant if caregiver noted to have fall risk factors   Instruct family/caregiver on patient safety  1/26/2025 2209 by Wesley Cruz RN  Outcome: Progressing     Problem: Discharge Planning  Goal: Discharge to home or

## 2025-01-27 NOTE — PROGRESS NOTES
SURGERY PROGRESS NOTE      Admit Date: 2025    POD 3 Days Post-Op    Procedure: Procedure(s):  LAPAROSCOPIC RADHA PATCH AND ESOPHAGOGASTRODUODENOSCOPY      Subjective:     Patient denies pain and nausea.      Objective:     BP (!) 173/62   Pulse (!) 111   Temp 99.2 °F (37.3 °C) (Axillary)   Resp 21   Ht 1.575 m (5' 2\")   Wt 78 kg (172 lb)   SpO2 90%   BMI 31.46 kg/m²      Temp (24hrs), Av.7 °F (37.1 °C), Min:97.7 °F (36.5 °C), Max:99.3 °F (37.4 °C)      No intake/output data recorded.   1901 -  0700  In: 10 [I.V.:10]  Out: 2902 [Urine:2550; Drains:347]    Physical Exam:    General:  alert, appears stated age, cooperative, and no distress   Abdomen: soft, bowel sounds active, non-tender    DANG - serosanguinous 112 and 145 each    Incision:   healing well, no drainage, no erythema, no hernia, no seroma, no swelling, no dehiscence, incision well approximated         CBC:   Lab Results   Component Value Date/Time    WBC 19.5 2025 02:57 AM    RBC 2.64 2025 02:57 AM    HGB 7.8 2025 02:57 AM    HCT 24.1 2025 02:57 AM    MCV 91.3 2025 02:57 AM    MCH 29.5 2025 02:57 AM    MCHC 32.4 2025 02:57 AM    RDW 15.1 2025 02:57 AM     2025 02:57 AM    MPV 10.4 2025 02:57 AM     BMP:    Lab Results   Component Value Date/Time     2025 02:57 AM    K 3.4 2025 02:57 AM     2025 02:57 AM    CO2 25 2025 02:57 AM    BUN 15 2025 02:57 AM    CREATININE 0.58 2025 02:57 AM    CALCIUM 9.0 2025 02:57 AM    GFRAA >60 2022 12:28 PM    LABGLOM 89 2025 02:57 AM    LABGLOM >60 01/15/2024 02:39 PM    LABGLOM >60 2023 01:20 PM    GLUCOSE 261 2025 02:57 AM       Assessment:     Principal Problem:    Gastric ulcer with perforation, unspecified chronicity (HCC)  Resolved Problems:    * No resolved hospital problems. *    Recovering as expected     Plan:     Continue present treatment  UGI

## 2025-01-27 NOTE — PLAN OF CARE
Problem: Occupational Therapy - Adult  Goal: By Discharge: Performs self-care activities at highest level of function for planned discharge setting.  See evaluation for individualized goals.  Description: FUNCTIONAL STATUS PRIOR TO ADMISSION:  Lives with spouse in one level home. Was using a cane prior to admission earlier this month, recommended use of RW upon last discharge.    Receives Help From: Family, Home health (was home ~24 hrs after prior admission prior to decline in medical status), Prior Level of Assist for ADLs: Independent (prior to hospitalization earlier this month),  ,  ,  ,  ,  , Prior Level of Assist for Homemaking: Independent (prior to hospitalization earlier this month), Ambulation Assistance: Independent, Prior Level of Assist for Transfers: Independent, Active : No     Occupational Therapy Goals:  Initiated 1/27/2025  1.  Patient will perform grooming with Minimal Assist within 7 day(s).  2.  Patient will perform bathing with Minimal Assist within 7 day(s).  3.  Patient will perform lower body dressing with Moderate Assist within 7 day(s).  4.  Patient will perform toilet transfers with Moderate Assist  within 7 day(s).  5.  Patient will perform all aspects of toileting with Moderate Assist within 7 day(s).  6.  Patient will participate in upper extremity therapeutic exercise/activities with Minimal Assist for 5 minutes within 7 day(s).      Outcome: Not Progressing  OCCUPATIONAL THERAPY EVALUATION    Patient: Katey Sterling (84 y.o. female)  Date: 1/27/2025  Primary Diagnosis: Pneumoperitoneum [K66.8]  Gastric ulcer with perforation, unspecified chronicity (HCC) [K25.5]  Perforated abdominal viscus [R19.8]  Procedure(s) (LRB):  LAPAROSCOPIC RADHA PATCH AND ESOPHAGOGASTRODUODENOSCOPY (N/A) 3 Days Post-Op     Precautions: NPO (NGT)                  ASSESSMENT :  The patient is limited by decreased functional mobility, independence in ADLs, ROM, strength, endurance, cognition,  and mobility recommendations    COMMUNICATION/EDUCATION:   The patient's plan of care was discussed with: registered nurse    Patient Education  Education Given To: Patient;Family  Education Provided: Role of Therapy;Plan of Care;Mobility Training;Orientation  Education Method: Verbal  Barriers to Learning: Cognition  Education Outcome: Continued education needed    Thank you for this referral.  Lisha Gonzalez OT  Minutes: 39    Occupational Therapy Evaluation Charge Determination   History Examination Decision-Making   HIGH Complexity : Extensive review of history including physical, cognitive and psychocial history  HIGH Complexity: 5 Performance deficits relating to physical, cognitive, or psychosocial skills that result in activity limitations and/or participation restrictions  HIGH Complexity: Patient presents with comorbidities that affect occupational performance.  Significant modifications of tasks or assistance (eg. physical or verbal) with assessment (s) is necessary to enable pt to complete evaluation   Based on the above components, the patient evaluation is determined to be of the following complexity level: High

## 2025-01-27 NOTE — PROGRESS NOTES
Pharmacy TPN Management Note    TPN indication: Perforated gastric ulcer, extended NPO    TPN type: Peripheral    Macronutrients:  Protein: 50g  Dextrose: 100g  Lipids: 250mL 20% SMOF lipids every other day    Rate: 58.3 mL/hr    Labs:  Recent Labs     Units 25  0257 25  0508 25  0351   NA mmol/L 145 142 139   K mmol/L 3.4* 3.8 5.0   CL mmol/L 116* 115* 113*   CO2 mmol/L 25 22 19*   MG mg/dL 2.0 2.1 2.2   PHOS MG/DL 2.4* 2.3* 3.9   BUN MG/DL 15 19 17     Recent Labs     Units 25  2150   AST U/L 9*   ALT U/L 26     Recent Labs     Units 25  0257 25  0508   WBC K/uL 19.5* 27.6*   HGB g/dL 7.8* 8.1*   HCT % 24.1* 24.9*   TRIG MG/DL  --  128       Electrolytes (dosed per LITER):  Na: 35 meq/L; K: 38 meq/L; Phos:15 mmol/L; M meq/L; Ca 4.5 meq/L    Other additives in TPN: multivitamins  and trace elements    Impression/Plan:   TPN macronutrient/rate changes: TG less than 300 and started 250ml 20% SMOF lipids daily. Hold off on advancing other macros as BG was over 200 and phos was low.  TPN electrolyte changes: 20mmol of sodium phos and 3 runs of Kcl ordered to be given outside of bag. Will increase K.   TPN insulin changes: added 12 units      Pharmacy will continue to monitor enteral nutrition plan, electrolytes, renal function, and dietician recommendations and adjust parenteral nutrition as needed.    Thank you,  Estefany Kauffman [facility]

## 2025-01-27 NOTE — PLAN OF CARE
Problem: Chronic Conditions and Co-morbidities  Goal: Patient's chronic conditions and co-morbidity symptoms are monitored and maintained or improved  Outcome: Progressing     Problem: Pain  Goal: Verbalizes/displays adequate comfort level or baseline comfort level  Outcome: Progressing     Problem: Safety - Adult  Goal: Free from fall injury  Outcome: Progressing     Problem: Discharge Planning  Goal: Discharge to home or other facility with appropriate resources  Outcome: Progressing     Problem: Skin/Tissue Integrity  Goal: Skin integrity remains intact  Description: 1.  Monitor for areas of redness and/or skin breakdown  2.  Assess vascular access sites hourly  3.  Every 4-6 hours minimum:  Change oxygen saturation probe site  4.  Every 4-6 hours:  If on nasal continuous positive airway pressure, respiratory therapy assess nares and determine need for appliance change or resting period  Outcome: Progressing     Problem: ABCDS Injury Assessment  Goal: Absence of physical injury  Outcome: Progressing

## 2025-01-27 NOTE — CARE COORDINATION
Transition of Care Plan:    RUR: 21  Prior Level of Functioning: Pt resides with pt spouse in a one level home with a level entry at [address] [place] [address]. Pt is [facility] with [facility] services     Disposition: SNF: Pending bed availablity  [facility] vs [facility]  Insurance is Medicare and will not require auth.   Pt is a readmit.  CM requested updated therapy notes.     3:45 PM  [facility] and [facility] both are interested in this case and are following.  We will have to see who has a bed once stable for DC.     11:29 AM  CM completed chart review.   Referrals were sent to [facility] and [facility]. - Pending.     CHLOE: 1/31/25    If SNF or IPR: Date FOC offered: 1/24/25  Date FOC received: 1/24/25  Accepting facility: pending  Date authorization started with reference number: n/a  Date authorization received and expires:  n/a     Follow up appointments: after rehab  DME needed: n/a  Transportation at discharge: tbd Pending progress  IM/IMM Medicare/ letter given: 2nd IM letter will need to be delivered prior to DC.   Is patient a [place] and connected with [facility]? N/a   If yes, was [place] transfer form completed and [facility] notified?   Caregiver Contact: Spouse: Eren Sterling [phone]  Discharge Caregiver contacted prior to discharge? CM will continue to update  Care Conference needed? N/a  Barriers to discharge: UGI 1/28/25.. possible surgery? Check labs on 1/27/25. TPN? Diet Toleration?   Placement.     Hannah Ulloa, RAYMOND  [phone]

## 2025-01-28 ENCOUNTER — APPOINTMENT (OUTPATIENT)
Facility: HOSPITAL | Age: 85
DRG: 326 | End: 2025-01-28
Payer: MEDICARE

## 2025-01-28 LAB
ANION GAP SERPL CALC-SCNC: 4 MMOL/L (ref 2–12)
BASOPHILS # BLD: 0.03 K/UL (ref 0–0.1)
BASOPHILS NFR BLD: 0.2 % (ref 0–1)
BUN SERPL-MCNC: 12 MG/DL (ref 6–20)
BUN/CREAT SERPL: 21 (ref 12–20)
CALCIUM SERPL-MCNC: 8.9 MG/DL (ref 8.5–10.1)
CHLORIDE SERPL-SCNC: 114 MMOL/L (ref 97–108)
CO2 SERPL-SCNC: 26 MMOL/L (ref 21–32)
CREAT SERPL-MCNC: 0.56 MG/DL (ref 0.55–1.02)
DIFFERENTIAL METHOD BLD: ABNORMAL
EOSINOPHIL # BLD: 0.16 K/UL (ref 0–0.4)
EOSINOPHIL NFR BLD: 1.2 % (ref 0–7)
ERYTHROCYTE [DISTWIDTH] IN BLOOD BY AUTOMATED COUNT: 15.1 % (ref 11.5–14.5)
FERRITIN SERPL-MCNC: 277 NG/ML (ref 26–388)
GLUCOSE BLD STRIP.AUTO-MCNC: 246 MG/DL (ref 65–117)
GLUCOSE BLD STRIP.AUTO-MCNC: 263 MG/DL (ref 65–117)
GLUCOSE BLD STRIP.AUTO-MCNC: 290 MG/DL (ref 65–117)
GLUCOSE BLD STRIP.AUTO-MCNC: 427 MG/DL (ref 65–117)
GLUCOSE SERPL-MCNC: 222 MG/DL (ref 65–100)
HCT VFR BLD AUTO: 24.9 % (ref 35–47)
HGB BLD-MCNC: 7.9 G/DL (ref 11.5–16)
IMM GRANULOCYTES # BLD AUTO: 0.14 K/UL (ref 0–0.04)
IMM GRANULOCYTES NFR BLD AUTO: 1.1 % (ref 0–0.5)
IRON SATN MFR SERPL: 15 % (ref 20–50)
IRON SERPL-MCNC: 16 UG/DL (ref 35–150)
LYMPHOCYTES # BLD: 0.82 K/UL (ref 0.8–3.5)
LYMPHOCYTES NFR BLD: 6.4 % (ref 12–49)
MAGNESIUM SERPL-MCNC: 1.9 MG/DL (ref 1.6–2.4)
MCH RBC QN AUTO: 29.9 PG (ref 26–34)
MCHC RBC AUTO-ENTMCNC: 31.7 G/DL (ref 30–36.5)
MCV RBC AUTO: 94.3 FL (ref 80–99)
MONOCYTES # BLD: 0.59 K/UL (ref 0–1)
MONOCYTES NFR BLD: 4.6 % (ref 5–13)
NEUTS SEG # BLD: 11.07 K/UL (ref 1.8–8)
NEUTS SEG NFR BLD: 86.5 % (ref 32–75)
NRBC # BLD: 0 K/UL (ref 0–0.01)
NRBC BLD-RTO: 0 PER 100 WBC
PHOSPHATE SERPL-MCNC: 2.2 MG/DL (ref 2.6–4.7)
PLATELET # BLD AUTO: 270 K/UL (ref 150–400)
PMV BLD AUTO: 10.1 FL (ref 8.9–12.9)
POTASSIUM SERPL-SCNC: 3.3 MMOL/L (ref 3.5–5.1)
RBC # BLD AUTO: 2.64 M/UL (ref 3.8–5.2)
SERVICE CMNT-IMP: ABNORMAL
SODIUM SERPL-SCNC: 144 MMOL/L (ref 136–145)
TIBC SERPL-MCNC: 105 UG/DL (ref 250–450)
WBC # BLD AUTO: 12.8 K/UL (ref 3.6–11)

## 2025-01-28 PROCEDURE — 6360000002 HC RX W HCPCS: Performed by: INTERNAL MEDICINE

## 2025-01-28 PROCEDURE — 6370000000 HC RX 637 (ALT 250 FOR IP): Performed by: NURSE PRACTITIONER

## 2025-01-28 PROCEDURE — 2500000003 HC RX 250 WO HCPCS: Performed by: NURSE PRACTITIONER

## 2025-01-28 PROCEDURE — 84100 ASSAY OF PHOSPHORUS: CPT

## 2025-01-28 PROCEDURE — 1100000003 HC PRIVATE W/ TELEMETRY

## 2025-01-28 PROCEDURE — 6370000000 HC RX 637 (ALT 250 FOR IP): Performed by: INTERNAL MEDICINE

## 2025-01-28 PROCEDURE — 2500000003 HC RX 250 WO HCPCS: Performed by: INTERNAL MEDICINE

## 2025-01-28 PROCEDURE — 83550 IRON BINDING TEST: CPT

## 2025-01-28 PROCEDURE — 99231 SBSQ HOSP IP/OBS SF/LOW 25: CPT

## 2025-01-28 PROCEDURE — 36415 COLL VENOUS BLD VENIPUNCTURE: CPT

## 2025-01-28 PROCEDURE — 2580000003 HC RX 258: Performed by: INTERNAL MEDICINE

## 2025-01-28 PROCEDURE — 83735 ASSAY OF MAGNESIUM: CPT

## 2025-01-28 PROCEDURE — 6360000002 HC RX W HCPCS: Performed by: NURSE PRACTITIONER

## 2025-01-28 PROCEDURE — 6370000000 HC RX 637 (ALT 250 FOR IP)

## 2025-01-28 PROCEDURE — 80048 BASIC METABOLIC PNL TOTAL CA: CPT

## 2025-01-28 PROCEDURE — 97161 PT EVAL LOW COMPLEX 20 MIN: CPT

## 2025-01-28 PROCEDURE — 74240 X-RAY XM UPR GI TRC 1CNTRST: CPT

## 2025-01-28 PROCEDURE — 82728 ASSAY OF FERRITIN: CPT

## 2025-01-28 PROCEDURE — 97530 THERAPEUTIC ACTIVITIES: CPT

## 2025-01-28 PROCEDURE — 82962 GLUCOSE BLOOD TEST: CPT

## 2025-01-28 PROCEDURE — 2500000003 HC RX 250 WO HCPCS

## 2025-01-28 PROCEDURE — 2580000003 HC RX 258: Performed by: NURSE PRACTITIONER

## 2025-01-28 PROCEDURE — 74150 CT ABDOMEN W/O CONTRAST: CPT

## 2025-01-28 PROCEDURE — 6360000004 HC RX CONTRAST MEDICATION

## 2025-01-28 PROCEDURE — 2580000003 HC RX 258

## 2025-01-28 PROCEDURE — 85025 COMPLETE CBC W/AUTO DIFF WBC: CPT

## 2025-01-28 PROCEDURE — 6360000002 HC RX W HCPCS

## 2025-01-28 PROCEDURE — 83540 ASSAY OF IRON: CPT

## 2025-01-28 RX ORDER — POTASSIUM CHLORIDE 7.45 MG/ML
10 INJECTION INTRAVENOUS
Status: COMPLETED | OUTPATIENT
Start: 2025-01-28 | End: 2025-01-28

## 2025-01-28 RX ADMIN — SMOFLIPID 250 ML: 6; 6; 5; 3 INJECTION, EMULSION INTRAVENOUS at 18:57

## 2025-01-28 RX ADMIN — ACETAMINOPHEN 650 MG: 325 TABLET ORAL at 16:59

## 2025-01-28 RX ADMIN — IRON SUCROSE 200 MG: 20 INJECTION, SOLUTION INTRAVENOUS at 15:21

## 2025-01-28 RX ADMIN — POTASSIUM CHLORIDE 10 MEQ: 7.46 INJECTION, SOLUTION INTRAVENOUS at 06:30

## 2025-01-28 RX ADMIN — INSULIN HUMAN 15 UNITS: 100 INJECTION, SUSPENSION SUBCUTANEOUS at 22:05

## 2025-01-28 RX ADMIN — PIPERACILLIN AND TAZOBACTAM 3375 MG: 3; .375 INJECTION, POWDER, LYOPHILIZED, FOR SOLUTION INTRAVENOUS at 01:07

## 2025-01-28 RX ADMIN — SODIUM PHOSPHATE, MONOBASIC, MONOHYDRATE AND SODIUM PHOSPHATE, DIBASIC, ANHYDROUS 15 MMOL: 276; 142 INJECTION, SOLUTION INTRAVENOUS at 07:39

## 2025-01-28 RX ADMIN — IOHEXOL 300 ML: 240 INJECTION, SOLUTION INTRATHECAL; INTRAVASCULAR; INTRAVENOUS; ORAL at 09:35

## 2025-01-28 RX ADMIN — PANTOPRAZOLE SODIUM 40 MG: 40 INJECTION, POWDER, LYOPHILIZED, FOR SOLUTION INTRAVENOUS at 11:14

## 2025-01-28 RX ADMIN — INSULIN LISPRO 2 UNITS: 100 INJECTION, SOLUTION INTRAVENOUS; SUBCUTANEOUS at 11:23

## 2025-01-28 RX ADMIN — PIPERACILLIN AND TAZOBACTAM 3375 MG: 3; .375 INJECTION, POWDER, LYOPHILIZED, FOR SOLUTION INTRAVENOUS at 21:31

## 2025-01-28 RX ADMIN — INSULIN LISPRO 4 UNITS: 100 INJECTION, SOLUTION INTRAVENOUS; SUBCUTANEOUS at 16:22

## 2025-01-28 RX ADMIN — INSULIN LISPRO 4 UNITS: 100 INJECTION, SOLUTION INTRAVENOUS; SUBCUTANEOUS at 06:22

## 2025-01-28 RX ADMIN — HEPARIN SODIUM 5000 UNITS: 5000 INJECTION INTRAVENOUS; SUBCUTANEOUS at 13:42

## 2025-01-28 RX ADMIN — POTASSIUM CHLORIDE 10 MEQ: 7.46 INJECTION, SOLUTION INTRAVENOUS at 06:31

## 2025-01-28 RX ADMIN — SODIUM CHLORIDE, PRESERVATIVE FREE 10 ML: 5 INJECTION INTRAVENOUS at 21:32

## 2025-01-28 RX ADMIN — CALCIUM GLUCONATE: 98 INJECTION, SOLUTION INTRAVENOUS at 18:52

## 2025-01-28 RX ADMIN — INSULIN HUMAN 15 UNITS: 100 INJECTION, SUSPENSION SUBCUTANEOUS at 11:23

## 2025-01-28 RX ADMIN — Medication 1 AMPULE: at 13:42

## 2025-01-28 RX ADMIN — PIPERACILLIN AND TAZOBACTAM 3375 MG: 3; .375 INJECTION, POWDER, LYOPHILIZED, FOR SOLUTION INTRAVENOUS at 11:14

## 2025-01-28 RX ADMIN — INSULIN LISPRO 8 UNITS: 100 INJECTION, SOLUTION INTRAVENOUS; SUBCUTANEOUS at 22:06

## 2025-01-28 RX ADMIN — PIPERACILLIN AND TAZOBACTAM 3375 MG: 3; .375 INJECTION, POWDER, LYOPHILIZED, FOR SOLUTION INTRAVENOUS at 16:27

## 2025-01-28 RX ADMIN — POTASSIUM CHLORIDE 10 MEQ: 7.46 INJECTION, SOLUTION INTRAVENOUS at 04:28

## 2025-01-28 RX ADMIN — SODIUM CHLORIDE, PRESERVATIVE FREE 10 ML: 5 INJECTION INTRAVENOUS at 11:16

## 2025-01-28 RX ADMIN — FLUCONAZOLE 400 MG: 2 INJECTION, SOLUTION INTRAVENOUS at 06:15

## 2025-01-28 RX ADMIN — SODIUM CHLORIDE: 9 INJECTION, SOLUTION INTRAVENOUS at 21:30

## 2025-01-28 RX ADMIN — HEPARIN SODIUM 5000 UNITS: 5000 INJECTION INTRAVENOUS; SUBCUTANEOUS at 21:33

## 2025-01-28 RX ADMIN — HEPARIN SODIUM 5000 UNITS: 5000 INJECTION INTRAVENOUS; SUBCUTANEOUS at 06:23

## 2025-01-28 ASSESSMENT — PAIN SCALES - GENERAL
PAINLEVEL_OUTOF10: 2
PAINLEVEL_OUTOF10: 0

## 2025-01-28 ASSESSMENT — PAIN DESCRIPTION - LOCATION: LOCATION: ABDOMEN

## 2025-01-28 NOTE — PROGRESS NOTES
0745 Bedside and Verbal shift change report given to ARAVIND Poole (oncoming nurse) by ARAVIND Olson (offgoing nurse). Report included the following information Nurse Handoff Report.     1648 Pt noted to have elevated temp 99.7 from 98.4 at 1529. Stomach distended and hard. Denies pain. Dr. Vaughan alerted via secured messaging, Gladys Sin NP alerted by Unit CCL.

## 2025-01-28 NOTE — PLAN OF CARE
Predictive Model Details          69 (Warning)  Factor Value    Calculated 1/28/2025 08:40 19% Age 84 years old    Deterioration Index Model 16% Supplemental oxygen PAP (positive airway pressure)     15% Respiratory rate 24     13% Neurological exam X     9% Lynn coma scale 14     6% Cardiac rhythm Sinus tachy     5% Sodium 144 mmol/L     5% Pulse 106     4% WBC count abnormal (12.8 K/uL)     4% Hematocrit abnormal (24.9 %)     2% Systolic 140     1% Pulse oximetry 93 %     1% Potassium abnormal (3.3 mmol/L)     0% Temperature 97.2 °F (36.2 °C)        Problem: Chronic Conditions and Co-morbidities  Goal: Patient's chronic conditions and co-morbidity symptoms are monitored and maintained or improved  1/28/2025 0840 by Zulema Olivarez RN  Outcome: Progressing  1/27/2025 2306 by Wesley Cruz RN  Outcome: Progressing     Problem: Pain  Goal: Verbalizes/displays adequate comfort level or baseline comfort level  1/28/2025 0840 by Zulema Olivarez RN  Outcome: Progressing  Flowsheets (Taken 1/28/2025 0745)  Verbalizes/displays adequate comfort level or baseline comfort level: Encourage patient to monitor pain and request assistance  1/27/2025 2306 by Wesley Cruz RN  Outcome: Progressing     Problem: Safety - Adult  Goal: Free from fall injury  1/28/2025 0840 by Zulema Olivarez RN  Outcome: Progressing  1/27/2025 2306 by Wesley Cruz RN  Outcome: Progressing     Problem: Discharge Planning  Goal: Discharge to home or other facility with appropriate resources  1/28/2025 0840 by Zulema Olivarez RN  Outcome: Progressing  1/27/2025 2306 by Wesley Cruz RN  Outcome: Progressing     Problem: Skin/Tissue Integrity  Goal: Skin integrity remains intact  Description: 1.  Monitor for areas of redness and/or skin breakdown  2.  Assess vascular access sites hourly  3.  Every 4-6 hours minimum:  Change oxygen saturation probe site  4.  Every 4-6 hours:  If on nasal continuous positive airway pressure, respiratory therapy

## 2025-01-28 NOTE — PROGRESS NOTES
0730: Bedside and Verbal shift change report given to Erum Menchaca, ARAVIND & Zulema RN (oncoming nurse) by ARAVIND Olson (offgoing nurse). Report included the following information Nurse Handoff Report, Index, ED Encounter Summary, Intake/Output, MAR, and Recent Results.      0840: Pt off floor for upper G.I. imaging.    1008: Pt returned to unit, physical therapy at bedside.     1220: NG tube removed per MD order.    1715: TRANSFER - OUT REPORT:    Verbal report given to ARAVIND Lechuga on Katey Sterling  being transferred to Surg tele for routine progression of patient care       Report consisted of patient's Situation, Background, Assessment and   Recommendations(SBAR).     Information from the following report(s) Nurse Handoff Report, Index, ED Encounter Summary, Intake/Output, MAR, Recent Results, and Cardiac Rhythm Sinus tach  was reviewed with the receiving nurse.           Lines:   Peripheral IV 01/27/25 Left;Posterior Forearm (Active)   Site Assessment Clean, dry & intact 01/28/25 1657   Line Status Infusing 01/28/25 1657   Line Care Connections checked and tightened 01/28/25 1657   Phlebitis Assessment No symptoms 01/28/25 1657   Infiltration Assessment 0 01/28/25 1657   Alcohol Cap Used Yes 01/28/25 1657   Dressing Status Clean, dry & intact 01/28/25 1657   Dressing Type Transparent 01/28/25 1657       Extended Dwell Peripherial IV 01/27/25 Right Cephalic (Active)   Criteria for Appropriate Use Limited/no vessel suitable for conventional peripheral access 01/28/25 1657   Site Assessment Clean, dry & intact 01/28/25 1657   Phlebitis Assessment No symptoms 01/28/25 1657   Infiltration Assessment 0 01/28/25 1657   Line Status Infusing 01/28/25 1657   Line Care Connections checked and tightened 01/28/25 1657   Alcohol Cap Used Yes 01/28/25 1657   Date of Last Dressing Change 01/27/25 01/27/25 1600   Dressing Type Transparent 01/28/25 1657   Dressing Status Clean, dry & intact 01/28/25 1657   Dressing Intervention  New 01/28/25 9546        Opportunity for questions and clarification was provided.      Patient transported with:  Monitor

## 2025-01-28 NOTE — PROGRESS NOTES
1900 Bedside and Verbal shift change report given to Wesley NAZARIO (oncoming nurse) by Samantha (offgoing nurse). Report included the following information Nurse Handoff Report, MAR, Recent Results, and Cardiac Rhythm S tachy. AM provider Aware of HR and O2 of pt.    2110 Pt remove G16 IV line in left AC where TPN and lipids was running. Called [facility]  if I can use the other line that has G20 and was informed that I may use it as long as the pt is not complaining.    2130 New line inserted by Ayesha ROSENBERG. G20 on left forearm.    2205 Informed Ayesha RN RRT that line use was G20 inserted this AM and ER was called for help but no tech with Ultrasound training is available. Just to monitor pt for complaints of discomfort and phlebitis with ongoing TPN. Will come later to insert a bigger gauge. Alayna CLEMENTS informed at 2220.    2300 CPAP placed by RT.    0100 Blood samples collected and sent to lab.    0319 Latest Potassium is 3.3. Alayna CLEMENTS informed(New orders given and carried out).    0700 Bedside shift change report given to Erum NAZARIO (oncoming nurse) by Wesley Cruz RN (offgoing nurse).  Report included the following information SBAR, MAR, Recent Results, and Cardiac Rhythm S tachy

## 2025-01-28 NOTE — DIABETES MGMT
[facility]  DIABETES MANAGEMENT CONSULT    Consulted by Provider for advanced nursing evaluation and care for inpatient blood glucose management.    Evaluation and Action Plan   Katey Sterling is an 84 year old female patient with a hx of Type 2 diabetes. The patient reportedly takes weekly Ozempic at home. Her current A1c 7.2% demonstrates good BG control. The patient was previously admitted after complications of a scheduled cardiac catheterizations. The patient did not receive cardiac stents. While hospitalized she was receiving high dose of dexametahsone and diabetes management was consulted to assist with elevated Bg's. Using scheduled NPH diabetes management was able to gain control of BG's. The patient was discharged on 1/23/25 and was readmitted the next day due to complaints of abdominal pain and constipation. Possible bowel perforation and pancreatic mass( cerebellopontine meningioma). The patient was referred to general surgery and found to have a perforated peptic ulcer. Peritonitic with early signs of sepsis. Emergent surgery was indicated. Laparoscopic dayana patch and esophagogastroduodenoscopy performed. NGT in place. TPN started. BG's elevated. Low dose basal insulin. I suspect TPN is playing a role. Insulin to be pit in TPN bag tonight.   Bg's improving but remain elevated. The insulin in the TPN has been increased to start tonight. Diabetes management will monitor Bg's and make adjustments as needed.     Blood glucose pattern        Management Rationale Action Plan   Medication   Basal needs Using 0.4 units/kg/D based on weight Use 15 units NPH q 12 hours    Nutritional needs Covers carb load in meals Using 1:6 ratio ( 1 units insulin for 6 grams dextrose    Corrective insulin Using medium dose sensitivity based on weight    Additional orders  Carb consistent diet (60g CHO/meal)       Diabetes Discharge Plan   Medication  TBD   Referral  []        Outpatient  Intervention   Nursing Diagnosis Risk for unstable blood glucose pattern   Nursing Intervention Domain 5250 Decision-making Support   Nursing Interventions Examined current inpatient diabetes/blood glucose control   Explored factors facilitating and impeding inpatient management  Explored corrective strategies with patient and responsible inpatient provider   Informed patient of rational for insulin strategy while hospitalized     Nursing Diagnosis 26091 Ineffective Health Management   Nursing Intervention Domain 5250 Decision-making Support   Nursing Interventions Identified diabetes self-management practices impeding diabetes control  Discussed diabetes survival skills related to  Healthy Plate eating plan; given handouts  Role of physical activity in improving insulin sensitivity and action  Procedure for blood glucose monitoring & options for low-cost products  Medications plan at discharge     Billing Code(s)   [] 35020 IP subsequent hospital care - 50 minutes   [] 42458 IP subsequent hospital care - 35 minutes   [x] 62963 IP subsequent hospital care - 25 minutes   [] 56422 IP initial hospital care - 40 minutes     Before making these care recommendations, I personally reviewed the hospitalization record, including notes, laboratory & diagnostic data and current medications, and examined the patient at the bedside.  Total minutes: 25 minutes    BRADLEY Hopper - CNS   Diabetes Clinical Nurse Specialist   [facility]  Access via [employer]

## 2025-01-28 NOTE — PLAN OF CARE
Problem: Occupational Therapy - Adult  Goal: By Discharge: Performs self-care activities at highest level of function for planned discharge setting.  See evaluation for individualized goals.  Description: FUNCTIONAL STATUS PRIOR TO ADMISSION:  Lives with spouse in one level home. Was using a cane prior to admission earlier this month, recommended use of RW upon last discharge.    Receives Help From: Family, Home health (was home ~24 hrs after prior admission prior to decline in medical status), Prior Level of Assist for ADLs: Independent (prior to hospitalization earlier this month),  ,  ,  ,  ,  , Prior Level of Assist for Homemaking: Independent (prior to hospitalization earlier this month), Ambulation Assistance: Independent, Prior Level of Assist for Transfers: Independent, Active : No     Occupational Therapy Goals:  Initiated 1/27/2025  1.  Patient will perform grooming with Minimal Assist within 7 day(s).  2.  Patient will perform bathing with Minimal Assist within 7 day(s).  3.  Patient will perform lower body dressing with Moderate Assist within 7 day(s).  4.  Patient will perform toilet transfers with Moderate Assist  within 7 day(s).  5.  Patient will perform all aspects of toileting with Moderate Assist within 7 day(s).  6.  Patient will participate in upper extremity therapeutic exercise/activities with Minimal Assist for 5 minutes within 7 day(s).      1/27/2025 1353 by Lisha Gonzalez OT  Outcome: Not Progressing

## 2025-01-28 NOTE — PROGRESS NOTES
SURGERY PROGRESS NOTE      Admit Date: 2025    POD 4 Days Post-Op    Procedure: Procedure(s):  LAPAROSCOPIC RADHA PATCH AND ESOPHAGOGASTRODUODENOSCOPY      Subjective:     Patient denies pain and nausea.  Having diarrhea.        Objective:     BP (!) 142/83   Pulse (!) 109   Temp 97.9 °F (36.6 °C) (Oral)   Resp 22   Ht 1.575 m (5' 2\")   Wt 77.9 kg (171 lb 13.2 oz)   SpO2 91%   BMI 31.43 kg/m²      Temp (24hrs), Av.1 °F (36.7 °C), Min:97.2 °F (36.2 °C), Max:98.4 °F (36.9 °C)      701 -  1900  In: 361 [I.V.:80.1]  Out: 350 [Urine:300; Drains:20]  1901 -  0700  In: 3827.1 [I.V.:164.6]  Out: 1810 [Urine:1300; Drains:510]    Physical Exam:    General:  alert, appears stated age, cooperative, and no distress   Abdomen: soft, bowel sounds active, non-tender    DANG serous    Incision:   healing well, no drainage, no erythema, no hernia, no seroma, no swelling, no dehiscence, incision well approximated         CBC:   Lab Results   Component Value Date/Time    WBC 12.8 2025 01:10 AM    RBC 2.64 2025 01:10 AM    HGB 7.9 2025 01:10 AM    HCT 24.9 2025 01:10 AM    MCV 94.3 2025 01:10 AM    MCH 29.9 2025 01:10 AM    MCHC 31.7 2025 01:10 AM    RDW 15.1 2025 01:10 AM     2025 01:10 AM    MPV 10.1 2025 01:10 AM     BMP:    Lab Results   Component Value Date/Time     2025 01:10 AM    K 3.3 2025 01:10 AM     2025 01:10 AM    CO2 26 2025 01:10 AM    BUN 12 2025 01:10 AM    CREATININE 0.56 2025 01:10 AM    CALCIUM 8.9 2025 01:10 AM    GFRAA >60 2022 12:28 PM    LABGLOM 90 2025 01:10 AM    LABGLOM >60 01/15/2024 02:39 PM    LABGLOM >60 2023 01:20 PM    GLUCOSE 222 2025 01:10 AM       UGI - no leak     Assessment:     Principal Problem:    Gastric ulcer with perforation, unspecified chronicity (HCC)  Active Problems:    Pneumoperitoneum  Resolved Problems:

## 2025-01-28 NOTE — PROGRESS NOTES
Hospitalist Admission Note    NAME:   Katey Sterling   : 1940   MRN: [id number]     Date/Time: 2025 1:49 PM    Patient PCP: Catalino Gaines MD    ______________________________________________________________________  Given the patient's current clinical presentation, I have a high level of concern for decompensation if discharged from the emergency department.  Complex decision making was performed, which includes reviewing the patient's available past medical records, laboratory results, and x-ray films.       My assessment of this patient's clinical condition and my plan of care is as follows.    Assessment / Plan:    Perforated gastric ulcer status post Andi patch  -Status post laparoscopic Andi patch and is EGD on   -General Surgery following.  Pending UGI today  -Continue TPN.  Monitor electrolytes.  -Postoperative care per general surgery  -Continue Zosyn and fluconazole  -Check CBC and BMP in a.m. tomorrow    Hypokalemia  Hypophosphatemia  -Recheck electrolytes in a.m. tomorrow    Status post extubation postoperatively to nasal cannula  -She is currently on room air    History of recent admission to the hospital for acute encephalopathy, postprocedural fever and also severe sepsis  -Status post completion of antibiotics      History history of left cerebellopontine angle meningioma  Hypertension  History of coronary artery disease status post recent  -She is currently n.p.o. resume oral medications once cleared by surgery    Diabetes mellitus uncontrolled  -Currently blood sugars are elevated.  Diabetic team is adjusting insulin doses    Acute anemia likely postoperative blood loss  -Baseline hemoglobin is normal.  Recent hemoglobin is anywhere between 10-11.  Presented with a hemoglobin of 9.8 and currently 7.9.  -Check serial hemoglobin.  Transfuse to keep hemoglobin more than 7  -FOBT pending  -Due to mild iron deficiency anemia, will start Venofer            Medical

## 2025-01-28 NOTE — PLAN OF CARE
Problem: Physical Therapy - Adult  Goal: By Discharge: Performs mobility at highest level of function for planned discharge setting.  See evaluation for individualized goals.  Description: FUNCTIONAL STATUS PRIOR TO ADMISSION: Pt lives with spouse and was indep with occasional use of SPC. Recently admitted and discharged 1/23 with recommendation for use of RW and HH PT. Pt returned to ED on 1/24.      Physical Therapy Goals  Initiated 1/28/2025  1.  Patient will move from supine to sit and sit to supine and roll side to side in bed with minimal assistance within 7 day(s).    2.  Patient will perform sit to stand with contact guard assist within 7 day(s).  3.  Patient will transfer from bed to chair and chair to bed with minimal assistance using the least restrictive device within 7 day(s).  4.  Patient will ambulate with minimal assistance for 25 feet with the least restrictive device within 7 day(s).     Outcome: Progressing   PHYSICAL THERAPY EVALUATION    Patient: Katey Sterling (84 y.o. female)  Date: 1/28/2025  Primary Diagnosis: Pneumoperitoneum [K66.8]  Gastric ulcer with perforation, unspecified chronicity (HCC) [K25.5]  Perforated abdominal viscus [R19.8]  Procedure(s) (LRB):  LAPAROSCOPIC ANDI PATCH AND ESOPHAGOGASTRODUODENOSCOPY (N/A) 4 Days Post-Op   Precautions: Restrictions/Precautions: Fall Risk, Other (Comment) (NPO)  Activity Level: Up with Assist                      ASSESSMENT :   DEFICITS/IMPAIRMENTS:   The patient is limited by decreased functional mobility, strength, activity tolerance, safety awareness, cognition, command following, attention/concentration, coordination, balance, increased pain levels following admit with perorated gastric ulcer s/p lap Andi patch and EGD POD#4.    Pt presents on NGT to suction. Required loud, repeated instruction and increased time to follow simple commands. Educated pt on use of log roll sequence to decreased abdominal strain/ pain with bed mob;      Forehead ,Nose,    CAD (coronary artery disease)     lipids    Cancer (HCC)     Breast    Diabetes (HCC)     Diverticulosis     Hypercholesterolemia     Hypertension     Ill-defined condition     blood transfusion hx with both knee reolacement--autologous    Nausea & vomiting     Obesity     Sleep apnea     C PAP     Past Surgical History:   Procedure Laterality Date    BREAST BIOPSY      left    CARDIAC PROCEDURE N/A 2025    Left heart cath / coronary angiography performed by Bib Meek MD at [facility] CARDIAC CATH LAB    CATARACT REMOVAL      bilateral    GYN       O,ParaO    HEENT      scalp excision    HEENT      MOH's nose    INVASIVE VASCULAR N/A 2025    Ultrasound guided vascular access performed by Bib Meek MD at [facility] CARDIAC CATH LAB    LAPAROSCOPY N/A 2025    LAPAROSCOPIC RADHA PATCH AND ESOPHAGOGASTRODUODENOSCOPY performed by Angie Adams MD at [facility] MAIN OR    TONSILLECTOMY      TOTAL KNEE ARTHROPLASTY  ,    bilateral       Home Situation:  Social/Functional History  Lives With: Spouse  Type of Home: House  Home Layout: One level  Home Access: Level entry  Bathroom Toilet: Standard  Home Equipment: Walker - Rolling, Cane  Has the patient had two or more falls in the past year or any fall with injury in the past year?: No  Receives Help From: Family, Home health (was home ~24 hrs after prior admission prior to decline in medical status)  Prior Level of Assist for ADLs: Independent (prior to hospitalization earlier this month)  Prior Level of Assist for Homemaking: Independent (prior to hospitalization earlier this month)  Prior Level of Assist for Ambulation: Independent household ambulator, with or without device  Prior Level of Assist for Transfers: Independent  Active : No  Patient's  Info: Pt spouse  Occupation: Retired  Type of Occupation: teacher    Cognitive/Behavioral Status:  Orientation  Orientation

## 2025-01-28 NOTE — PROGRESS NOTES
Pharmacy TPN Management Note    TPN indication: Perforated gastric ulcer, extended NPO    TPN type: Peripheral    Macronutrients:  Protein: 50g  Dextrose: 100g  Lipids: 250mL 20% SMOF lipids every other day    Rate: 58.3 mL/hr    Labs:  Recent Labs     Units 25  0110 25  0257 25  0508   NA mmol/L 144 145 142   K mmol/L 3.3* 3.4* 3.8   CL mmol/L 114* 116* 115*   CO2 mmol/L 26 25 22   MG mg/dL 1.9 2.0 2.1   PHOS MG/DL 2.2* 2.4* 2.3*   BUN MG/DL 12 15 19     No results for input(s): \"AST\", \"ALT\" in the last 72 hours.    Invalid input(s): \"BILIRUBIN\", \"AP\"    Recent Labs     Units 25  0110 25  0257 25  0508   WBC K/uL 12.8*   < > 27.6*   HGB g/dL 7.9*   < > 8.1*   HCT % 24.9*   < > 24.9*   TRIG MG/DL  --   --  128    < > = values in this interval not displayed.       Electrolytes (dosed per LITER):  Na: 35 meq/L; K: 76 meq/L; Phos:25 mmol/L; M meq/L; Ca 4.5 meq/L    Other additives in TPN: multivitamins  and trace elements    Impression/Plan:   TPN macronutrient/rate changes: none  TPN electrolyte changes:Naphos and K repleted. Increased K and Phos   TPN insulin changes: increased to 16 units based on the recommendation of the diabetic Nurse     Pharmacy will continue to monitor enteral nutrition plan, electrolytes, renal function, and dietician recommendations and adjust parenteral nutrition as needed.    Thank you,  Estefany Kauffman [facility]

## 2025-01-28 NOTE — PROGRESS NOTES
I have reviewed and am in agreement with the assessment and documentation as performed by my orientee, ARAVIND Poole. Please refer to Zulema's progress note & flowsheet documentation for update on pt's daily events.

## 2025-01-29 LAB
ANION GAP SERPL CALC-SCNC: 5 MMOL/L (ref 2–12)
BASOPHILS # BLD: 0.11 K/UL (ref 0–0.1)
BASOPHILS NFR BLD: 1 % (ref 0–1)
BUN SERPL-MCNC: 10 MG/DL (ref 6–20)
BUN/CREAT SERPL: 18 (ref 12–20)
CALCIUM SERPL-MCNC: 8.7 MG/DL (ref 8.5–10.1)
CHLORIDE SERPL-SCNC: 113 MMOL/L (ref 97–108)
CO2 SERPL-SCNC: 24 MMOL/L (ref 21–32)
CREAT SERPL-MCNC: 0.57 MG/DL (ref 0.55–1.02)
DIFFERENTIAL METHOD BLD: ABNORMAL
EOSINOPHIL # BLD: 0.21 K/UL (ref 0–0.4)
EOSINOPHIL NFR BLD: 2 % (ref 0–7)
ERYTHROCYTE [DISTWIDTH] IN BLOOD BY AUTOMATED COUNT: 15.3 % (ref 11.5–14.5)
GLUCOSE BLD STRIP.AUTO-MCNC: 245 MG/DL (ref 65–117)
GLUCOSE BLD STRIP.AUTO-MCNC: 256 MG/DL (ref 65–117)
GLUCOSE BLD STRIP.AUTO-MCNC: 259 MG/DL (ref 65–117)
GLUCOSE BLD STRIP.AUTO-MCNC: 322 MG/DL (ref 65–117)
GLUCOSE SERPL-MCNC: 249 MG/DL (ref 65–100)
HCT VFR BLD AUTO: 25.2 % (ref 35–47)
HGB BLD-MCNC: 8.1 G/DL (ref 11.5–16)
IMM GRANULOCYTES # BLD AUTO: 0 K/UL (ref 0–0.04)
IMM GRANULOCYTES NFR BLD AUTO: 0 % (ref 0–0.5)
LYMPHOCYTES # BLD: 0.64 K/UL (ref 0.8–3.5)
LYMPHOCYTES NFR BLD: 6 % (ref 12–49)
MAGNESIUM SERPL-MCNC: 1.8 MG/DL (ref 1.6–2.4)
MCH RBC QN AUTO: 29.3 PG (ref 26–34)
MCHC RBC AUTO-ENTMCNC: 32.1 G/DL (ref 30–36.5)
MCV RBC AUTO: 91.3 FL (ref 80–99)
MONOCYTES # BLD: 0.21 K/UL (ref 0–1)
MONOCYTES NFR BLD: 2 % (ref 5–13)
MYELOCYTES NFR BLD MANUAL: 1 %
NEUTS BAND NFR BLD MANUAL: 3 %
NEUTS SEG # BLD: 9.42 K/UL (ref 1.8–8)
NEUTS SEG NFR BLD: 85 % (ref 32–75)
NRBC # BLD: 0 K/UL (ref 0–0.01)
NRBC BLD-RTO: 0 PER 100 WBC
PHOSPHATE SERPL-MCNC: 2.9 MG/DL (ref 2.6–4.7)
PLATELET # BLD AUTO: 249 K/UL (ref 150–400)
PMV BLD AUTO: 10.6 FL (ref 8.9–12.9)
POTASSIUM SERPL-SCNC: 3.5 MMOL/L (ref 3.5–5.1)
RBC # BLD AUTO: 2.76 M/UL (ref 3.8–5.2)
RBC MORPH BLD: ABNORMAL
SERVICE CMNT-IMP: ABNORMAL
SODIUM SERPL-SCNC: 142 MMOL/L (ref 136–145)
WBC # BLD AUTO: 10.7 K/UL (ref 3.6–11)

## 2025-01-29 PROCEDURE — 6360000002 HC RX W HCPCS: Performed by: NURSE PRACTITIONER

## 2025-01-29 PROCEDURE — 6360000002 HC RX W HCPCS: Performed by: INTERNAL MEDICINE

## 2025-01-29 PROCEDURE — 6370000000 HC RX 637 (ALT 250 FOR IP)

## 2025-01-29 PROCEDURE — 82962 GLUCOSE BLOOD TEST: CPT

## 2025-01-29 PROCEDURE — 94760 N-INVAS EAR/PLS OXIMETRY 1: CPT

## 2025-01-29 PROCEDURE — 97535 SELF CARE MNGMENT TRAINING: CPT

## 2025-01-29 PROCEDURE — 2500000003 HC RX 250 WO HCPCS: Performed by: NURSE PRACTITIONER

## 2025-01-29 PROCEDURE — 84100 ASSAY OF PHOSPHORUS: CPT

## 2025-01-29 PROCEDURE — 99231 SBSQ HOSP IP/OBS SF/LOW 25: CPT

## 2025-01-29 PROCEDURE — 85025 COMPLETE CBC W/AUTO DIFF WBC: CPT

## 2025-01-29 PROCEDURE — 6370000000 HC RX 637 (ALT 250 FOR IP): Performed by: INTERNAL MEDICINE

## 2025-01-29 PROCEDURE — 1100000003 HC PRIVATE W/ TELEMETRY

## 2025-01-29 PROCEDURE — 97530 THERAPEUTIC ACTIVITIES: CPT

## 2025-01-29 PROCEDURE — 36415 COLL VENOUS BLD VENIPUNCTURE: CPT

## 2025-01-29 PROCEDURE — 80048 BASIC METABOLIC PNL TOTAL CA: CPT

## 2025-01-29 PROCEDURE — 83735 ASSAY OF MAGNESIUM: CPT

## 2025-01-29 PROCEDURE — 2580000003 HC RX 258: Performed by: NURSE PRACTITIONER

## 2025-01-29 PROCEDURE — 97116 GAIT TRAINING THERAPY: CPT

## 2025-01-29 PROCEDURE — 99024 POSTOP FOLLOW-UP VISIT: CPT | Performed by: NURSE PRACTITIONER

## 2025-01-29 RX ORDER — NIFEDIPINE 30 MG/1
30 TABLET, EXTENDED RELEASE ORAL DAILY
Status: DISCONTINUED | OUTPATIENT
Start: 2025-01-29 | End: 2025-01-30 | Stop reason: HOSPADM

## 2025-01-29 RX ADMIN — PIPERACILLIN AND TAZOBACTAM 3375 MG: 3; .375 INJECTION, POWDER, LYOPHILIZED, FOR SOLUTION INTRAVENOUS at 14:53

## 2025-01-29 RX ADMIN — PIPERACILLIN AND TAZOBACTAM 3375 MG: 3; .375 INJECTION, POWDER, LYOPHILIZED, FOR SOLUTION INTRAVENOUS at 07:30

## 2025-01-29 RX ADMIN — INSULIN LISPRO 6 UNITS: 100 INJECTION, SOLUTION INTRAVENOUS; SUBCUTANEOUS at 18:20

## 2025-01-29 RX ADMIN — NIFEDIPINE 30 MG: 30 TABLET, FILM COATED, EXTENDED RELEASE ORAL at 20:35

## 2025-01-29 RX ADMIN — INSULIN LISPRO 4 UNITS: 100 INJECTION, SOLUTION INTRAVENOUS; SUBCUTANEOUS at 06:21

## 2025-01-29 RX ADMIN — INSULIN HUMAN 20 UNITS: 100 INJECTION, SUSPENSION SUBCUTANEOUS at 11:02

## 2025-01-29 RX ADMIN — SODIUM CHLORIDE, PRESERVATIVE FREE 10 ML: 5 INJECTION INTRAVENOUS at 08:31

## 2025-01-29 RX ADMIN — INSULIN HUMAN 24 UNITS: 100 INJECTION, SUSPENSION SUBCUTANEOUS at 20:47

## 2025-01-29 RX ADMIN — HEPARIN SODIUM 5000 UNITS: 5000 INJECTION INTRAVENOUS; SUBCUTANEOUS at 21:55

## 2025-01-29 RX ADMIN — FLUCONAZOLE 400 MG: 2 INJECTION, SOLUTION INTRAVENOUS at 04:39

## 2025-01-29 RX ADMIN — Medication 1 AMPULE: at 08:31

## 2025-01-29 RX ADMIN — INSULIN LISPRO 2 UNITS: 100 INJECTION, SOLUTION INTRAVENOUS; SUBCUTANEOUS at 20:46

## 2025-01-29 RX ADMIN — IRON SUCROSE 200 MG: 20 INJECTION, SOLUTION INTRAVENOUS at 14:48

## 2025-01-29 RX ADMIN — INSULIN LISPRO 4 UNITS: 100 INJECTION, SOLUTION INTRAVENOUS; SUBCUTANEOUS at 11:03

## 2025-01-29 RX ADMIN — HEPARIN SODIUM 5000 UNITS: 5000 INJECTION INTRAVENOUS; SUBCUTANEOUS at 14:47

## 2025-01-29 RX ADMIN — Medication 1 AMPULE: at 20:36

## 2025-01-29 RX ADMIN — HEPARIN SODIUM 5000 UNITS: 5000 INJECTION INTRAVENOUS; SUBCUTANEOUS at 06:21

## 2025-01-29 RX ADMIN — PANTOPRAZOLE SODIUM 40 MG: 40 INJECTION, POWDER, LYOPHILIZED, FOR SOLUTION INTRAVENOUS at 08:31

## 2025-01-29 RX ADMIN — PIPERACILLIN AND TAZOBACTAM 3375 MG: 3; .375 INJECTION, POWDER, LYOPHILIZED, FOR SOLUTION INTRAVENOUS at 21:56

## 2025-01-29 RX ADMIN — SODIUM CHLORIDE, PRESERVATIVE FREE 10 ML: 5 INJECTION INTRAVENOUS at 20:39

## 2025-01-29 NOTE — PROGRESS NOTES
End of Shift Note    Bedside shift change report given to Quentin (oncoming nurse) by Michelle Christian RN (offgoing nurse).  Report included the following information SBAR, Kardex, and MAR    Shift worked:  7p-7a     Shift summary and any significant changes:     Scheduled medications were given, see MAR.  IV's have been flushed and the patient is getting continuous TPN, see MAR.  Family member was present at bedside throughout my shift.  Patient had three BM during my shift.  Patient uses a CPAP at night.  Patient was repositioned during my shift.  Patient teaching and routine rounding has been done.     Concerns for physician to address:       Zone phone for oncoming shift:          Activity:  Level of Assistance: Maximum assist, patient does 25-49%  Number times ambulated in hallways past shift: 0  Number of times OOB to chair past shift: 0    Cardiac:   Cardiac Monitoring: Yes      Cardiac Rhythm: Sinus tachy    Access:  Current line(s): PIV     Genitourinary:   Urinary Status: Voiding, Incontinent    Respiratory:   O2 Device: PAP (positive airway pressure)  Chronic home O2 use?: NO  Incentive spirometer at bedside: NO    GI:  Last BM (including prior to admit): 01/28/25  Current diet:  ADULT DIET; Clear Liquid  TPN-Adult 2-in-1 Peripheral Line (Custom)  Passing flatus: YES    Pain Management:   Patient states pain is manageable on current regimen: YES    Skin:  Carlton Scale Score: 18  Interventions: Wound Offloading (Prevention Methods): Blankets, Pillows, Repositioning, Turning    Patient Safety:  Fall Risk: Nursing Judgement-Fall Risk High(Add Comments): Yes  Fall Risk Interventions  Nursing Judgement-Fall Risk High(Add Comments): Yes  Toilet Every 2 Hours-In Advance of Need: Yes  Hourly Visual Checks: Awake, In bed  Fall Visual Posted: Armband, Fall sign posted, Socks  Room Door Open: Deferred to decrease stimulation  Alarm On: Bed  Patient Moved Closer to Nursing Station: No    Active Consults:   IP CONSULT

## 2025-01-29 NOTE — CARE COORDINATION
0827 - Patient transferred from [facility] to ; handoff sent to Unit .      Arleen Rodrigez, TAWANAN, RN    Care Management  [phone]

## 2025-01-29 NOTE — DIABETES MGMT
[facility]  DIABETES MANAGEMENT CONSULT    Consulted by Provider for advanced nursing evaluation and care for inpatient blood glucose management.    Evaluation and Action Plan   Katey Sterling is an 84 year old female patient with a hx of Type 2 diabetes. The patient reportedly takes weekly Ozempic at home. Her current A1c 7.2% demonstrates good BG control. The patient was previously admitted after complications of a scheduled cardiac catheterizations. The patient did not receive cardiac stents. While hospitalized she was receiving high dose of dexametahsone and diabetes management was consulted to assist with elevated Bg's. Using scheduled NPH diabetes management was able to gain control of BG's. The patient was discharged on 1/23/25 and was readmitted the next day due to complaints of abdominal pain and constipation. Possible bowel perforation and pancreatic mass( cerebellopontine meningioma). The patient was referred to general surgery and found to have a perforated peptic ulcer. Peritonitic with early signs of sepsis. Emergent surgery was indicated. Laparoscopic dayana patch and esophagogastroduodenoscopy performed. NGT in place. TPN started. BG's elevated. Low dose basal insulin. I suspect TPN is playing a role. Insulin to be pit in TPN bag tonight.   Bg's improving but remain elevated. The insulin in the TPN has been increased to start tonight. Diabetes management will monitor Bg's and make adjustments as needed.   Bg's continue to improve but remain elevated. TPN continues.NPH insulin dosing adjusted.     Blood glucose pattern        Management Rationale Action Plan   Medication   Basal needs Using 0.6 units/kg/D based on weight Use 24 units NPH q 12 hours    Nutritional needs Covers carb load in meals Using 1:6 ratio ( 1 units insulin for 6 grams dextrose    Corrective insulin Using medium dose sensitivity based on weight    Additional orders  Carb consistent diet (60g  CHO/meal)       Diabetes Discharge Plan   Medication  TBD   Referral  []        Outpatient diabetes education   Additional orders            Initial Presentation   Katey Sterling is a 84 y.o. female who presented to the ED on 1/24/25  LAB: glucose 327. A1c 7.3%.       Narrative   INDICATION:  Abnormal result of cardiovascular function study, unspecified /  Reason for exam:->sepsis of unknown source  sepsis of unknown source    EXAM: CT Abdomen and Pelvis is performed with 100 mL Isovue 370 contrast IV  without oral contrast. CT dose reduction was achieved through use of a  standardized protocol tailored for this examination and automatic exposure  control for dose modulation.    FINDINGS:  There is no inflammation, ascites, pneumoperitoneum or significant adenopathy. ...   Impression   No acute finding or apparent inflammation.  Pancreatic head region cyst/cystic mass as described.  Colonic diverticulosis.  Moderate rectal stool distention.       HX:   Past Medical History:   Diagnosis Date    Basal cell cancer 2012    Irena ,Claudy,    CAD (coronary artery disease)     lipids    Cancer (HCC) 1996    Breast    Diabetes (HCC)     Diverticulosis     Hypercholesterolemia     Hypertension     Ill-defined condition     blood transfusion hx with both knee reolacement--autologous    Nausea & vomiting     Obesity     Sleep apnea 1997    C PAP        INITIAL DX: Pneumoperitoneum [K66.8]  Gastric ulcer with perforation, unspecified chronicity (HCC) [K25.5]  Perforated abdominal viscus [R19.8]     Current Treatment     TX: Protonix. Diflucan. Zosyn. Clot prevention.     Hospital Course   Clinical progress has been uncomplicated    Diabetes History   Type Diabetes- Type 2 DM.   Ambulatory BG management provided by: PCP  Family History: Resides with . Independent with ADL's prior to admission. Family at bedside and supportive.     Lab Results   Component Value Date    LABA1C 7.2 (H) 01/15/2025    LABA1C  7.3 (H) 01/14/2025    LABA1C 8.1 (H) 08/14/2024     Diabetes-related Medical History    Macrovascular disease  CAD      Diabetes Medication History  Diabetes drug class Diabetes drug name Additional Comments   GLP-1 Laci Ozempic      Diabetes self-management practices:   Eating pattern   [x] Eating a carbohydrate-controlled meal plan     Physical activity pattern   [x] Employing a physical activity program to control BG    Monitoring pattern   [x] Testing BGs sufficiently to inform self-management adjustments      Taking medications pattern  [x] Consistent administration  [x] Affordable  Reducing risks  [x] Influenza:   01/15/2024   [x] Pneumonia: 10/08/2015      Social determinants of health impacting diabetes self-management practices    Concerned that you need to know more about how to stay healthy with diabetes    Overall evaluation:    [x]  Achieving A1c target with drug therapy & self-care practices    Subjective   \" Hi\"      Objective   Physical exam  General Overweight  female in no acute distress. Conversant and cooperative  Neuro  Alert, oriented   Vital Signs   Vitals:    01/29/25 1554   BP: (!) 143/64   Pulse: 94   Resp:    Temp: 98.2 °F (36.8 °C)   SpO2: 95%         Laboratory  Recent Labs     01/27/25  0257 01/28/25  0110 01/29/25  0622   WBC 19.5* 12.8* 10.7   HGB 7.8* 7.9* 8.1*   HCT 24.1* 24.9* 25.2*   MCV 91.3 94.3 91.3    270 249     Recent Labs     01/27/25  0257 01/28/25  0110 01/29/25  0622    144 142   K 3.4* 3.3* 3.5   * 114* 113*   CO2 25 26 24   PHOS 2.4* 2.2* 2.9   BUN 15 12 10   CREATININE 0.58 0.56 0.57     Lab Results   Component Value Date    ALT 26 01/24/2025    AST 9 (L) 01/24/2025    ALKPHOS 77 01/24/2025    BILITOT 0.3 01/24/2025     Lab Results   Component Value Date    TSH 0.829 01/14/2025         Factors impacting BG management  Factor Dose Comments   Nutrition:  Standard meals   60 grams/meal    Pain PRN acetaminophen    Infection Zosyn    Kidney function

## 2025-01-29 NOTE — PLAN OF CARE
Problem: Chronic Conditions and Co-morbidities  Goal: Patient's chronic conditions and co-morbidity symptoms are monitored and maintained or improved  Outcome: Progressing     Problem: Pain  Goal: Verbalizes/displays adequate comfort level or baseline comfort level  Outcome: Progressing     Problem: Safety - Adult  Goal: Free from fall injury  Outcome: Progressing     Problem: Discharge Planning  Goal: Discharge to home or other facility with appropriate resources  Outcome: Progressing     Problem: Skin/Tissue Integrity  Goal: Skin integrity remains intact  Description: 1.  Monitor for areas of redness and/or skin breakdown  2.  Assess vascular access sites hourly  3.  Every 4-6 hours minimum:  Change oxygen saturation probe site  4.  Every 4-6 hours:  If on nasal continuous positive airway pressure, respiratory therapy assess nares and determine need for appliance change or resting period  Outcome: Progressing  Flowsheets (Taken 1/29/2025 0831)  Skin Integrity Remains Intact: Monitor for areas of redness and/or skin breakdown     Problem: ABCDS Injury Assessment  Goal: Absence of physical injury  Outcome: Progressing     Problem: Nutrition Deficit:  Goal: Optimize nutritional status  Outcome: Progressing

## 2025-01-29 NOTE — PROGRESS NOTES
PT Note    Chart reviewed up to date in prep for PT session, however pt is sleeping very soundly.   at the bedside.  Will defer and continue to follow.

## 2025-01-29 NOTE — PROGRESS NOTES
Comprehensive Nutrition Assessment    Type and Reason for Visit:  Reassess    Nutrition Recommendations/Plan:   Diet per surgery  Ensure plus HP 3x/day  Encourage PO intakes, honor preferences as able, provide assistance PRN  Rec wean TPN with diet advancement  Monitor and record PO intakes, supplement acceptance, and Bms in I/Os     Malnutrition Assessment:  Malnutrition Status:  Insufficient data (01/25/25 1134)    Context:  Acute Illness     Findings of the 6 clinical characteristics of malnutrition:  Energy Intake:  Unable to assess  Weight Loss:  No weight loss     Body Fat Loss:  Unable to assess     Muscle Mass Loss:  Unable to assess    Fluid Accumulation:  No fluid accumulation     Strength:  Not Performed    Nutrition Assessment:    Follow up. Pt sleeping at interview attempt, family at bedside report pt is tolerating clears, eager for diet advancement. Diet advanced to full liquids after interview. Will add ONS to help meet needs.    Nutrition Related Findings:    Labs: Na 132, K 3.5, BUN 10, Creat 0.57, Gluc 249, Mag 1.8, Phos 2.9. Meds: insulin lispro, insulin NPH, iron sucrose, pantoprazole. Generalized trace edema. BM 1/28. Wound Type: Surgical Incision       Current Nutrition Intake & Therapies:    Average Meal Intake: %  Average Supplements Intake: None Ordered  TPN-Adult 2-in-1 Peripheral Line (Custom)  DIET ONE TIME MESSAGE;  ADULT DIET; Full Liquid    Anthropometric Measures:  Height: 157.5 cm (5' 2.01\")  Ideal Body Weight (IBW): 110 lbs (50 kg)    Current Body Weight: 77.9 kg (171 lb 11.8 oz), 156.1 % IBW. Weight Source: Bed scale  Current BMI (kg/m2): 31.4  BMI Categories: Obese Class 1 (BMI 30.0-34.9)    Estimated Daily Nutrient Needs:  Energy Requirements Based On: Formula  Weight Used for Energy Requirements: Admission  Energy (kcal/day): MSJ 1550 (1185 x 1.3)  Weight Used for Protein Requirements: Admission  Protein (g/day): 78-93g (1-1.2gPro/kg)  Method Used for Fluid

## 2025-01-29 NOTE — PROGRESS NOTES
End of Shift Note    Bedside shift change report given to Stella (oncoming nurse) by Quentin Smith RN (offgoing nurse).  Report included the following information SBAR, Kardex, Procedure Summary, Intake/Output, and MAR    Shift worked:  7a-7p     Shift summary and any significant changes:     Pt arrived on unit at 1755. VSS. No complaints of pain n/v. Tolerating diet. 2 loose large bms noted upon arrival onto unit. Q2 turn and heels elevated. Told in report pt ABD more distended now than previously. Erum NAZARIO ([facility]) stated NP Merrick notified. No new orders. Uneventful      Concerns for physician to address:       Zone phone for oncoming shift:          Activity:  Level of Assistance: Maximum assist, patient does 25-49%  Number times ambulated in hallways past shift: 0  Number of times OOB to chair past shift: 0    Cardiac:   Cardiac Monitoring: Yes      Cardiac Rhythm: Sinus tachy    Access:  Current line(s): PIV     Genitourinary:   Urinary Status: Voiding, Incontinent    Respiratory:   O2 Device: PAP (positive airway pressure)  Chronic home O2 use?: CPAP   Incentive spirometer at bedside: YES    GI:  Last BM (including prior to admit): 01/28/25  Current diet:  ADULT DIET; Clear Liquid  TPN-Adult 2-in-1 Peripheral Line (Custom)  Passing flatus: YES    Pain Management:   Patient states pain is manageable on current regimen: YES    Skin:  Carlton Scale Score: 16  Interventions: Wound Offloading (Prevention Methods): Pillows, Repositioning, Turning    Patient Safety:  Fall Risk: Nursing Judgement-Fall Risk High(Add Comments): Yes  Fall Risk Interventions  Nursing Judgement-Fall Risk High(Add Comments): Yes  Toilet Every 2 Hours-In Advance of Need: Yes  Hourly Visual Checks: Awake, In bed  Fall Visual Posted: Fall sign posted, Socks  Room Door Open: Deferred to decrease stimulation  Alarm On: Bed  Patient Moved Closer to Nursing Station: No    Active Consults:   IP CONSULT TO PHARMACY  IP CONSULT TO DIETITIAN  IP CONSULT TO

## 2025-01-29 NOTE — PLAN OF CARE
Problem: Occupational Therapy - Adult  Goal: By Discharge: Performs self-care activities at highest level of function for planned discharge setting.  See evaluation for individualized goals.  Description: FUNCTIONAL STATUS PRIOR TO ADMISSION:  Lives with spouse in one level home. Was using a cane prior to admission earlier this month, recommended use of RW upon last discharge.    Receives Help From: Family, Home health (was home ~24 hrs after prior admission prior to decline in medical status), Prior Level of Assist for ADLs: Independent (prior to hospitalization earlier this month),  ,  ,  ,  ,  , Prior Level of Assist for Homemaking: Independent (prior to hospitalization earlier this month), Ambulation Assistance: Independent, Prior Level of Assist for Transfers: Independent, Active : No     Occupational Therapy Goals:  Initiated 1/27/2025  1.  Patient will perform grooming with Minimal Assist within 7 day(s).  2.  Patient will perform bathing with Minimal Assist within 7 day(s).  3.  Patient will perform lower body dressing with Moderate Assist within 7 day(s).  4.  Patient will perform toilet transfers with Moderate Assist  within 7 day(s).  5.  Patient will perform all aspects of toileting with Moderate Assist within 7 day(s).  6.  Patient will participate in upper extremity therapeutic exercise/activities with Minimal Assist for 5 minutes within 7 day(s).      Outcome: Progressing  OCCUPATIONAL THERAPY TREATMENT  Patient: Katey Sterling (84 y.o. female)  Date: 1/29/2025  Primary Diagnosis: Pneumoperitoneum [K66.8]  Gastric ulcer with perforation, unspecified chronicity (HCC) [K25.5]  Perforated abdominal viscus [R19.8]  Procedure(s) (LRB):  LAPAROSCOPIC RADHA PATCH AND ESOPHAGOGASTRODUODENOSCOPY (N/A) 5 Days Post-Op   Precautions: Fall Risk, Other (Comment) (NPO)                Chart, occupational therapy assessment, plan of care, and goals were reviewed.    ASSESSMENT  Patient continues to benefit

## 2025-01-29 NOTE — PROGRESS NOTES
SURGERY PROGRESS NOTE      Admit Date: 2025    POD 5 Days Post-Op    Procedure: Procedure(s):  LAPAROSCOPIC RADHA PATCH AND ESOPHAGOGASTRODUODENOSCOPY      Subjective:     Patient denies pain and nausea.  Tolerating CLEAR LIQUID DIET. Having bowel function.     Objective:     BP (!) 155/58   Pulse 91   Temp 98.4 °F (36.9 °C)   Resp 19   Ht 1.575 m (5' 2\")   Wt 77.9 kg (171 lb 13.2 oz)   SpO2 94%   BMI 31.43 kg/m²      Temp (24hrs), Av.6 °F (37 °C), Min:98.2 °F (36.8 °C), Max:99.7 °F (37.6 °C)      701 -  1900  In: 240 [P.O.:240]  Out: 70 [Drains:70]  1901 -  0700  In: 1081 [P.O.:720; I.V.:80.1]  Out: 1283 [Urine:700; Drains:478]    Physical Exam:    General:  alert, appears stated age, cooperative, and no distress   Abdomen: soft, bowel sounds active, non-tender    DANG serous    Incision:   healing well, no drainage, no erythema, no hernia, no seroma, no swelling, no dehiscence, incision well approximated         CBC:   Lab Results   Component Value Date/Time    WBC 10.7 2025 06:22 AM    RBC 2.76 2025 06:22 AM    HGB 8.1 2025 06:22 AM    HCT 25.2 2025 06:22 AM    MCV 91.3 2025 06:22 AM    MCH 29.3 2025 06:22 AM    MCHC 32.1 2025 06:22 AM    RDW 15.3 2025 06:22 AM     2025 06:22 AM    MPV 10.6 2025 06:22 AM     BMP:    Lab Results   Component Value Date/Time     2025 06:22 AM    K 3.5 2025 06:22 AM     2025 06:22 AM    CO2 24 2025 06:22 AM    BUN 10 2025 06:22 AM    CREATININE 0.57 2025 06:22 AM    CALCIUM 8.7 2025 06:22 AM    GFRAA >60 2022 12:28 PM    LABGLOM 90 2025 06:22 AM    LABGLOM >60 01/15/2024 02:39 PM    LABGLOM >60 2023 01:20 PM    GLUCOSE 249 2025 06:22 AM       UGI - no leak     Assessment:     Principal Problem:    Gastric ulcer with perforation, unspecified chronicity (HCC)  Active Problems:    Pneumoperitoneum  Resolved

## 2025-01-29 NOTE — PROGRESS NOTES
Nursing contacted Nocturnist/cross cover provider via non-urgent messaging system [employer] and notified patient glucose 427, got 8 units ssi and 15 units nph as ordered per protocol already. No other concerns reported. No acute distress reported. No other information provided by nurse. VS reported stable. Patient denies any further complaints or concerns. See prior hospitalist group notes for complete details of course of treatment. Recent lab work and documented vs reviewed.    Nurse wcm and notify further concerns overnight, on q6h accuchecks and ssi as is on tpn. To avoid hypoglycemia insulin as above as already given, nurse wcm and would consider additional short acting insulin if needed overnight, appears glucose running mainly in the 200s- defer additional mgmt long acting insulin dosing to dayshift team. Continue remaining plan/orders as per dayshift team. Will defer further evaluation/management and timing of discontinuation of regimens to the day shift primary attending care team.      Nursing to notify dayshift Hospitalist team in the AM of overnight events and for further/continued concerns. Will remain available overnight cross coverage for further concerns if nursing/patient needs. Please note, there are RRT systems in this hospital in place that if nursing has acute or critical patient condition change or concern, this is to help facilitate and notify that patient needs immediate bedside evaluation by a provider.    Non-billable note.

## 2025-01-29 NOTE — PROGRESS NOTES
Spiritual Health History and Assessment/Progress Note  [facility]    Initial Encounter,  ,  ,      Name: Katey Sterling MRN: [id number]    Age: 84 y.o.     Sex: female   Language: English   Confucianist: Denominational   Gastric ulcer with perforation, unspecified chronicity (HCC)     Date: 1/29/2025            Total Time Calculated: 15 min              Spiritual Assessment began in [facility] 3 SURG TELE        Referral/Consult From: Rounding   Encounter Overview/Reason: Initial Encounter  Service Provided For: Patient and family together    Kalyani, Belief, Meaning:   Patient identifies as spiritual, is connected with a kalyani tradition or spiritual practice, and has beliefs or practices that help with coping during difficult times  Family/Friends identify as spiritual, are connected with a kalyani tradition or spiritual practice, and have beliefs or practices that help with coping during difficult times      Importance and Influence:  Patient has no beliefs influential to healthcare decision-making identified during this visit  Family/Friends have no beliefs influential to healthcare decision-making identified during this visit    Community:  Patient is connected with a spiritual community and feels well-supported. Support system includes: Spouse/Partner, Kalyani Community, Friends, and Extended family  Family/Friends are connected with a spiritual community: and feel well-supported. Support system includes: Spouse/Partner, Kalyani Community, Friends, and Extended family    Assessment and Plan of Care:     Patient Interventions include: Facilitated expression of thoughts and feelings, Explored spiritual coping/struggle/distress, and Affirmed coping skills/support systems; offered assurance of prayer  Family/Friends Interventions include: Facilitated expression of thoughts and feelings, Explored spiritual coping/struggle/distress, and Affirmed coping skills/support systems    Patient Plan of Care: No spiritual needs

## 2025-01-30 VITALS
HEART RATE: 112 BPM | OXYGEN SATURATION: 89 % | WEIGHT: 171.83 LBS | RESPIRATION RATE: 17 BRPM | SYSTOLIC BLOOD PRESSURE: 131 MMHG | BODY MASS INDEX: 31.62 KG/M2 | DIASTOLIC BLOOD PRESSURE: 73 MMHG | HEIGHT: 62 IN | TEMPERATURE: 98.6 F

## 2025-01-30 LAB
ANION GAP SERPL CALC-SCNC: 6 MMOL/L (ref 2–12)
BACTERIA SPEC CULT: NORMAL
BACTERIA SPEC CULT: NORMAL
BASOPHILS # BLD: 0 K/UL (ref 0–0.1)
BASOPHILS NFR BLD: 0 % (ref 0–1)
BUN SERPL-MCNC: 9 MG/DL (ref 6–20)
BUN/CREAT SERPL: 18 (ref 12–20)
CALCIUM SERPL-MCNC: 8.9 MG/DL (ref 8.5–10.1)
CHLORIDE SERPL-SCNC: 109 MMOL/L (ref 97–108)
CO2 SERPL-SCNC: 24 MMOL/L (ref 21–32)
CREAT SERPL-MCNC: 0.51 MG/DL (ref 0.55–1.02)
DIFFERENTIAL METHOD BLD: ABNORMAL
EOSINOPHIL # BLD: 0.26 K/UL (ref 0–0.4)
EOSINOPHIL NFR BLD: 2 % (ref 0–7)
ERYTHROCYTE [DISTWIDTH] IN BLOOD BY AUTOMATED COUNT: 15 % (ref 11.5–14.5)
GLUCOSE BLD STRIP.AUTO-MCNC: 142 MG/DL (ref 65–117)
GLUCOSE BLD STRIP.AUTO-MCNC: 154 MG/DL (ref 65–117)
GLUCOSE BLD STRIP.AUTO-MCNC: 271 MG/DL (ref 65–117)
GLUCOSE SERPL-MCNC: 114 MG/DL (ref 65–100)
HCT VFR BLD AUTO: 28 % (ref 35–47)
HGB BLD-MCNC: 8.8 G/DL (ref 11.5–16)
IMM GRANULOCYTES # BLD AUTO: 0 K/UL (ref 0–0.04)
IMM GRANULOCYTES NFR BLD AUTO: 0 % (ref 0–0.5)
LYMPHOCYTES # BLD: 1.19 K/UL (ref 0.8–3.5)
LYMPHOCYTES NFR BLD: 9 % (ref 12–49)
MAGNESIUM SERPL-MCNC: 1.8 MG/DL (ref 1.6–2.4)
MCH RBC QN AUTO: 29.6 PG (ref 26–34)
MCHC RBC AUTO-ENTMCNC: 31.4 G/DL (ref 30–36.5)
MCV RBC AUTO: 94.3 FL (ref 80–99)
MONOCYTES # BLD: 0.66 K/UL (ref 0–1)
MONOCYTES NFR BLD: 5 % (ref 5–13)
NEUTS SEG # BLD: 11.09 K/UL (ref 1.8–8)
NEUTS SEG NFR BLD: 84 % (ref 32–75)
NRBC # BLD: 0.03 K/UL (ref 0–0.01)
NRBC BLD-RTO: 0.2 PER 100 WBC
PHOSPHATE SERPL-MCNC: 2.8 MG/DL (ref 2.6–4.7)
PLATELET # BLD AUTO: 269 K/UL (ref 150–400)
PMV BLD AUTO: 10.9 FL (ref 8.9–12.9)
POTASSIUM SERPL-SCNC: 3.4 MMOL/L (ref 3.5–5.1)
RBC # BLD AUTO: 2.97 M/UL (ref 3.8–5.2)
RBC MORPH BLD: ABNORMAL
SERVICE CMNT-IMP: ABNORMAL
SERVICE CMNT-IMP: NORMAL
SERVICE CMNT-IMP: NORMAL
SODIUM SERPL-SCNC: 139 MMOL/L (ref 136–145)
WBC # BLD AUTO: 13.2 K/UL (ref 3.6–11)

## 2025-01-30 PROCEDURE — 6370000000 HC RX 637 (ALT 250 FOR IP)

## 2025-01-30 PROCEDURE — 84100 ASSAY OF PHOSPHORUS: CPT

## 2025-01-30 PROCEDURE — 97535 SELF CARE MNGMENT TRAINING: CPT

## 2025-01-30 PROCEDURE — 6360000002 HC RX W HCPCS: Performed by: NURSE PRACTITIONER

## 2025-01-30 PROCEDURE — 99024 POSTOP FOLLOW-UP VISIT: CPT | Performed by: NURSE PRACTITIONER

## 2025-01-30 PROCEDURE — 80048 BASIC METABOLIC PNL TOTAL CA: CPT

## 2025-01-30 PROCEDURE — 6360000002 HC RX W HCPCS: Performed by: INTERNAL MEDICINE

## 2025-01-30 PROCEDURE — 2580000003 HC RX 258: Performed by: NURSE PRACTITIONER

## 2025-01-30 PROCEDURE — 83735 ASSAY OF MAGNESIUM: CPT

## 2025-01-30 PROCEDURE — 6370000000 HC RX 637 (ALT 250 FOR IP): Performed by: INTERNAL MEDICINE

## 2025-01-30 PROCEDURE — 2500000003 HC RX 250 WO HCPCS: Performed by: NURSE PRACTITIONER

## 2025-01-30 PROCEDURE — 36415 COLL VENOUS BLD VENIPUNCTURE: CPT

## 2025-01-30 PROCEDURE — 82962 GLUCOSE BLOOD TEST: CPT

## 2025-01-30 PROCEDURE — 85025 COMPLETE CBC W/AUTO DIFF WBC: CPT

## 2025-01-30 RX ORDER — LOSARTAN POTASSIUM 25 MG/1
TABLET ORAL
Qty: 30 TABLET | Refills: 0 | Status: SHIPPED
Start: 2025-01-30

## 2025-01-30 RX ORDER — PANTOPRAZOLE SODIUM 40 MG/1
40 TABLET, DELAYED RELEASE ORAL
Qty: 90 TABLET | Refills: 0 | Status: SHIPPED
Start: 2025-01-30

## 2025-01-30 RX ADMIN — PIPERACILLIN AND TAZOBACTAM 3375 MG: 3; .375 INJECTION, POWDER, LYOPHILIZED, FOR SOLUTION INTRAVENOUS at 06:26

## 2025-01-30 RX ADMIN — IRON SUCROSE 200 MG: 20 INJECTION, SOLUTION INTRAVENOUS at 15:28

## 2025-01-30 RX ADMIN — HEPARIN SODIUM 5000 UNITS: 5000 INJECTION INTRAVENOUS; SUBCUTANEOUS at 15:28

## 2025-01-30 RX ADMIN — Medication 1 AMPULE: at 08:44

## 2025-01-30 RX ADMIN — SODIUM CHLORIDE, PRESERVATIVE FREE 10 ML: 5 INJECTION INTRAVENOUS at 08:44

## 2025-01-30 RX ADMIN — FLUCONAZOLE 400 MG: 2 INJECTION, SOLUTION INTRAVENOUS at 05:20

## 2025-01-30 RX ADMIN — PANTOPRAZOLE SODIUM 40 MG: 40 INJECTION, POWDER, LYOPHILIZED, FOR SOLUTION INTRAVENOUS at 08:44

## 2025-01-30 RX ADMIN — INSULIN HUMAN 24 UNITS: 100 INJECTION, SUSPENSION SUBCUTANEOUS at 08:44

## 2025-01-30 RX ADMIN — HEPARIN SODIUM 5000 UNITS: 5000 INJECTION INTRAVENOUS; SUBCUTANEOUS at 06:22

## 2025-01-30 RX ADMIN — NIFEDIPINE 30 MG: 30 TABLET, FILM COATED, EXTENDED RELEASE ORAL at 08:44

## 2025-01-30 NOTE — DISCHARGE INSTRUCTIONS
Continue pantoprazole 40 mg indefinitely for gastric ulcer  Avoid taking medications like ibuprofen/NSAIDs  Follow-up CBC BMP in 5 days  Continue full liquid diet for 1 week and then soft diet follow-up with surgery as outpt

## 2025-01-30 NOTE — PROGRESS NOTES
SURGERY PROGRESS NOTE      Admit Date: 2025    POD 6 Days Post-Op    Procedure: Procedure(s):  LAPAROSCOPIC RADHA PATCH AND ESOPHAGOGASTRODUODENOSCOPY    Patient seen with Dr. Silvestre      Subjective:     Patient doing well today but feels weak. No nausea/vomiting. Having bowel function. Tolerating FULL LIQUID DIET.     Objective:     BP (!) 148/73   Pulse 59   Temp 97.7 °F (36.5 °C) (Oral)   Resp 17   Ht 1.575 m (5' 2.01\")   Wt 77.9 kg (171 lb 13.2 oz)   SpO2 95%   BMI 31.42 kg/m²      Temp (24hrs), Av °F (36.7 °C), Min:97.5 °F (36.4 °C), Max:98.4 °F (36.9 °C)      No intake/output data recorded.   1901 -  0700  In: 340 [P.O.:340]  Out: 895 [Urine:500; Drains:395]    Physical Exam:    General:  alert, appears stated age, cooperative, and no distress   Abdomen: soft, bowel sounds active, non-tender    DANG serous x2   Incision:   healing well, no drainage, no erythema, no hernia, no seroma, no swelling, no dehiscence, incision well approximated         CBC:   Lab Results   Component Value Date/Time    WBC 13.2 2025 05:49 AM    RBC 2.97 2025 05:49 AM    HGB 8.8 2025 05:49 AM    HCT 28.0 2025 05:49 AM    MCV 94.3 2025 05:49 AM    MCH 29.6 2025 05:49 AM    MCHC 31.4 2025 05:49 AM    RDW 15.0 2025 05:49 AM     2025 05:49 AM    MPV 10.9 2025 05:49 AM     BMP:    Lab Results   Component Value Date/Time     2025 05:49 AM    K 3.4 2025 05:49 AM     2025 05:49 AM    CO2 24 2025 05:49 AM    BUN 9 2025 05:49 AM    CREATININE 0.51 2025 05:49 AM    CALCIUM 8.9 2025 05:49 AM    GFRAA >60 2022 12:28 PM    LABGLOM >90 2025 05:49 AM    LABGLOM >60 01/15/2024 02:39 PM    LABGLOM >60 2023 01:20 PM    GLUCOSE 114 2025 05:49 AM       UGI - no leak     Assessment:     Principal Problem:    Gastric ulcer with perforation, unspecified chronicity (HCC)  Active Problems:

## 2025-01-30 NOTE — PLAN OF CARE
Problem: Occupational Therapy - Adult  Goal: By Discharge: Performs self-care activities at highest level of function for planned discharge setting.  See evaluation for individualized goals.  Description: FUNCTIONAL STATUS PRIOR TO ADMISSION:  Lives with spouse in one level home. Was using a cane prior to admission earlier this month, recommended use of RW upon last discharge.    Receives Help From: Family, Home health (was home ~24 hrs after prior admission prior to decline in medical status), Prior Level of Assist for ADLs: Independent (prior to hospitalization earlier this month),  ,  ,  ,  ,  , Prior Level of Assist for Homemaking: Independent (prior to hospitalization earlier this month), Ambulation Assistance: Independent, Prior Level of Assist for Transfers: Independent, Active : No     Occupational Therapy Goals:  Initiated 1/27/2025  1.  Patient will perform grooming with Minimal Assist within 7 day(s).  2.  Patient will perform bathing with Minimal Assist within 7 day(s).  3.  Patient will perform lower body dressing with Moderate Assist within 7 day(s).  4.  Patient will perform toilet transfers with Moderate Assist  within 7 day(s).  5.  Patient will perform all aspects of toileting with Moderate Assist within 7 day(s).  6.  Patient will participate in upper extremity therapeutic exercise/activities with Minimal Assist for 5 minutes within 7 day(s).      Outcome: Progressing   OCCUPATIONAL THERAPY TREATMENT  Patient: Katey Sterling (84 y.o. female)  Date: 1/30/2025  Primary Diagnosis: Pneumoperitoneum [K66.8]  Gastric ulcer with perforation, unspecified chronicity (HCC) [K25.5]  Perforated abdominal viscus [R19.8]  Procedure(s) (LRB):  LAPAROSCOPIC RADHA PATCH AND ESOPHAGOGASTRODUODENOSCOPY (N/A) 6 Days Post-Op   Precautions: Fall Risk, Other (Comment) (NPO)                Chart, occupational therapy assessment, plan of care, and goals were reviewed.    ASSESSMENT  Patient continues to  benefit from skilled OT services and is slowly progressing towards goals. Pt received in bed with  at bedside; pt flat affect ([place] at baseline, place hearing aides in mid session), oriented x3. Pt required some encouragement to participate with therapy, receptive to education on importance of OOB activity. Pt denies pain. Pt overall modA for bed mobility via log roll due to decreased strength and impaired activity tolerance. Pt required min-modAx1 for functional transfers and Kelly with RW to ambulate short distance to chair. Pt affect improved when sitting in chair, completed self-feeding and grooming while seated with set-up. ADLs remain below PLOF due to decreased functional strength, activity tolerance, standing tolerance, functional reach, standing balance and cognition. Pt will continue to benefit from acute OT services while admitted.     PLAN :  Patient continues to benefit from skilled intervention to address the above impairments.  Continue treatment per established plan of care to address goals.    Recommend with staff: 1 assist with RW for OOB ADLs to chair/BSC    Recommend next OT session: POC progression    Recommendation for discharge: (in order for the patient to meet his/her long term goals):   Moderate intensity short-term skilled occupational therapy up to 5x/week    Other factors to consider for discharge: patient's current support system is unable to meet their requirements for physical assistance, impaired cognition, high risk for falls, not safe to be alone, and concern for safely navigating or managing the home environment    IF patient discharges home will need the following DME: continuing to assess with progress       SUBJECTIVE:   Patient stated “Everyone has been so accommodating here.”    OBJECTIVE DATA SUMMARY:   Cognitive/Behavioral Status:  Orientation  Overall Orientation Status: Impaired  Orientation Level: Oriented to person;Oriented to place;Oriented to time;Disoriented to

## 2025-01-30 NOTE — PROGRESS NOTES
Hospitalist Admission Note    NAME:   Katey Sterling   : 1940   MRN: [id number]     Date/Time: 2025 7:13 PM    Patient PCP: Catalino Gaines MD    ______________________________________________________________________  Given the patient's current clinical presentation, I have a high level of concern for decompensation if discharged from the emergency department.  Complex decision making was performed, which includes reviewing the patient's available past medical records, laboratory results, and x-ray films.       My assessment of this patient's clinical condition and my plan of care is as follows.    Assessment / Plan:    Perforated gastric ulcer status post Andi patch  -Status post laparoscopic Andi patch and is EGD on   -General Surgery following.    -UGI-no extravasation  -FULL LIQUID DIET today  Plan to wean TPN    D/C planning 1-2 days pending diet.     -Postoperative care per general surgery  -Continue Zosyn and fluconazole  -Check CBC and BMP in a.m. tomorrow    Hypokalemia  Hypophosphatemia  Repeat as needed    Status post extubation postoperatively to nasal cannula  -She is currently on room air    History of recent admission to the hospital for acute encephalopathy, postprocedural fever and also severe sepsis  -Status post completion of antibiotics      History history of left cerebellopontine angle meningioma  Hypertension  History of coronary artery disease status post recent  Resume nifedipine      Diabetes mellitus uncontrolled  -Currently blood sugars are elevated.  Diabetic team is adjusting insulin doses  Currently on NPH 24 units every 12    Acute anemia likely postoperative blood loss  -Baseline hemoglobin is normal.  Recent hemoglobin is anywhere between 10-11.  Presented with a hemoglobin of 9.8 and currently 7.9.  -Check serial hemoglobin.  Transfuse to keep hemoglobin more than 7  -FOBT pending  -Due to mild iron deficiency anemia, will start Venofer while  Total images: 3 COMPARISON: DX XR CHEST 2 VIEWS 12/19/2024 2:22 PM FINDINGS: Lungs: Benign granulomatous disease of the lung is noted. Streaky right basilar opacity favors atelectasis. Pleural spaces: Unremarkable. No pleural effusion. No pneumothorax. Heart/Mediastinum: Unremarkable. No cardiomegaly. Bones/joints: Spinal degenerative changes are evident. Osseous structures are unchanged from the prior exam.     Streaky right basilar opacity favors atelectasis. THIS DOCUMENT HAS BEEN ELECTRONICALLY VERIFIED BY FINA VINSON MD ON 01/24/2025 17:01:43    Echo (TTE) limited (PRN contrast/bubble/strain/3D)    Result Date: 1/15/2025    Left Ventricle: Normal left ventricular systolic function with a visually estimated EF of 65 - 70%. Not well visualized. Left ventricle size is normal. Mildly increased wall thickness. Unable to assess wall motion.   Right Ventricle: Not well visualized.   Image quality is adequate. Technically difficult study with poor endocardial visualization.     MRI BRAIN WO CONTRAST    Result Date: 1/14/2025  INDICATION:   h/o cerebellar tumor, change in mentation, family requesting EXAMINATION:  MRI BRAIN WO CONTRAST COMPARISON: November 7, 2024 TECHNIQUE:  Multiplanar multisequence MRI acquisition without contrast of the brain. FINDINGS:  Ventricles:  Midline, no hydrocephalus.  Brain Parenchyma/Brainstem: Mild chronic T2 hyperintensities in the supratentorial white matter and jhon. No acute infarction. Intracranial Hemorrhage:  None. Basal Cisterns: Patent. Extra-axial mass left cerebellopontine angle/retrosigmoid 2.6 x 1.6 cm without significant change. Slight mass effect upon the adjacent cerebellum is unchanged with no underlying edema. Flow Voids:  Normal. Additional Comments:  N/A.     1. Mild chronic microvascular ischemic disease with no acute infarction. 2. No significant change in left cerebellopontine angle/retrosigmoid meningioma. Electronically signed by Edilson Garzon    CT ABDOMEN

## 2025-01-30 NOTE — PROGRESS NOTES
Patient determined to be stable for discharge by attending provider. I have reviewed the discharge instructions and follow-up appointments with the patient and  at bedside . They verbalized understanding and all questions were answered to their satisfaction. No complaints or further questions were expressed.       New medications: Appropriate educational materials and medication side effect teaching were provided.       PIV were removed prior to discharge. DANG Drains (2) removed as well.     All personal items collected during admission were returned to the patient prior to discharge.    Maru Brandon RN

## 2025-01-30 NOTE — CARE COORDINATION
Transition of Care Plan:    RUR: 21%  Prior Level of Functioning:   Disposition: SNF - [facility]  CHLOE:   If SNF or IPR: Date FOC offered:   Date FOC received:   Accepting facility:   Date authorization started with reference number:   Date authorization received and expires:   Follow up appointments:   DME needed: n/a  Transportation at discharge:   IM/IMM Medicare/ letter given: 2nd IM given 1/30  Is patient a [place] and [facility] with [facility]?    If yes, was  transfer form completed and [facility] notified?   Caregiver Contact:   Discharge Caregiver contacted prior to discharge?   Care Conference needed?   Barriers to discharge:     Patient/ are not interested in a swing bed at [facility].  CM informed Rebecca Villar /[facility]  CM is waiting on a return call from [facility], regarding bed availability.  CM will continue to follow.    1:54  [facility] can accept pt today.   will transport to SNF.  Patient & family are in agreement with d/c today.  RN to call report to [phone] ext [phone]    Aster Dasilva  Ext [phone]

## 2025-01-30 NOTE — DISCHARGE SUMMARY
Discharge Summary    Name: Katey Sterling  [id number]  YOB: 1940 (Age: 84 y.o.)   Date of Admission: 1/24/2025  Date of Discharge: 1/30/2025  Attending Physician: Monika Gerard MD    Discharge Diagnosis:     Consultations:  IP CONSULT TO PHARMACY  IP CONSULT TO DIETITIAN  IP CONSULT TO DIABETES MANAGEMENT  IP CONSULT TO PHARMACY  IP CONSULT TO PHARMACY      Brief Admission History/Reason for Admission Per Crystal Anderson MD:   jono Sterling is an 83 yo female with a PMH of recently diagnosed meningioma (Melisi), CAD, IDDM2, HTN, and HLD who presented to the ED at [facility] with perforated gastric ulcer.  She had just been discharged from [facility] on 1/23 after being treated for possible CNS infection (LP negative).     On 1/24, she presented from her SNF as a transfer from [place] with abdominal pain and constipation. CT showed gastric perforation with pneumoperitoneum.  She was transferred to [facility] and taken to the OR for laparoscopic repair of the perforation with Andi patch.  She was transferred to the ICU follow OR and was extubated to NC.  She has been started on zosyn.    Brief Hospital Course by Main Problems:   Perforated gastric ulcer status post Andi patch  -Status post laparoscopic Andi patch and is EGD on 1/24  -General Surgery following.    -UGI-no extravasation  -FULL LIQUID DIET today  Weaned off TPN  Discussed with surgery-okay for discharge  Completed 6 days of Zosyn and fluconazole no further antibiotics needed  Continue PPI indefinitely  Drains out today  Avoid NSAIDs  Full liquid diet for 1 week and then soft diet     Hypokalemia  Hypophosphatemia  Repeat as needed  Repeat BMP, phosphate level in 5 days     Status post extubation postoperatively to nasal cannula  -She is currently on room air     History of recent admission to the hospital for acute encephalopathy, postprocedural fever and also severe sepsis  -Status post completion of  medication strength            CONTINUE taking these medications      atorvastatin 20 MG tablet  Commonly known as: LIPITOR  Take 1 tablet by mouth daily     insulin  UNIT/ML injection vial  Commonly known as: HumuLIN N;NovoLIN N  Inject 14 Units into the skin daily for 3 doses     Insulin Syringe-Needle U-100 30G X 5/16\" 1 ML Misc  Commonly known as: KROGER INSULIN SYR 1CC/30G  1 each by Does not apply route daily     meclizine 25 MG tablet  Commonly known as: ANTIVERT  Take 1 tablet by mouth 3 times daily as needed for Dizziness     metFORMIN 500 MG extended release tablet  Commonly known as: GLUCOPHAGE-XR  TAKE 2 TABLETS BY MOUTH TWICE DAILY FOR DIABETES     metoprolol tartrate 25 MG tablet  Commonly known as: LOPRESSOR  Take 0.5 tablets by mouth 2 times daily     mometasone 0.1 % cream  Commonly known as: ELOCON  Apply topically in thin coat twice daily as needed itching bites.     NIFEdipine 30 MG extended release tablet  Commonly known as: PROCARDIA XL  Take 1 tablet by mouth daily     nitroGLYCERIN 0.4 MG SL tablet  Commonly known as: NITROSTAT  Place 1 tablet under the tongue every 5 minutes as needed for Chest pain up to max of 3 total doses. If no relief after 1 dose, call 911.     Pen Needles 30G X 5 MM Misc  1 each by Does not apply route once a week     Semaglutide(0.25 or 0.5MG/DOS) 2 MG/3ML Sopn  Inject 0.5 mg into the skin once a week Indications: sugar and protect kidney, heart For sugar diabetes and weight            STOP taking these medications      aspirin 81 MG EC tablet     predniSONE 20 MG tablet  Commonly known as: DELTASONE               Where to Get Your Medications        Information about where to get these medications is not yet available    Ask your nurse or doctor about these medications  losartan 25 MG tablet  pantoprazole 40 MG tablet             DISPOSITION:    Home with Family:       Home with HH/PT/OT/RN:    SNF/LTC:    ESAU:    OTHER:            Code status:   Recommended  diet:  Full liquid diet for 1 week and then soft diet  Recommended activity: activity as tolerated  Wound care: None      Follow up with:   PCP : Catalino Gaines MD    Critical access hospital  [address] [address] [phone]  Follow up      Angie Adams MD  [address], [address]  [place] [facility] [address] [phone]    Follow up in 2 week(s)      Catalino Gaines MD  [address] [address]  [phone]    Follow up      Catalino Gaines MD  [address] [address] [phone]                Total time in minutes spent coordinating this discharge (includes going over instructions, follow-up, prescriptions, and preparing report for sign off to her PCP) :  35 minutes

## 2025-01-30 NOTE — PLAN OF CARE
Problem: Chronic Conditions and Co-morbidities  Goal: Patient's chronic conditions and co-morbidity symptoms are monitored and maintained or improved  1/29/2025 1255 by Quentin Smith RN  Outcome: Progressing     Problem: Pain  Goal: Verbalizes/displays adequate comfort level or baseline comfort level  1/29/2025 2331 by CAL Davila RN  Outcome: Progressing  1/29/2025 1255 by Quentin Smith RN  Outcome: Progressing     Problem: Safety - Adult  Goal: Free from fall injury  1/29/2025 2331 by CAL Davila RN  Outcome: Progressing  1/29/2025 1255 by Quentin Smith RN  Outcome: Progressing     Problem: Discharge Planning  Goal: Discharge to home or other facility with appropriate resources  1/29/2025 2331 by CAL Davila RN  Outcome: Progressing  1/29/2025 1255 by Quentin Smith RN  Outcome: Progressing     Problem: Skin/Tissue Integrity  Goal: Skin integrity remains intact  Description: 1.  Monitor for areas of redness and/or skin breakdown  2.  Assess vascular access sites hourly  3.  Every 4-6 hours minimum:  Change oxygen saturation probe site  4.  Every 4-6 hours:  If on nasal continuous positive airway pressure, respiratory therapy assess nares and determine need for appliance change or resting period  1/29/2025 1255 by Quentin Smith RN  Outcome: Progressing  Flowsheets (Taken 1/29/2025 0831)  Skin Integrity Remains Intact: Monitor for areas of redness and/or skin breakdown     Problem: ABCDS Injury Assessment  Goal: Absence of physical injury  1/29/2025 1255 by Quentin Smith RN  Outcome: Progressing     Problem: Occupational Therapy - Adult  Goal: By Discharge: Performs self-care activities at highest level of function for planned discharge setting.  See evaluation for individualized goals.  Description: FUNCTIONAL STATUS PRIOR TO ADMISSION:  Lives with spouse in one level home. Was using a cane prior to admission earlier this month, recommended use of RW upon last

## 2025-01-30 NOTE — PROGRESS NOTES
End of Shift Note    Bedside shift change report given to [facility] (oncoming nurse) by Quentin Smith RN (offgoing nurse).  Report included the following information SBAR, Kardex, Procedure Summary, Intake/Output, and MAR    Shift worked:  7a-7p     Shift summary and any significant changes:     VSS. No complaints of pain. Incisions c/d/I. DANG dressing changed. DANG output documented in flowsheets. 1 BM noted during shift. Tolerating diet with no complaints of N/V. Uneventful     Concerns for physician to address:       Zone phone for oncoming shift:          Activity:  Level of Assistance: Maximum assist, patient does 25-49%  Number times ambulated in hallways past shift: 0  Number of times OOB to chair past shift: 1    Cardiac:   Cardiac Monitoring: Yes      Cardiac Rhythm: Sinus rhythm    Access:  Current line(s): PIV     Genitourinary:   Urinary Status: Incontinent    Respiratory:   O2 Device: None (Room air)  Chronic home O2 use?: CPAP at night  Incentive spirometer at bedside: YES    GI:  Last BM (including prior to admit): 01/29/25  Current diet:  DIET ONE TIME MESSAGE;  ADULT DIET; Full Liquid  Passing flatus: YES    Pain Management:   Patient states pain is manageable on current regimen: YES    Skin:  Carlton Scale Score: 17  Interventions: Wound Offloading (Prevention Methods): Elevate heels, Pillows, Repositioning    Patient Safety:  Fall Risk: Nursing Judgement-Fall Risk High(Add Comments): Yes  Fall Risk Interventions  Nursing Judgement-Fall Risk High(Add Comments): Yes  Toilet Every 2 Hours-In Advance of Need: Yes  Hourly Visual Checks: Awake, In bed  Fall Visual Posted: Socks  Room Door Open: Deferred to decrease stimulation  Alarm On: Bed  Patient Moved Closer to Nursing Station: No    Active Consults:   IP CONSULT TO PHARMACY  IP CONSULT TO DIETITIAN  IP CONSULT TO DIABETES MANAGEMENT  IP CONSULT TO PHARMACY  IP CONSULT TO PHARMACY    Length of Stay:  Expected LOS: 7  Actual LOS: 5    Quentin Smith,

## 2025-01-30 NOTE — PROGRESS NOTES
End of Shift Note    Bedside shift change report given to Venita NAZARIO (oncoming nurse) by CAL Davila RN (offgoing nurse).  Report included the following information SBAR, Intake/Output, and MAR    Shift worked:  7pm-7am     Shift summary and any significant changes:     No complain of pain in this shift. Incision clean/dry/intact.DANG  outpit documented. X1 BM in this shift. Diet well tolerated. Q2 turn completed.     Concerns for physician to address:       Zone phone for oncoming shift:              CAL Davila RN

## 2025-01-31 ENCOUNTER — TELEPHONE (OUTPATIENT)
Age: 85
End: 2025-01-31

## 2025-01-31 NOTE — TELEPHONE ENCOUNTER
Care Transitions Initial Follow Up Call    Outreach made within 2 business days of discharge: Yes    Patient: Katey Sterling Patient : 1940   MRN: [id number]  Reason for Admission: Gastric ulcer   Discharge Date: 25       Spoke with: patients  ... Patient is in [facility]    Discharge department/facility: [facility]    TCM Interactive Patient Contact:  Was patient able to fill all prescriptions: Yes  Was patient instructed to bring all medications to the follow-up visit: Yes  Is patient taking all medications as directed in the discharge summary? Yes  Does patient understand their discharge instructions: Yes  Does patient have questions or concerns that need addressed prior to 7-14 day follow up office visit: no    Additional needs identified to be addressed with provider  No needs identified             Scheduled appointment with PCP within 7-14 days    Follow Up  Future Appointments   Date Time Provider Department Center   2025 10:50 AM Catalino Gaines MD LMCR MAIN [facility] DEP   2025  9:20 AM Madiha Garcia, ANP [facility] BS AMB   2025  3:50 PM Carol Kang, APRN - NP [facility]       [facility]

## 2025-02-02 LAB
EKG ATRIAL RATE: 110 BPM
EKG DIAGNOSIS: NORMAL
EKG P AXIS: 31 DEGREES
EKG P-R INTERVAL: 120 MS
EKG Q-T INTERVAL: 346 MS
EKG QRS DURATION: 86 MS
EKG QTC CALCULATION (BAZETT): 468 MS
EKG R AXIS: 85 DEGREES
EKG T AXIS: -10 DEGREES
EKG VENTRICULAR RATE: 110 BPM

## 2025-02-04 PROBLEM — Z87.828 HISTORY OF KIDNEY INJURY: Status: ACTIVE | Noted: 2025-01-21

## 2025-02-04 PROBLEM — I25.10 ASCVD (ARTERIOSCLEROTIC CARDIOVASCULAR DISEASE): Status: ACTIVE | Noted: 2025-02-04

## 2025-02-04 PROBLEM — B99.9 CNS INFECTION: Status: RESOLVED | Noted: 2025-01-16 | Resolved: 2025-02-04

## 2025-02-04 PROBLEM — R56.9 SEIZURE-LIKE ACTIVITY (HCC): Status: RESOLVED | Noted: 2025-01-15 | Resolved: 2025-02-04

## 2025-02-04 PROBLEM — G93.40 ENCEPHALOPATHY: Status: RESOLVED | Noted: 2025-01-15 | Resolved: 2025-02-04

## 2025-02-04 PROBLEM — K25.5: Status: RESOLVED | Noted: 2025-01-24 | Resolved: 2025-02-04

## 2025-02-04 PROBLEM — D72.829 LEUKOCYTOSIS: Status: RESOLVED | Noted: 2025-01-15 | Resolved: 2025-02-04

## 2025-02-04 PROBLEM — R40.4 UNRESPONSIVE EPISODE: Status: RESOLVED | Noted: 2025-01-15 | Resolved: 2025-02-04

## 2025-02-04 PROBLEM — R50.82 POSTPROCEDURAL FEVER: Status: RESOLVED | Noted: 2025-01-15 | Resolved: 2025-02-04

## 2025-02-04 PROBLEM — D32.9 MENINGIOMA (HCC): Status: ACTIVE | Noted: 2025-02-04

## 2025-02-04 PROBLEM — Z87.11 HISTORY OF GASTRIC ULCER: Status: ACTIVE | Noted: 2025-02-04

## 2025-02-04 PROBLEM — J96.01 ACUTE HYPOXIC RESPIRATORY FAILURE (HCC): Status: RESOLVED | Noted: 2025-01-21 | Resolved: 2025-02-04

## 2025-02-04 PROBLEM — K66.8 PNEUMOPERITONEUM: Status: RESOLVED | Noted: 2025-01-27 | Resolved: 2025-02-04

## 2025-02-04 PROBLEM — E11.65 TYPE 2 DIABETES MELLITUS WITH HYPERGLYCEMIA, WITHOUT LONG-TERM CURRENT USE OF INSULIN (HCC): Status: RESOLVED | Noted: 2025-01-16 | Resolved: 2025-02-04

## 2025-02-04 PROBLEM — G96.89 CNS INFECTION: Status: RESOLVED | Noted: 2025-01-16 | Resolved: 2025-02-04

## 2025-02-10 ENCOUNTER — TELEPHONE (OUTPATIENT)
Age: 85
End: 2025-02-10

## 2025-02-10 NOTE — TELEPHONE ENCOUNTER
[phone] contact # brandan Jason, need to speak with Liana de paz as Katey.  She is back in [facility] [place] and need some guidance as he is hoping that Vel has been staying up on situation and need to speak with him asap.  Pt is now having a lot of headaches and is scheduled to go back to hospital on Wed., 02/12/2025 [facility].  She is on solid food(3 days)  to attempt to build her strength.      Thanks,

## 2025-02-18 ENCOUNTER — OFFICE VISIT (OUTPATIENT)
Age: 85
End: 2025-02-18

## 2025-02-18 VITALS
DIASTOLIC BLOOD PRESSURE: 70 MMHG | HEIGHT: 62 IN | TEMPERATURE: 99 F | OXYGEN SATURATION: 89 % | WEIGHT: 165.3 LBS | RESPIRATION RATE: 12 BRPM | BODY MASS INDEX: 30.42 KG/M2 | HEART RATE: 98 BPM | SYSTOLIC BLOOD PRESSURE: 120 MMHG

## 2025-02-18 DIAGNOSIS — Z48.89 POSTOPERATIVE VISIT: Primary | ICD-10-CM

## 2025-02-18 PROCEDURE — 99024 POSTOP FOLLOW-UP VISIT: CPT | Performed by: SURGERY

## 2025-02-18 ASSESSMENT — PATIENT HEALTH QUESTIONNAIRE - PHQ9
SUM OF ALL RESPONSES TO PHQ QUESTIONS 1-9: 2
1. LITTLE INTEREST OR PLEASURE IN DOING THINGS: SEVERAL DAYS
SUM OF ALL RESPONSES TO PHQ QUESTIONS 1-9: 2
2. FEELING DOWN, DEPRESSED OR HOPELESS: SEVERAL DAYS
SUM OF ALL RESPONSES TO PHQ9 QUESTIONS 1 & 2: 2

## 2025-02-18 NOTE — PROGRESS NOTES
Identified pt with two pt identifiers (name and ). Reviewed chart in preparation for visit and have obtained necessary documentation.    Katey Sterling is a 85 y.o. female Post-Op Check (S/p LAPAROSCOPIC RADHA PATCH AND ESOPHAGOGASTRODUODENOSCOPY on 25)  .    Vitals:    25 1330   BP: 120/70   Site: Left Upper Arm   Position: Sitting   Pulse: 98   Resp: 12   Temp: 99 °F (37.2 °C)   TempSrc: Oral   SpO2: (!) 89%   Weight: 75 kg (165 lb 4.8 oz)   Height: 1.575 m (5' 2.01\")          1. Have you been to the ER, urgent care clinic since your last visit?  Hospitalized since your last visit?  no     2. Have you seen or consulted any other health care providers outside of the [facility] since your last visit?  Include any pap smears or colon screening.  no

## 2025-02-18 NOTE — PROGRESS NOTES
Post-Op Office Visit    SUBJECTIVE: Katey Sterling is a 85 y.o. female is seen for a routine postop check.  Reports no problems with the wound or other issues.  Activity, diet and bowels are normal. No pain.    OBJECTIVE: Appears well. Wounds are well healed without complications or infection.    ASSESSMENT: normal postoperative course, doing well.    PLAN: Patient is instructed to avoid heavy lifting for 2 more weeks. Return PRN for any problems or concerns. Ok to continue solid food. Needs to increase protein.     BRADLEY Arce - NP

## 2025-02-20 NOTE — PROGRESS NOTES
Physician Progress Note      PATIENT:               YESSY ERAZO  [facility] #:                  [id number]  :                       1940  ADMIT DATE:       2025 9:36 PM  DISCH DATE:        2025 5:13 PM  RESPONDING  PROVIDER #:        Monika Gerard MD          QUERY TEXT:    Patient admitted with Perforated peptic ulcer c/b peritonitis. Documentation   reflects Sepsis due to perforated gastric ulcer w peritonitis in H&P .    If possible, please document in the progress notes and discharge summary if   Sepsis was:    ED Provider Notes - Presented for abdominal pain and constipation, CT   scan demonstrated gastric perforation with significant amount of   pneumoperitoneum.    GS Consults - She is peritonitic with early signs of sepsis.  Surgery is   indicated on an emergent basis to reduce risk of sepsis progression.  She was found to have a marked leukocytosis to 29K which is up from her most   recent labs significantly.    Op Notes - Perforated antral ulcer.  Tachycardia.    Discharge Summary - She was transferred to [facility] and taken to the OR for   laparoscopic repair of the perforation with Andi patch.  She was transferred   to the ICU follow OR and was extubated to NC    WBC- - 30.0H, - 27.6, - 19.5, - 12.8H, - 10.7,  - 13.2 - 10.7 - 12.8H - 30.8H.    HR- - 102, 102.  - 124  - 106, 100, 118, 120, 109    Treatment: Piperacillin-tazobactam (ZOSYN) 3,375 mg, iv fluids, Laparoscopic   Andi patch and Esophagogastroduodenoscopy.    Harper Davila  CDS  Options provided:  -- Sepsis due to perforated gastric ulcer w peritonitis POA, confirmed after   study  -- Sepsis due to perforated gastric ulcer w peritonitis developed after   admission.  -- Gastric ulcer with peritonitis without Sepsis.  -- Other - I will add my own diagnosis  -- Disagree - Not applicable / Not valid  -- Disagree - Clinically unable to determine /

## 2025-05-14 ENCOUNTER — OFFICE VISIT (OUTPATIENT)
Age: 85
End: 2025-05-14
Payer: MEDICARE

## 2025-05-14 VITALS
HEART RATE: 74 BPM | BODY MASS INDEX: 28.52 KG/M2 | WEIGHT: 155 LBS | OXYGEN SATURATION: 96 % | DIASTOLIC BLOOD PRESSURE: 67 MMHG | SYSTOLIC BLOOD PRESSURE: 133 MMHG | TEMPERATURE: 98.2 F | RESPIRATION RATE: 18 BRPM | HEIGHT: 62 IN

## 2025-05-14 DIAGNOSIS — Z87.828 HISTORY OF CERVICAL SPINE TRAUMA: Primary | ICD-10-CM

## 2025-05-14 DIAGNOSIS — E83.42 HYPOMAGNESEMIA: ICD-10-CM

## 2025-05-14 DIAGNOSIS — E11.21 TYPE 2 DIABETES MELLITUS WITH DIABETIC NEPHROPATHY, WITHOUT LONG-TERM CURRENT USE OF INSULIN (HCC): ICD-10-CM

## 2025-05-14 DIAGNOSIS — D32.9 MENINGIOMA (HCC): ICD-10-CM

## 2025-05-14 DIAGNOSIS — I25.10 ASCVD (ARTERIOSCLEROTIC CARDIOVASCULAR DISEASE): ICD-10-CM

## 2025-05-14 DIAGNOSIS — E78.2 MIXED HYPERLIPIDEMIA: ICD-10-CM

## 2025-05-14 DIAGNOSIS — I10 PRIMARY HYPERTENSION: ICD-10-CM

## 2025-05-14 DIAGNOSIS — K86.2 PANCREATIC CYST: ICD-10-CM

## 2025-05-14 PROCEDURE — 1090F PRES/ABSN URINE INCON ASSESS: CPT | Performed by: FAMILY MEDICINE

## 2025-05-14 PROCEDURE — 99215 OFFICE O/P EST HI 40 MIN: CPT | Performed by: FAMILY MEDICINE

## 2025-05-14 PROCEDURE — 1123F ACP DISCUSS/DSCN MKR DOCD: CPT | Performed by: FAMILY MEDICINE

## 2025-05-14 PROCEDURE — G8417 CALC BMI ABV UP PARAM F/U: HCPCS | Performed by: FAMILY MEDICINE

## 2025-05-14 PROCEDURE — 3075F SYST BP GE 130 - 139MM HG: CPT | Performed by: FAMILY MEDICINE

## 2025-05-14 PROCEDURE — G8427 DOCREV CUR MEDS BY ELIG CLIN: HCPCS | Performed by: FAMILY MEDICINE

## 2025-05-14 PROCEDURE — 3078F DIAST BP <80 MM HG: CPT | Performed by: FAMILY MEDICINE

## 2025-05-14 PROCEDURE — 1159F MED LIST DOCD IN RCRD: CPT | Performed by: FAMILY MEDICINE

## 2025-05-14 PROCEDURE — G8400 PT W/DXA NO RESULTS DOC: HCPCS | Performed by: FAMILY MEDICINE

## 2025-05-14 PROCEDURE — 3051F HG A1C>EQUAL 7.0%<8.0%: CPT | Performed by: FAMILY MEDICINE

## 2025-05-14 PROCEDURE — 1126F AMNT PAIN NOTED NONE PRSNT: CPT | Performed by: FAMILY MEDICINE

## 2025-05-14 PROCEDURE — 1160F RVW MEDS BY RX/DR IN RCRD: CPT | Performed by: FAMILY MEDICINE

## 2025-05-14 PROCEDURE — 1036F TOBACCO NON-USER: CPT | Performed by: FAMILY MEDICINE

## 2025-05-14 RX ORDER — METOPROLOL SUCCINATE 25 MG/1
25 TABLET, EXTENDED RELEASE ORAL DAILY
Qty: 90 TABLET | Refills: 3 | Status: SHIPPED | OUTPATIENT
Start: 2025-06-01

## 2025-05-14 RX ORDER — METFORMIN HYDROCHLORIDE 500 MG/1
TABLET, EXTENDED RELEASE ORAL
Qty: 180 TABLET | Refills: 3 | Status: SHIPPED | OUTPATIENT
Start: 2025-05-29

## 2025-05-14 RX ORDER — DULAGLUTIDE 0.75 MG/.5ML
INJECTION, SOLUTION SUBCUTANEOUS
COMMUNITY

## 2025-05-14 RX ORDER — FERROUS SULFATE 325(65) MG
1 TABLET ORAL DAILY
COMMUNITY
Start: 2025-05-07

## 2025-05-14 ASSESSMENT — PATIENT HEALTH QUESTIONNAIRE - PHQ9
SUM OF ALL RESPONSES TO PHQ QUESTIONS 1-9: 0
1. LITTLE INTEREST OR PLEASURE IN DOING THINGS: NOT AT ALL
2. FEELING DOWN, DEPRESSED OR HOPELESS: NOT AT ALL

## 2025-05-14 NOTE — PROGRESS NOTES
Katey Sterling is a 85 y.o. female presenting for/with:    Chief Complaint   Patient presents with    Follow up post rehab     Discharged from rehab 5/7/25       Vitals:    05/14/25 1600   BP: 133/67   Pulse: 74   Resp: 18   Temp: 98.2 °F (36.8 °C)   TempSrc: Temporal   SpO2: 96%   Weight: 70.3 kg (155 lb)   Height: 1.575 m (5' 2\")       Pain Scale: 0 - No pain/10  Pain Location:     \"Have you been to the ER, urgent care clinic since your last visit?  Hospitalized since your last visit?\"    YES [facility]      “Have you seen or consulted any other health care providers outside of [facility] since your last visit?”    YES [facility]               5/14/2025     3:48 PM   PHQ-9    Little interest or pleasure in doing things 0   Feeling down, depressed, or hopeless 0   PHQ-2 Score 0   PHQ-9 Total Score 0           8/14/2024     1:30 PM   [facility] AMB LEARNING ASSESSMENT   Primary Learner Patient   Primary Language ENGLISH   Learning Preference DEMONSTRATION   Answered By patient   Relationship to Learner SELF            5/14/2025     3:47 PM   Amb Fall Risk Assessment and TUG Test   Do you feel unsteady or are you worried about falling?  yes   2 or more falls in past year? yes   Fall with injury in past year? yes           5/14/2025     3:00 PM 10/28/2024     2:00 PM 8/14/2024     1:00 PM 2/13/2024     1:00 PM 1/15/2024     1:00 PM 7/10/2023     1:00 PM   ADL ASSESSMENT   Feeding yourself No Help Needed No Help Needed No Help Needed No Help Needed No Help Needed No Help Needed   Getting from bed to chair No Help Needed No Help Needed No Help Needed No Help Needed No Help Needed No Help Needed   Getting dressed No Help Needed No Help Needed No Help Needed No Help Needed No Help Needed No Help Needed   Bathing or showering No Help Needed No Help Needed No Help Needed No Help Needed No Help Needed No Help Needed   Walk across the room (includes cane/walker) Help Needed No Help Needed No Help

## 2025-05-14 NOTE — PROGRESS NOTES
Katey Sterling is a 85 y.o. female  Chief Complaint   Patient presents with    Follow up post rehab     Discharged from rehab 5/7/25     HPI:  C spine fx  Fell while in rehab. Had C2 fx. Saw [facility] spine service. Managed most recently by Dr. Tylor Parker MD. Tx conservatively with aspen collar and activity mod, with good improvement and healing per review of notes in [facility] Everywhere.  Results review:  Imaging:  CT C spine:  Acute fracture through the right pedicle and bilateral C2 laminae (series 4, image 28).      CERVICAL SPINE RADIOGRAPHS 5/1/2025 1:46 PM  HISTORY: Z09: Encounter for follow-up examination after completed treatment   COMPARISON: 4/8/2025 FINDINGS: Lateral views of the cervical spine were obtained during flexion and extension. The bones are diffusely demineralized.    No acute fracture or subluxation is seen. There is mild disc space narrowing, diffusely, most pronounced at T2-C3 and C6-C7. Diffuse facet arthropathy is noted. There is minimal anterolisthesis of C2 on C3, C3 on C4, and C4 on C5, likely on the basis of degenerative change. In retrospect, findings were present on the prior study and are not significantly changed. There is no definite evidence of dynamic instability on these images.     .   Reviewed PMH, PSH, SH, Medications, allergies (see chart).  Current Outpatient Medications   Medication Sig    melatonin 5 MG TABS tablet Take 1 tablet by mouth nightly at bedtime.    FEROSUL 325 (65 Fe) MG tablet Take 1 tablet by mouth daily    losartan (COZAAR) 25 MG tablet TAKE 1 TABLET DAILY FOR PRESSURE    pantoprazole (PROTONIX) 40 MG tablet Take 1 tablet by mouth every morning (before breakfast)    metoprolol tartrate (LOPRESSOR) 25 MG tablet Take 0.5 tablets by mouth 2 times daily    NIFEdipine (PROCARDIA XL) 30 MG extended release tablet Take 1 tablet by mouth daily    Insulin Syringe-Needle U-100 ([employer] INSULIN SYR 1CC/30G) 30G X 5/16\" 1 ML MISC 1 each by Does not apply route

## 2025-05-28 ENCOUNTER — TELEPHONE (OUTPATIENT)
Age: 85
End: 2025-05-28

## 2025-06-03 ENCOUNTER — TELEPHONE (OUTPATIENT)
Age: 85
End: 2025-06-03

## 2025-06-03 DIAGNOSIS — Z87.828 HISTORY OF CERVICAL SPINE TRAUMA: ICD-10-CM

## 2025-06-03 DIAGNOSIS — M54.2 NECK PAIN: Primary | ICD-10-CM

## 2025-06-03 NOTE — TELEPHONE ENCOUNTER
Pt requesting referral to [facility] for eval & treat for neck pain  Pt [facility] 4 months ago  Pt would like to start physical therapy on June 16, 2025

## 2025-06-09 ENCOUNTER — TELEPHONE (OUTPATIENT)
Age: 85
End: 2025-06-09

## 2025-06-09 NOTE — TELEPHONE ENCOUNTER
Attempted to call patient. No answer. Message left requesting call back if questions are still present

## 2025-06-09 NOTE — TELEPHONE ENCOUNTER
----- Message from Princess SANTANA sent at 6/9/2025  1:27 PM EDT -----  Regarding: ECC Message to Provider  ECC Message to Provider    Relationship to Patient: Spouse/Partner Ed Sterling -       Additional Information Ed called in and would like [place] to give him a call   --------------------------------------------------------------------------------------------------------------------------    Call Back Information: OK to leave message on voicemail  Preferred Call Back Number: Phone [phone]

## 2025-06-10 ENCOUNTER — TELEPHONE (OUTPATIENT)
Age: 85
End: 2025-06-10

## 2025-06-13 ENCOUNTER — OFFICE VISIT (OUTPATIENT)
Age: 85
End: 2025-06-13
Payer: MEDICARE

## 2025-06-13 VITALS
TEMPERATURE: 97.3 F | BODY MASS INDEX: 28.34 KG/M2 | OXYGEN SATURATION: 97 % | DIASTOLIC BLOOD PRESSURE: 68 MMHG | HEIGHT: 62 IN | WEIGHT: 154 LBS | HEART RATE: 73 BPM | RESPIRATION RATE: 18 BRPM | SYSTOLIC BLOOD PRESSURE: 125 MMHG

## 2025-06-13 DIAGNOSIS — E78.2 MIXED HYPERLIPIDEMIA: ICD-10-CM

## 2025-06-13 DIAGNOSIS — E11.21 TYPE 2 DIABETES MELLITUS WITH DIABETIC NEPHROPATHY, WITHOUT LONG-TERM CURRENT USE OF INSULIN (HCC): Primary | ICD-10-CM

## 2025-06-13 DIAGNOSIS — H61.23 BILATERAL IMPACTED CERUMEN: ICD-10-CM

## 2025-06-13 DIAGNOSIS — I10 PRIMARY HYPERTENSION: ICD-10-CM

## 2025-06-13 DIAGNOSIS — Z87.11 HISTORY OF GASTRIC ULCER: ICD-10-CM

## 2025-06-13 DIAGNOSIS — Z87.81 HISTORY OF CERVICAL FRACTURE: ICD-10-CM

## 2025-06-13 DIAGNOSIS — I25.10 ASCVD (ARTERIOSCLEROTIC CARDIOVASCULAR DISEASE): ICD-10-CM

## 2025-06-13 DIAGNOSIS — E11.21 TYPE 2 DIABETES MELLITUS WITH DIABETIC NEPHROPATHY, WITHOUT LONG-TERM CURRENT USE OF INSULIN (HCC): ICD-10-CM

## 2025-06-13 DIAGNOSIS — R53.81 PHYSICAL DECONDITIONING: ICD-10-CM

## 2025-06-13 DIAGNOSIS — H90.0 CONDUCTIVE HEARING LOSS, BILATERAL: ICD-10-CM

## 2025-06-13 DIAGNOSIS — Z91.81 AT HIGH RISK FOR FALLS: ICD-10-CM

## 2025-06-13 LAB
CREAT UR-MCNC: 87 MG/DL
MICROALBUMIN UR-MCNC: 3.08 MG/DL
MICROALBUMIN/CREAT UR-RTO: 35 MG/G (ref 0–30)

## 2025-06-13 PROCEDURE — 69210 REMOVE IMPACTED EAR WAX UNI: CPT | Performed by: FAMILY MEDICINE

## 2025-06-13 PROCEDURE — 1036F TOBACCO NON-USER: CPT | Performed by: FAMILY MEDICINE

## 2025-06-13 PROCEDURE — 1123F ACP DISCUSS/DSCN MKR DOCD: CPT | Performed by: FAMILY MEDICINE

## 2025-06-13 PROCEDURE — G8417 CALC BMI ABV UP PARAM F/U: HCPCS | Performed by: FAMILY MEDICINE

## 2025-06-13 PROCEDURE — 99214 OFFICE O/P EST MOD 30 MIN: CPT | Performed by: FAMILY MEDICINE

## 2025-06-13 PROCEDURE — 3078F DIAST BP <80 MM HG: CPT | Performed by: FAMILY MEDICINE

## 2025-06-13 PROCEDURE — 1126F AMNT PAIN NOTED NONE PRSNT: CPT | Performed by: FAMILY MEDICINE

## 2025-06-13 PROCEDURE — G8427 DOCREV CUR MEDS BY ELIG CLIN: HCPCS | Performed by: FAMILY MEDICINE

## 2025-06-13 PROCEDURE — 1159F MED LIST DOCD IN RCRD: CPT | Performed by: FAMILY MEDICINE

## 2025-06-13 PROCEDURE — G8400 PT W/DXA NO RESULTS DOC: HCPCS | Performed by: FAMILY MEDICINE

## 2025-06-13 PROCEDURE — 3074F SYST BP LT 130 MM HG: CPT | Performed by: FAMILY MEDICINE

## 2025-06-13 PROCEDURE — 1090F PRES/ABSN URINE INCON ASSESS: CPT | Performed by: FAMILY MEDICINE

## 2025-06-13 PROCEDURE — 3051F HG A1C>EQUAL 7.0%<8.0%: CPT | Performed by: FAMILY MEDICINE

## 2025-06-13 RX ORDER — BLOOD-GLUCOSE METER
1 KIT MISCELLANEOUS DAILY
Qty: 1 KIT | Refills: 0 | Status: SHIPPED | OUTPATIENT
Start: 2025-06-13

## 2025-06-13 RX ORDER — METFORMIN HYDROCHLORIDE 500 MG/1
500 TABLET, EXTENDED RELEASE ORAL
Qty: 90 TABLET | Refills: 3 | Status: SHIPPED | OUTPATIENT
Start: 2025-06-13

## 2025-06-13 RX ORDER — PANTOPRAZOLE SODIUM 40 MG/1
40 TABLET, DELAYED RELEASE ORAL
Qty: 90 TABLET | Refills: 3 | Status: SHIPPED | OUTPATIENT
Start: 2025-06-13

## 2025-06-13 RX ORDER — ATORVASTATIN CALCIUM 20 MG/1
20 TABLET, FILM COATED ORAL DAILY
Qty: 90 TABLET | Refills: 3 | Status: SHIPPED | OUTPATIENT
Start: 2025-06-13

## 2025-06-13 ASSESSMENT — PATIENT HEALTH QUESTIONNAIRE - PHQ9
1. LITTLE INTEREST OR PLEASURE IN DOING THINGS: NOT AT ALL
SUM OF ALL RESPONSES TO PHQ QUESTIONS 1-9: 0
SUM OF ALL RESPONSES TO PHQ QUESTIONS 1-9: 0
2. FEELING DOWN, DEPRESSED OR HOPELESS: NOT AT ALL
SUM OF ALL RESPONSES TO PHQ QUESTIONS 1-9: 0
SUM OF ALL RESPONSES TO PHQ QUESTIONS 1-9: 0

## 2025-06-13 NOTE — PROGRESS NOTES
Katey Sterling is a 85 y.o. female presenting for/with:    Chief Complaint   Patient presents with    Hypertension    Diabetes    Cerumen Impaction    Referral - General     Needs referral for [facility]       Vitals:    06/13/25 1300   BP: 125/68   Pulse: 73   Resp: 18   Temp: 97.3 °F (36.3 °C)   TempSrc: Temporal   SpO2: 97%   Weight: 69.9 kg (154 lb)   Height: 1.575 m (5' 2\")       Pain Scale: 0 - No pain/10  Pain Location:     \"Have you been to the ER, urgent care clinic since your last visit?  Hospitalized since your last visit?\"    NO    “Have you seen or consulted any other health care providers outside of [facility] since your last visit?”    NO                 6/13/2025     1:52 PM   PHQ-9    Little interest or pleasure in doing things 0   Feeling down, depressed, or hopeless 0   PHQ-2 Score 0   PHQ-9 Total Score 0           8/14/2024     1:30 PM   [facility] AMB LEARNING ASSESSMENT   Primary Learner Patient   Primary Language ENGLISH   Learning Preference DEMONSTRATION   Answered By patient   Relationship to Learner SELF            6/13/2025     1:51 PM   Amb Fall Risk Assessment and TUG Test   Do you feel unsteady or are you worried about falling?  yes   2 or more falls in past year? no   Fall with injury in past year? no           6/13/2025     1:00 PM 5/14/2025     3:00 PM 10/28/2024     2:00 PM 8/14/2024     1:00 PM 2/13/2024     1:00 PM 1/15/2024     1:00 PM 7/10/2023     1:00 PM   ADL ASSESSMENT   Feeding yourself No Help Needed No Help Needed No Help Needed No Help Needed No Help Needed No Help Needed No Help Needed   Getting from bed to chair No Help Needed No Help Needed No Help Needed No Help Needed No Help Needed No Help Needed No Help Needed   Getting dressed No Help Needed No Help Needed No Help Needed No Help Needed No Help Needed No Help Needed No Help Needed   Bathing or showering No Help Needed No Help Needed No Help Needed No Help Needed No Help Needed No Help Needed No Help

## 2025-06-13 NOTE — PROGRESS NOTES
Katey Sterling is a 85 y.o. female  Chief Complaint   Patient presents with    Hypertension    Diabetes    Cerumen Impaction    Referral - General     Needs referral for [facility]     HPI:  C spine fx  Fell while in rehab. Had C2 fx. Saw [facility] spine service. Managed most recently by Dr. Tylor Parker MD. Tx conservatively with aspen collar and activity mod, with good improvement and healing per review of notes in [facility] Everywhere.  Results review:  Imaging:  CT C spine:  Acute fracture through the right pedicle and bilateral C2 laminae (series 4, image 28).      CERVICAL SPINE RADIOGRAPHS 5/1/2025 1:46 PM  HISTORY: Z09: Encounter for follow-up examination after completed treatment   COMPARISON: 4/8/2025 FINDINGS: Lateral views of the cervical spine were obtained during flexion and extension. The bones are diffusely demineralized.    No acute fracture or subluxation is seen. There is mild disc space narrowing, diffusely, most pronounced at T2-C3 and C6-C7. Diffuse facet arthropathy is noted. There is minimal anterolisthesis of C2 on C3, C3 on C4, and C4 on C5, likely on the basis of degenerative change. In retrospect, findings were present on the prior study and are not significantly changed. There is no definite evidence of dynamic instability on these images.     .   Reviewed PMH, PSH, SH, Medications, allergies (see chart).  Current Outpatient Medications   Medication Sig    melatonin 5 MG TABS tablet Take 1 tablet by mouth nightly at bedtime.    FEROSUL 325 (65 Fe) MG tablet Take 1 tablet by mouth daily    metFORMIN (GLUCOPHAGE-XR) 500 MG extended release tablet TAKE 2 TABLET BY MOUTH DAILY FOR DIABETES    metoprolol succinate (TOPROL XL) 25 MG extended release tablet Take 1 tablet by mouth daily Indications: heart and pressure    losartan (COZAAR) 25 MG tablet TAKE 1 TABLET DAILY FOR PRESSURE    pantoprazole (PROTONIX) 40 MG tablet Take 1 tablet by mouth every morning (before breakfast)    NIFEdipine

## 2025-06-14 LAB
ALBUMIN SERPL-MCNC: 3.6 G/DL (ref 3.5–5)
ALBUMIN/GLOB SERPL: 1.1 (ref 1.1–2.2)
ALP SERPL-CCNC: 119 U/L (ref 45–117)
ALT SERPL-CCNC: 19 U/L (ref 12–78)
ANION GAP SERPL CALC-SCNC: 5 MMOL/L (ref 2–12)
AST SERPL-CCNC: <3 U/L (ref 15–37)
BILIRUB SERPL-MCNC: 0.2 MG/DL (ref 0.2–1)
BUN SERPL-MCNC: 22 MG/DL (ref 6–20)
BUN/CREAT SERPL: 23 (ref 12–20)
CALCIUM SERPL-MCNC: 10.5 MG/DL (ref 8.5–10.1)
CHLORIDE SERPL-SCNC: 106 MMOL/L (ref 97–108)
CHOLEST SERPL-MCNC: 173 MG/DL
CO2 SERPL-SCNC: 29 MMOL/L (ref 21–32)
CREAT SERPL-MCNC: 0.94 MG/DL (ref 0.55–1.02)
EST. AVERAGE GLUCOSE BLD GHB EST-MCNC: 148 MG/DL
GLOBULIN SER CALC-MCNC: 3.2 G/DL (ref 2–4)
GLUCOSE SERPL-MCNC: 126 MG/DL (ref 65–100)
HBA1C MFR BLD: 6.8 % (ref 4–5.6)
HDLC SERPL-MCNC: 69 MG/DL
HDLC SERPL: 2.5 (ref 0–5)
LDLC SERPL CALC-MCNC: 44.6 MG/DL (ref 0–100)
POTASSIUM SERPL-SCNC: 4.8 MMOL/L (ref 3.5–5.1)
PROT SERPL-MCNC: 6.8 G/DL (ref 6.4–8.2)
SODIUM SERPL-SCNC: 140 MMOL/L (ref 136–145)
TRIGL SERPL-MCNC: 297 MG/DL
VLDLC SERPL CALC-MCNC: 59.4 MG/DL

## 2025-06-17 ENCOUNTER — RESULTS FOLLOW-UP (OUTPATIENT)
Age: 85
End: 2025-06-17

## 2025-08-15 ENCOUNTER — TELEPHONE (OUTPATIENT)
Age: 85
End: 2025-08-15

## 2025-08-15 DIAGNOSIS — I25.10 ASCVD (ARTERIOSCLEROTIC CARDIOVASCULAR DISEASE): ICD-10-CM

## 2025-08-15 RX ORDER — METOPROLOL SUCCINATE 25 MG/1
25 TABLET, EXTENDED RELEASE ORAL DAILY
Qty: 90 TABLET | Refills: 3 | Status: SHIPPED | OUTPATIENT
Start: 2025-08-15

## (undated) PROCEDURE — 0DQ64ZZ REPAIR STOMACH, PERCUTANEOUS ENDOSCOPIC APPROACH: ICD-10-PCS

## (undated) DEVICE — CO2 INSUFFLATION NEEDLE: Brand: CORE DYNAMICS

## (undated) DEVICE — TROCAR: Brand: KII FIOS FIRST ENTRY

## (undated) DEVICE — DEVON™ KNEE AND BODY STRAP 60" X 3" (1.5 M X 7.6 CM): Brand: DEVON

## (undated) DEVICE — KENDALL SCD EXPRESS SLEEVES, KNEE LENGTH, MEDIUM: Brand: KENDALL SCD

## (undated) DEVICE — SUTURE VICRYL + SZ 3-0 L27IN ABSRB UD L26MM SH 1/2 CIR VCP416H

## (undated) DEVICE — GUIDEWIRE VASC L260CM 0.035IN J TIP L3MM PTFE FIX COR NAMIC

## (undated) DEVICE — Device

## (undated) DEVICE — FRAZIER SUCTION INSTRUMENT 7 FR W/CONTROL VENT & OBTURATOR: Brand: FRAZIER

## (undated) DEVICE — SUTURE V-LOC 90 3-0 L9IN ABSRB VLT L26MM V-20 1/2 CIR TAPR VLOCM0644

## (undated) DEVICE — SUTURE VICRYL + SZ 4-0 L27IN ABSRB UD PS-2 3/8 CIR REV CUT VCP426H

## (undated) DEVICE — GAUZE SPONGES,12 PLY: Brand: CURITY

## (undated) DEVICE — CATHETER DIAG 5FR L100CM LUMN ID0.047IN JL3.5 CRV 0 SIDE H

## (undated) DEVICE — CATHETER DIAG 5FR L100CM LUMN ID0.047IN JR4 CRV 0 SIDE H

## (undated) DEVICE — GLOVE SURG SZ 7 L12IN FNGR THK79MIL GRN LTX FREE

## (undated) DEVICE — KIT ACCS INTRO 4FR L10CM NDL 21GA L7CM GWIRE L40CM

## (undated) DEVICE — SUTURE PERMAHAND SZ 4-0 L18IN NONABSORBABLE BLK L19MM PS-2 1677G

## (undated) DEVICE — SYRINGE MED 10ML LUERLOCK TIP W/O SFTY DISP

## (undated) DEVICE — SPLINT WR VELC FOAM NEUT POS DISP FOR RAD ART ACC SFT STRP

## (undated) DEVICE — HYPODERMIC SAFETY NEEDLE: Brand: MAGELLAN

## (undated) DEVICE — TR BAND RADIAL ARTERY COMPRESSION DEVICE: Brand: TR BAND

## (undated) DEVICE — KIT INFECTION CTRL ST FRAN --

## (undated) DEVICE — CATHETER COR DIAG 4.0 5FR 100CM 0 SIDE H

## (undated) DEVICE — HI-TORQUE VERSACORE FLOPPY GUIDE WIRE SYSTEM 145 CM: Brand: HI-TORQUE VERSACORE

## (undated) DEVICE — MEDI-VAC NON-CONDUCTIVE SUCTION TUBING: Brand: CARDINAL HEALTH

## (undated) DEVICE — SUT CHRMC 4-0 18IN PS2 BRN --

## (undated) DEVICE — GOWN,SIRUS,NONRNF,SETINSLV,XL,20/CS: Brand: MEDLINE

## (undated) DEVICE — BLADE CLIPPER GEN PURP NS

## (undated) DEVICE — COTTON BALLS: Brand: DEROYAL

## (undated) DEVICE — 3M™ TEGADERM™ TRANSPARENT FILM DRESSING FRAME STYLE, 1624W, 2-3/8 IN X 2-3/4 IN (6 CM X 7 CM), 100/CT 4CT/CASE: Brand: 3M™ TEGADERM™

## (undated) DEVICE — GLIDESHEATH SLENDER ACCESS KIT: Brand: GLIDESHEATH SLENDER

## (undated) DEVICE — DRAIN SURG 19FR 0.25IN SIL RND W/ TRCR INDIC DOT RADPQ FULL

## (undated) DEVICE — TOWEL SURG W17XL27IN STD BLU COT NONFENESTRATED PREWASHED

## (undated) DEVICE — PACK,EENT,TURBAN DRAPE,PK II: Brand: MEDLINE

## (undated) DEVICE — SPONGE GZ W4XL4IN COT 12 PLY TYP VII WVN C FLD DSGN STERILE

## (undated) DEVICE — HANDLE LT SNAP ON ULT DURABLE LENS FOR TRUMPF ALC DISPOSABLE

## (undated) DEVICE — PAD PT POS 36 IN SURGYPAD DISP

## (undated) DEVICE — NEEDLE HYPO 18GA L1.5IN PNK S STL HUB POLYPR SHLD REG BVL

## (undated) DEVICE — KIT,1200CC CANISTER,3/16"X6' TUBING: Brand: MEDLINE INDUSTRIES, INC.

## (undated) DEVICE — DRAPE FLD WRM W44XL66IN C6L FOR INTRATEMP SYS THERMABASIN

## (undated) DEVICE — STRIP,CLOSURE,WOUND,MEDI-STRIP,1/2X4: Brand: MEDLINE

## (undated) DEVICE — HEART CATH-MRMC: Brand: MEDLINE INDUSTRIES, INC.

## (undated) DEVICE — SYRINGE ANGIO 10 CC BRL STD PRNT POLYCARB LT BLU MEDALLION

## (undated) DEVICE — SYR IRR BLB 2OZ DISP BLU STRL -- CONVERT TO ITEM 357637

## (undated) DEVICE — TUBING PRSS MON L6IN PVC M FEM CONN

## (undated) DEVICE — 4-PORT MANIFOLD: Brand: NEPTUNE 2

## (undated) DEVICE — TROCAR: Brand: KII SLEEVE

## (undated) DEVICE — SUTURE MONOCRYL SZ 4-0 L27IN ABSRB UD L19MM PS-2 1/2 CIR PRIM Y426H

## (undated) DEVICE — 3M™ IOBAN™ 2 ANTIMICROBIAL INCISE DRAPE 6650EZ: Brand: IOBAN™ 2

## (undated) DEVICE — GLOVE SURG SZ 7 CRM LTX FREE POLYISOPRENE POLYMER BEAD ANTI

## (undated) DEVICE — PROVE COVER: Brand: UNBRANDED

## (undated) DEVICE — 1LYRTR 16FR10ML100%SIL UMS SNP: Brand: MEDLINE INDUSTRIES, INC.

## (undated) DEVICE — SOLUTION IRRIG 1000ML H2O STRL BLT

## (undated) DEVICE — (D)SYR 10ML 1/5ML GRAD NSAF -- PKGING CHANGE USE ITEM 338027

## (undated) DEVICE — STERILE POLYISOPRENE POWDER-FREE SURGICAL GLOVES: Brand: PROTEXIS

## (undated) DEVICE — 3M™ TEGADERM™ TRANSPARENT FILM DRESSING FRAME STYLE, 1626W, 4 IN X 4-3/4 IN (10 CM X 12 CM), 50/CT 4CT/CASE: Brand: 3M™ TEGADERM™

## (undated) DEVICE — SOLUTION IV 1000ML 0.9% SOD CHL

## (undated) DEVICE — GENERAL LAPAROSCOPY-MRMC: Brand: MEDLINE INDUSTRIES, INC.

## (undated) DEVICE — TUBING INSUF FLTR STD FLO DEHP FREE

## (undated) DEVICE — APPLICATOR FBR TIP L6IN COT TIP WOOD SHFT SWAB 2000 PER CA

## (undated) DEVICE — SUTURE NONABSORBABLE MONOFILAMENT 2-0 FS 18 IN ETHILON 664H